# Patient Record
Sex: FEMALE | Race: WHITE | NOT HISPANIC OR LATINO
[De-identification: names, ages, dates, MRNs, and addresses within clinical notes are randomized per-mention and may not be internally consistent; named-entity substitution may affect disease eponyms.]

---

## 2020-11-30 ENCOUNTER — TRANSCRIPTION ENCOUNTER (OUTPATIENT)
Age: 50
End: 2020-11-30

## 2020-12-31 ENCOUNTER — TRANSCRIPTION ENCOUNTER (OUTPATIENT)
Age: 50
End: 2020-12-31

## 2021-08-30 ENCOUNTER — TRANSCRIPTION ENCOUNTER (OUTPATIENT)
Age: 51
End: 2021-08-30

## 2021-10-04 ENCOUNTER — TRANSCRIPTION ENCOUNTER (OUTPATIENT)
Age: 51
End: 2021-10-04

## 2021-11-30 ENCOUNTER — TRANSCRIPTION ENCOUNTER (OUTPATIENT)
Age: 51
End: 2021-11-30

## 2022-10-19 ENCOUNTER — TRANSCRIPTION ENCOUNTER (OUTPATIENT)
Age: 52
End: 2022-10-19

## 2022-10-19 ENCOUNTER — APPOINTMENT (OUTPATIENT)
Dept: SURGERY | Facility: CLINIC | Age: 52
End: 2022-10-19

## 2022-10-19 PROCEDURE — 99205K: CUSTOM

## 2022-10-20 ENCOUNTER — APPOINTMENT (OUTPATIENT)
Dept: MRI IMAGING | Facility: CLINIC | Age: 52
End: 2022-10-20

## 2022-10-20 ENCOUNTER — OUTPATIENT (OUTPATIENT)
Dept: OUTPATIENT SERVICES | Facility: HOSPITAL | Age: 52
LOS: 1 days | End: 2022-10-20
Payer: COMMERCIAL

## 2022-10-20 DIAGNOSIS — Z00.8 ENCOUNTER FOR OTHER GENERAL EXAMINATION: ICD-10-CM

## 2022-10-20 PROCEDURE — C8937: CPT

## 2022-10-20 PROCEDURE — A9585: CPT

## 2022-10-20 PROCEDURE — C8908: CPT

## 2022-10-20 PROCEDURE — 77049 MRI BREAST C-+ W/CAD BI: CPT | Mod: 26

## 2022-10-25 ENCOUNTER — OUTPATIENT (OUTPATIENT)
Dept: OUTPATIENT SERVICES | Facility: HOSPITAL | Age: 52
LOS: 1 days | Discharge: HOME | End: 2022-10-25

## 2022-10-25 DIAGNOSIS — C50.919 MALIGNANT NEOPLASM OF UNSPECIFIED SITE OF UNSPECIFIED FEMALE BREAST: ICD-10-CM

## 2022-10-26 ENCOUNTER — APPOINTMENT (OUTPATIENT)
Dept: NUCLEAR MEDICINE | Facility: CLINIC | Age: 52
End: 2022-10-26

## 2022-10-26 ENCOUNTER — APPOINTMENT (OUTPATIENT)
Dept: CT IMAGING | Facility: CLINIC | Age: 52
End: 2022-10-26

## 2022-10-26 ENCOUNTER — OUTPATIENT (OUTPATIENT)
Dept: OUTPATIENT SERVICES | Facility: HOSPITAL | Age: 52
LOS: 1 days | End: 2022-10-26

## 2022-10-26 DIAGNOSIS — Z00.8 ENCOUNTER FOR OTHER GENERAL EXAMINATION: ICD-10-CM

## 2022-10-26 PROCEDURE — 78306 BONE IMAGING WHOLE BODY: CPT | Mod: 26

## 2022-10-26 PROCEDURE — 71260 CT THORAX DX C+: CPT | Mod: 26

## 2022-10-26 PROCEDURE — 74177 CT ABD & PELVIS W/CONTRAST: CPT | Mod: 26

## 2022-11-01 ENCOUNTER — RESULT REVIEW (OUTPATIENT)
Age: 52
End: 2022-11-01

## 2022-11-01 ENCOUNTER — APPOINTMENT (OUTPATIENT)
Dept: HEMATOLOGY ONCOLOGY | Facility: CLINIC | Age: 52
End: 2022-11-01

## 2022-11-01 ENCOUNTER — NON-APPOINTMENT (OUTPATIENT)
Age: 52
End: 2022-11-01

## 2022-11-01 VITALS
TEMPERATURE: 97 F | RESPIRATION RATE: 16 BRPM | DIASTOLIC BLOOD PRESSURE: 97 MMHG | WEIGHT: 183.65 LBS | BODY MASS INDEX: 26.59 KG/M2 | HEART RATE: 94 BPM | OXYGEN SATURATION: 98 % | HEIGHT: 69.65 IN | SYSTOLIC BLOOD PRESSURE: 154 MMHG

## 2022-11-01 LAB
BASOPHILS # BLD AUTO: 0.05 K/UL — SIGNIFICANT CHANGE UP (ref 0–0.2)
BASOPHILS NFR BLD AUTO: 0.6 % — SIGNIFICANT CHANGE UP (ref 0–2)
EOSINOPHIL # BLD AUTO: 0.03 K/UL — SIGNIFICANT CHANGE UP (ref 0–0.5)
EOSINOPHIL NFR BLD AUTO: 0.4 % — SIGNIFICANT CHANGE UP (ref 0–6)
HCT VFR BLD CALC: 40.1 % — SIGNIFICANT CHANGE UP (ref 34.5–45)
HGB BLD-MCNC: 13.7 G/DL — SIGNIFICANT CHANGE UP (ref 11.5–15.5)
IMM GRANULOCYTES NFR BLD AUTO: 0.3 % — SIGNIFICANT CHANGE UP (ref 0–0.9)
LYMPHOCYTES # BLD AUTO: 1.55 K/UL — SIGNIFICANT CHANGE UP (ref 1–3.3)
LYMPHOCYTES # BLD AUTO: 19.6 % — SIGNIFICANT CHANGE UP (ref 13–44)
MCHC RBC-ENTMCNC: 30.2 PG — SIGNIFICANT CHANGE UP (ref 27–34)
MCHC RBC-ENTMCNC: 34.2 G/DL — SIGNIFICANT CHANGE UP (ref 32–36)
MCV RBC AUTO: 88.5 FL — SIGNIFICANT CHANGE UP (ref 80–100)
MONOCYTES # BLD AUTO: 0.66 K/UL — SIGNIFICANT CHANGE UP (ref 0–0.9)
MONOCYTES NFR BLD AUTO: 8.4 % — SIGNIFICANT CHANGE UP (ref 2–14)
NEUTROPHILS # BLD AUTO: 5.59 K/UL — SIGNIFICANT CHANGE UP (ref 1.8–7.4)
NEUTROPHILS NFR BLD AUTO: 70.7 % — SIGNIFICANT CHANGE UP (ref 43–77)
NRBC # BLD: 0 /100 WBCS — SIGNIFICANT CHANGE UP (ref 0–0)
PLATELET # BLD AUTO: 280 K/UL — SIGNIFICANT CHANGE UP (ref 150–400)
RBC # BLD: 4.53 M/UL — SIGNIFICANT CHANGE UP (ref 3.8–5.2)
RBC # FLD: 12.5 % — SIGNIFICANT CHANGE UP (ref 10.3–14.5)
WBC # BLD: 7.9 K/UL — SIGNIFICANT CHANGE UP (ref 3.8–10.5)
WBC # FLD AUTO: 7.9 K/UL — SIGNIFICANT CHANGE UP (ref 3.8–10.5)

## 2022-11-01 PROCEDURE — 99245 OFF/OP CONSLTJ NEW/EST HI 55: CPT

## 2022-11-02 LAB
ALBUMIN SERPL ELPH-MCNC: 5 G/DL
ALP BLD-CCNC: 86 U/L
ALT SERPL-CCNC: 15 U/L
ANION GAP SERPL CALC-SCNC: 15 MMOL/L
AST SERPL-CCNC: 18 U/L
BILIRUB SERPL-MCNC: 0.3 MG/DL
BUN SERPL-MCNC: 14 MG/DL
CALCIUM SERPL-MCNC: 9.9 MG/DL
CHLORIDE SERPL-SCNC: 102 MMOL/L
CO2 SERPL-SCNC: 26 MMOL/L
CREAT SERPL-MCNC: 0.73 MG/DL
EGFR: 98 ML/MIN/1.73M2
GLUCOSE SERPL-MCNC: 107 MG/DL
HBV SURFACE AB SER QL: NONREACTIVE
HBV SURFACE AG SER QL: NONREACTIVE
HCV AB SER QL: NONREACTIVE
HCV S/CO RATIO: 0.16 S/CO
INR PPP: 1.03 RATIO
POTASSIUM SERPL-SCNC: 4.2 MMOL/L
PROT SERPL-MCNC: 7.8 G/DL
PT BLD: 12.2 SEC
SODIUM SERPL-SCNC: 142 MMOL/L

## 2022-11-02 NOTE — HISTORY OF PRESENT ILLNESS
[Disease: _____________________] : Disease: [unfilled] [T: ___] : T[unfilled] [N: ___] : N[unfilled] [M: ___] : M[unfilled] [de-identified] : Please see dictated initial consult note (*d consult) in AEHR [de-identified] : ER+ GA+ Her 2 michelle +

## 2022-11-03 ENCOUNTER — APPOINTMENT (OUTPATIENT)
Dept: ULTRASOUND IMAGING | Facility: IMAGING CENTER | Age: 52
End: 2022-11-03

## 2022-11-03 ENCOUNTER — OUTPATIENT (OUTPATIENT)
Dept: OUTPATIENT SERVICES | Facility: HOSPITAL | Age: 52
LOS: 1 days | End: 2022-11-03
Payer: COMMERCIAL

## 2022-11-03 DIAGNOSIS — Z00.8 ENCOUNTER FOR OTHER GENERAL EXAMINATION: ICD-10-CM

## 2022-11-03 PROCEDURE — 19083 BX BREAST 1ST LESION US IMAG: CPT

## 2022-11-03 PROCEDURE — 76642 ULTRASOUND BREAST LIMITED: CPT | Mod: 26,LT

## 2022-11-03 PROCEDURE — 76642 ULTRASOUND BREAST LIMITED: CPT

## 2022-11-04 ENCOUNTER — RESULT REVIEW (OUTPATIENT)
Age: 52
End: 2022-11-04

## 2022-11-08 ENCOUNTER — FORM ENCOUNTER (OUTPATIENT)
Age: 52
End: 2022-11-08

## 2022-11-11 ENCOUNTER — RESULT REVIEW (OUTPATIENT)
Age: 52
End: 2022-11-11

## 2022-11-11 ENCOUNTER — TRANSCRIPTION ENCOUNTER (OUTPATIENT)
Age: 52
End: 2022-11-11

## 2022-11-11 ENCOUNTER — OUTPATIENT (OUTPATIENT)
Dept: OUTPATIENT SERVICES | Facility: HOSPITAL | Age: 52
LOS: 1 days | End: 2022-11-11
Payer: COMMERCIAL

## 2022-11-11 VITALS
HEART RATE: 81 BPM | SYSTOLIC BLOOD PRESSURE: 108 MMHG | DIASTOLIC BLOOD PRESSURE: 70 MMHG | RESPIRATION RATE: 16 BRPM | OXYGEN SATURATION: 99 %

## 2022-11-11 VITALS
WEIGHT: 179.9 LBS | HEART RATE: 77 BPM | HEIGHT: 70 IN | OXYGEN SATURATION: 96 % | SYSTOLIC BLOOD PRESSURE: 159 MMHG | RESPIRATION RATE: 18 BRPM | DIASTOLIC BLOOD PRESSURE: 106 MMHG | TEMPERATURE: 98 F

## 2022-11-11 DIAGNOSIS — C50.912 MALIGNANT NEOPLASM OF UNSPECIFIED SITE OF LEFT FEMALE BREAST: ICD-10-CM

## 2022-11-11 PROCEDURE — C1769: CPT

## 2022-11-11 PROCEDURE — 76937 US GUIDE VASCULAR ACCESS: CPT

## 2022-11-11 PROCEDURE — C1894: CPT

## 2022-11-11 PROCEDURE — 36561 INSERT TUNNELED CV CATH: CPT

## 2022-11-11 PROCEDURE — 77001 FLUOROGUIDE FOR VEIN DEVICE: CPT

## 2022-11-11 PROCEDURE — C1788: CPT

## 2022-11-11 PROCEDURE — 76937 US GUIDE VASCULAR ACCESS: CPT | Mod: 26

## 2022-11-11 PROCEDURE — 77001 FLUOROGUIDE FOR VEIN DEVICE: CPT | Mod: 26

## 2022-11-11 RX ORDER — ONDANSETRON 8 MG/1
4 TABLET, FILM COATED ORAL ONCE
Refills: 0 | Status: DISCONTINUED | OUTPATIENT
Start: 2022-11-11 | End: 2022-11-25

## 2022-11-11 RX ORDER — FENTANYL CITRATE 50 UG/ML
50 INJECTION INTRAVENOUS ONCE
Refills: 0 | Status: DISCONTINUED | OUTPATIENT
Start: 2022-11-11 | End: 2022-11-11

## 2022-11-11 NOTE — PRE PROCEDURE NOTE - PRE PROCEDURE EVALUATION
Interventional Radiology    HPI: 52y Female with newly diagnosed breast cancer, here for a port placement.    Allergies: Augmentin (Hives)  clindamycin (Hives)  doxycycline (Hives)  Keflex (Hives)  Sulfac 10% (Hives)    Medications (Abx/Cardiac/Anticoagulation/Blood Products)      Data:    T(C): 36.6  HR: 77  BP: 159/106  RR: 18  SpO2: 96%    Exam  General: No acute distress  Chest: Non labored breathing  Abdomen: Non-distended  Extremities: No swelling, warm    Plan: 52y Female with newly diagnosed breast cancer, here for a port placement.  -Risks/Benefits/alternatives explained with the patient and/or healthcare proxy and witnessed informed consent obtained.

## 2022-11-11 NOTE — ASU DISCHARGE PLAN (ADULT/PEDIATRIC) - NURSING INSTRUCTIONS
Please feel free to contact us at [phone] if any problems arise. After 6PM, Monday through Friday, on weekends and on holidays, please call [phone] and ask for the radiology resident on call to be paged.

## 2022-11-11 NOTE — ASU DISCHARGE PLAN (ADULT/PEDIATRIC) - NS MD DC FALL RISK RISK
For information on Fall & Injury Prevention, visit: [email or web address] OR  [email or web address] OR  https://www.cdc.gov/steadi/patient.html

## 2022-11-11 NOTE — PRE-ANESTHESIA EVALUATION ADULT - NSANTHOSAYNRD_GEN_A_CORE
No. PRIMO screening performed.  STOP BANG Legend: 0-2 = LOW Risk; 3-4 = INTERMEDIATE Risk; 5-8 = HIGH Risk
No. PRIMO screening performed.  STOP BANG Legend: 0-2 = LOW Risk; 3-4 = INTERMEDIATE Risk; 5-8 = HIGH Risk

## 2022-11-11 NOTE — ASU PATIENT PROFILE, ADULT - FALL HARM RISK - UNIVERSAL INTERVENTIONS
Bed in lowest position, wheels locked, appropriate side rails in place/Call bell, personal items and telephone in reach/Instruct patient to call for assistance before getting out of bed or chair/Non-slip footwear when patient is out of bed/[place] to call system/Physically safe environment - no spills, clutter or unnecessary equipment/Purposeful Proactive Rounding/Room/bathroom lighting operational, light cord in reach

## 2022-11-11 NOTE — ASU DISCHARGE PLAN (ADULT/PEDIATRIC) - ASU DC SPECIAL INSTRUCTIONSFT
Chest Port Placement    Discharge Instructions  - You have had a chest port implanted in your chest.   - The port is ready for use.  - You may shower in 48 hours. No soaking or swimming for 2 weeks or until the site is completely healed.  - Keep the area covered and dry for the next 7 days. It may be removed by a chemotherapy nurse as needed for treatment.  - Do not perform any heavy lifting or put tension on the area for the next week or until the site is healed.  - You may resume your normal diet.  - You may resume your normal medications however you should wait 48 hours before restarting aspirin, plavix, or blood thinners.  - It is normal to experience some pain over the site for the next few days. You may take apply ice to the area (20 minutes on, 20 minutes off) and take Tylenol for that pain. Do not take more frequently than every 6 hours and do not exceed more than 3000mg of Tylenol in a 24 hour period.    - You were given conscious sedation which may make you drowsy, therefore you need someone to stay with you until the morning following the procedure.  - Do not drive, engage in heavy lifting or strenuous activity, or drink any alcoholic beverages for the next 24 hours.   - You may resume normal activity in 24 hours.    Notify your primary physician and/or Interventional Radiology IMMEDIATELY if you experience any of the following       - Fever of 101F or 38C       - Chills or Rigors/ Shakes       - Swelling and/or Redness in the area around the port       - Worsening Pain       - Blood soaked bandages or worsening bleeding       - Lightheadedness and/or dizziness upon standing       - Chest Pain/ Tightness       - Shortness of Breath       - Difficulty walking    If you have a problem that you believe requires IMMEDIATE attention, please go to your NEAREST Emergency Room. If you believe your problem can safely wait until you speak to a physician, please call Interventional Radiology for any concerns.    During Normal Weekday Business Hours- You can contact the Interventional Radiology department during normal business hours via telephone.  During Evenings and Weekends- If you need to contact Interventional Radiology during off hours, do so by calling the hospital and requesting to be connected to the Interventional Radiologist on call.

## 2022-11-15 ENCOUNTER — APPOINTMENT (OUTPATIENT)
Dept: HEMATOLOGY ONCOLOGY | Facility: CLINIC | Age: 52
End: 2022-11-15

## 2022-11-15 VITALS
WEIGHT: 184.75 LBS | RESPIRATION RATE: 16 BRPM | BODY MASS INDEX: 26.75 KG/M2 | SYSTOLIC BLOOD PRESSURE: 147 MMHG | HEIGHT: 69.65 IN | DIASTOLIC BLOOD PRESSURE: 93 MMHG | HEART RATE: 88 BPM | TEMPERATURE: 98 F | OXYGEN SATURATION: 98 %

## 2022-11-15 PROCEDURE — 99215 OFFICE O/P EST HI 40 MIN: CPT

## 2022-11-15 RX ORDER — RABEPRAZOLE SODIUM 20 MG/1
20 TABLET, DELAYED RELEASE ORAL
Qty: 30 | Refills: 0 | Status: COMPLETED | COMMUNITY
Start: 2022-10-16

## 2022-11-15 RX ORDER — METOPROLOL TARTRATE 25 MG/1
25 TABLET, FILM COATED ORAL
Qty: 180 | Refills: 0 | Status: COMPLETED | COMMUNITY
Start: 2022-08-12

## 2022-11-15 RX ORDER — AZELAIC ACID 0.15 G/G
15 GEL TOPICAL
Qty: 50 | Refills: 0 | Status: COMPLETED | COMMUNITY
Start: 2022-07-06

## 2022-11-15 RX ORDER — CIPROFLOXACIN HYDROCHLORIDE 500 MG/1
500 TABLET, FILM COATED ORAL
Qty: 10 | Refills: 0 | Status: COMPLETED | COMMUNITY
Start: 2022-08-08

## 2022-11-16 ENCOUNTER — NON-APPOINTMENT (OUTPATIENT)
Age: 52
End: 2022-11-16

## 2022-11-16 NOTE — PHYSICAL EXAM
[Normal] : affect appropriate [de-identified] : No skin or nipple changes either breast.  Right breast with well healed axillary scar, no discrete palpable masses, no palpable axillary nodes.  Left breast with palpable mass ~2 cm at 5:00, firm, mobile, NT, another palpable density at ~ 1:00, unclear margins, NT, no other palpable masses noted.  Well healed surgical scar, accessory breast tissue. No palpable axillary nodes.

## 2022-11-17 DIAGNOSIS — Z45.2 ENCOUNTER FOR ADJUSTMENT AND MANAGEMENT OF VASCULAR ACCESS DEVICE: ICD-10-CM

## 2022-11-22 ENCOUNTER — RESULT REVIEW (OUTPATIENT)
Age: 52
End: 2022-11-22

## 2022-11-22 ENCOUNTER — APPOINTMENT (OUTPATIENT)
Dept: INFUSION THERAPY | Facility: HOSPITAL | Age: 52
End: 2022-11-22

## 2022-11-22 ENCOUNTER — NON-APPOINTMENT (OUTPATIENT)
Age: 52
End: 2022-11-22

## 2022-11-22 LAB
ALBUMIN SERPL ELPH-MCNC: 4.7 G/DL — SIGNIFICANT CHANGE UP (ref 3.3–5)
ALP SERPL-CCNC: 81 U/L — SIGNIFICANT CHANGE UP (ref 40–120)
ALT FLD-CCNC: 10 U/L — SIGNIFICANT CHANGE UP (ref 10–45)
ANION GAP SERPL CALC-SCNC: 15 MMOL/L — SIGNIFICANT CHANGE UP (ref 5–17)
AST SERPL-CCNC: 15 U/L — SIGNIFICANT CHANGE UP (ref 10–40)
BASOPHILS # BLD AUTO: 0.02 K/UL — SIGNIFICANT CHANGE UP (ref 0–0.2)
BASOPHILS NFR BLD AUTO: 0.2 % — SIGNIFICANT CHANGE UP (ref 0–2)
BILIRUB SERPL-MCNC: 0.2 MG/DL — SIGNIFICANT CHANGE UP (ref 0.2–1.2)
BUN SERPL-MCNC: 15 MG/DL — SIGNIFICANT CHANGE UP (ref 7–23)
CALCIUM SERPL-MCNC: 9.9 MG/DL — SIGNIFICANT CHANGE UP (ref 8.4–10.5)
CHLORIDE SERPL-SCNC: 102 MMOL/L — SIGNIFICANT CHANGE UP (ref 96–108)
CO2 SERPL-SCNC: 20 MMOL/L — LOW (ref 22–31)
CREAT SERPL-MCNC: 0.62 MG/DL — SIGNIFICANT CHANGE UP (ref 0.5–1.3)
EGFR: 106 ML/MIN/1.73M2 — SIGNIFICANT CHANGE UP
EOSINOPHIL # BLD AUTO: 0 K/UL — SIGNIFICANT CHANGE UP (ref 0–0.5)
EOSINOPHIL NFR BLD AUTO: 0 % — SIGNIFICANT CHANGE UP (ref 0–6)
GLUCOSE SERPL-MCNC: 135 MG/DL — HIGH (ref 70–99)
HCT VFR BLD CALC: 40.5 % — SIGNIFICANT CHANGE UP (ref 34.5–45)
HGB BLD-MCNC: 14.3 G/DL — SIGNIFICANT CHANGE UP (ref 11.5–15.5)
IMM GRANULOCYTES NFR BLD AUTO: 0.3 % — SIGNIFICANT CHANGE UP (ref 0–0.9)
LYMPHOCYTES # BLD AUTO: 0.83 K/UL — LOW (ref 1–3.3)
LYMPHOCYTES # BLD AUTO: 6.7 % — LOW (ref 13–44)
MCHC RBC-ENTMCNC: 30.3 PG — SIGNIFICANT CHANGE UP (ref 27–34)
MCHC RBC-ENTMCNC: 35.3 G/DL — SIGNIFICANT CHANGE UP (ref 32–36)
MCV RBC AUTO: 85.8 FL — SIGNIFICANT CHANGE UP (ref 80–100)
MONOCYTES # BLD AUTO: 0.17 K/UL — SIGNIFICANT CHANGE UP (ref 0–0.9)
MONOCYTES NFR BLD AUTO: 1.4 % — LOW (ref 2–14)
NEUTROPHILS # BLD AUTO: 11.38 K/UL — HIGH (ref 1.8–7.4)
NEUTROPHILS NFR BLD AUTO: 91.4 % — HIGH (ref 43–77)
NRBC # BLD: 0 /100 WBCS — SIGNIFICANT CHANGE UP (ref 0–0)
PLATELET # BLD AUTO: 270 K/UL — SIGNIFICANT CHANGE UP (ref 150–400)
POTASSIUM SERPL-MCNC: 4.2 MMOL/L — SIGNIFICANT CHANGE UP (ref 3.5–5.3)
POTASSIUM SERPL-SCNC: 4.2 MMOL/L — SIGNIFICANT CHANGE UP (ref 3.5–5.3)
PROT SERPL-MCNC: 7.5 G/DL — SIGNIFICANT CHANGE UP (ref 6–8.3)
RBC # BLD: 4.72 M/UL — SIGNIFICANT CHANGE UP (ref 3.8–5.2)
RBC # FLD: 11.9 % — SIGNIFICANT CHANGE UP (ref 10.3–14.5)
SODIUM SERPL-SCNC: 138 MMOL/L — SIGNIFICANT CHANGE UP (ref 135–145)
WBC # BLD: 12.44 K/UL — HIGH (ref 3.8–10.5)
WBC # FLD AUTO: 12.44 K/UL — HIGH (ref 3.8–10.5)

## 2022-11-22 RX ORDER — UBIDECARENONE 100 MG
1 CAPSULE ORAL
Qty: 0 | Refills: 0 | DISCHARGE

## 2022-11-23 DIAGNOSIS — R11.2 NAUSEA WITH VOMITING, UNSPECIFIED: ICD-10-CM

## 2022-11-23 DIAGNOSIS — Z51.11 ENCOUNTER FOR ANTINEOPLASTIC CHEMOTHERAPY: ICD-10-CM

## 2022-11-23 DIAGNOSIS — Z51.89 ENCOUNTER FOR OTHER SPECIFIED AFTERCARE: ICD-10-CM

## 2022-11-29 ENCOUNTER — FORM ENCOUNTER (OUTPATIENT)
Age: 52
End: 2022-11-29

## 2022-12-10 ENCOUNTER — FORM ENCOUNTER (OUTPATIENT)
Age: 52
End: 2022-12-10

## 2022-12-14 ENCOUNTER — NON-APPOINTMENT (OUTPATIENT)
Age: 52
End: 2022-12-14

## 2022-12-15 ENCOUNTER — OUTPATIENT (OUTPATIENT)
Dept: OUTPATIENT SERVICES | Facility: HOSPITAL | Age: 52
LOS: 1 days | Discharge: HOME | End: 2022-12-15

## 2022-12-15 DIAGNOSIS — C50.919 MALIGNANT NEOPLASM OF UNSPECIFIED SITE OF UNSPECIFIED FEMALE BREAST: ICD-10-CM

## 2022-12-15 PROBLEM — K21.9 GASTRO-ESOPHAGEAL REFLUX DISEASE WITHOUT ESOPHAGITIS: Chronic | Status: ACTIVE | Noted: 2022-11-17

## 2022-12-15 PROBLEM — E01.0 IODINE-DEFICIENCY RELATED DIFFUSE (ENDEMIC) GOITER: Chronic | Status: ACTIVE | Noted: 2022-11-17

## 2022-12-15 PROBLEM — I10 ESSENTIAL (PRIMARY) HYPERTENSION: Chronic | Status: ACTIVE | Noted: 2022-11-17

## 2022-12-15 PROBLEM — M48.00 SPINAL STENOSIS, SITE UNSPECIFIED: Chronic | Status: ACTIVE | Noted: 2022-11-17

## 2022-12-16 ENCOUNTER — APPOINTMENT (OUTPATIENT)
Dept: HEMATOLOGY ONCOLOGY | Facility: CLINIC | Age: 52
End: 2022-12-16

## 2022-12-16 ENCOUNTER — RESULT REVIEW (OUTPATIENT)
Age: 52
End: 2022-12-16

## 2022-12-16 ENCOUNTER — NON-APPOINTMENT (OUTPATIENT)
Age: 52
End: 2022-12-16

## 2022-12-16 ENCOUNTER — APPOINTMENT (OUTPATIENT)
Dept: INFUSION THERAPY | Facility: HOSPITAL | Age: 52
End: 2022-12-16

## 2022-12-16 VITALS
RESPIRATION RATE: 16 BRPM | BODY MASS INDEX: 42.42 KG/M2 | WEIGHT: 293 LBS | HEIGHT: 69.61 IN | DIASTOLIC BLOOD PRESSURE: 100 MMHG | OXYGEN SATURATION: 98 % | SYSTOLIC BLOOD PRESSURE: 169 MMHG | TEMPERATURE: 98 F | HEART RATE: 82 BPM

## 2022-12-16 LAB
ALBUMIN SERPL ELPH-MCNC: 4.6 G/DL — SIGNIFICANT CHANGE UP (ref 3.3–5)
ALP SERPL-CCNC: 80 U/L — SIGNIFICANT CHANGE UP (ref 40–120)
ALT FLD-CCNC: 28 U/L — SIGNIFICANT CHANGE UP (ref 10–45)
ANION GAP SERPL CALC-SCNC: 16 MMOL/L — SIGNIFICANT CHANGE UP (ref 5–17)
AST SERPL-CCNC: 27 U/L — SIGNIFICANT CHANGE UP (ref 10–40)
BASOPHILS # BLD AUTO: 0.01 K/UL — SIGNIFICANT CHANGE UP (ref 0–0.2)
BASOPHILS NFR BLD AUTO: 0.1 % — SIGNIFICANT CHANGE UP (ref 0–2)
BILIRUB SERPL-MCNC: 0.3 MG/DL — SIGNIFICANT CHANGE UP (ref 0.2–1.2)
BUN SERPL-MCNC: 14 MG/DL — SIGNIFICANT CHANGE UP (ref 7–23)
CALCIUM SERPL-MCNC: 9.2 MG/DL — SIGNIFICANT CHANGE UP (ref 8.4–10.5)
CHLORIDE SERPL-SCNC: 99 MMOL/L — SIGNIFICANT CHANGE UP (ref 96–108)
CO2 SERPL-SCNC: 25 MMOL/L — SIGNIFICANT CHANGE UP (ref 22–31)
CREAT SERPL-MCNC: 1.1 MG/DL — SIGNIFICANT CHANGE UP (ref 0.5–1.3)
EGFR: 60 ML/MIN/1.73M2 — SIGNIFICANT CHANGE UP
EOSINOPHIL # BLD AUTO: 0 K/UL — SIGNIFICANT CHANGE UP (ref 0–0.5)
EOSINOPHIL NFR BLD AUTO: 0 % — SIGNIFICANT CHANGE UP (ref 0–6)
GLUCOSE SERPL-MCNC: 131 MG/DL — HIGH (ref 70–99)
HCT VFR BLD CALC: 31.3 % — LOW (ref 34.5–45)
HGB BLD-MCNC: 11.2 G/DL — LOW (ref 11.5–15.5)
IMM GRANULOCYTES NFR BLD AUTO: 0.5 % — SIGNIFICANT CHANGE UP (ref 0–0.9)
LYMPHOCYTES # BLD AUTO: 0.58 K/UL — LOW (ref 1–3.3)
LYMPHOCYTES # BLD AUTO: 7.2 % — LOW (ref 13–44)
MCHC RBC-ENTMCNC: 29.9 PG — SIGNIFICANT CHANGE UP (ref 27–34)
MCHC RBC-ENTMCNC: 35.1 G/DL — SIGNIFICANT CHANGE UP (ref 32–36)
MCV RBC AUTO: 85.1 FL — SIGNIFICANT CHANGE UP (ref 80–100)
MONOCYTES # BLD AUTO: 0.16 K/UL — SIGNIFICANT CHANGE UP (ref 0–0.9)
MONOCYTES NFR BLD AUTO: 2 % — SIGNIFICANT CHANGE UP (ref 2–14)
NEUTROPHILS # BLD AUTO: 7.3 K/UL — SIGNIFICANT CHANGE UP (ref 1.8–7.4)
NEUTROPHILS NFR BLD AUTO: 90.2 % — HIGH (ref 43–77)
NRBC # BLD: 0 /100 WBCS — SIGNIFICANT CHANGE UP (ref 0–0)
PLATELET # BLD AUTO: 288 K/UL — SIGNIFICANT CHANGE UP (ref 150–400)
POTASSIUM SERPL-MCNC: 3.6 MMOL/L — SIGNIFICANT CHANGE UP (ref 3.5–5.3)
POTASSIUM SERPL-SCNC: 3.6 MMOL/L — SIGNIFICANT CHANGE UP (ref 3.5–5.3)
PROT SERPL-MCNC: 7.1 G/DL — SIGNIFICANT CHANGE UP (ref 6–8.3)
RBC # BLD: 3.68 M/UL — LOW (ref 3.8–5.2)
RBC # FLD: 13.1 % — SIGNIFICANT CHANGE UP (ref 10.3–14.5)
SODIUM SERPL-SCNC: 140 MMOL/L — SIGNIFICANT CHANGE UP (ref 135–145)
WBC # BLD: 8.09 K/UL — SIGNIFICANT CHANGE UP (ref 3.8–10.5)
WBC # FLD AUTO: 8.09 K/UL — SIGNIFICANT CHANGE UP (ref 3.8–10.5)

## 2022-12-16 PROCEDURE — 99214 OFFICE O/P EST MOD 30 MIN: CPT

## 2022-12-16 NOTE — HISTORY OF PRESENT ILLNESS
[de-identified] : The patient reports having had cosmetic surgery to remove "fat from my axilla," with the patient noting that this was "extra breast tissue."  She had a first procedure in the right axilla approximately 15 years ago with pathology returning negative per patient reporting, and more recently had a similar procedure in her left axilla, "left armpit fat," with the patient also noting that the pathology returned negative.  I do not have a copy of either of these pathology reports.\par \par The patient reports she was not happy with the result, noting that there seemed to be residual tissue in the left axilla.  She kept palpating this area.  She ultimately felt a lump in the left breast, but not near the armpit.  She returned to her plastic surgeon, who palpated the same and noted this was "definitely something else."  Consequently, she was referred for a diagnostic mammogram on 10/10/2022 (with her last prior mammogram approximately 1-2 years earlier).  The right mammogram returned unremarkable; in the upper left breast, middle depth, there was an area of subtle architectural distortion; there was also stable benign-appearing mass in the central left breast at the middle to posterior depth; there were bilateral benign-appearing calcifications.  An ultrasound performed on that same date in the left breast demonstrated left breast 1 o'clock axis 8 centimeters from nipple, likely corresponding to the subtle distortion seen mammographically, there was an 8 x 8 x 8 millimeter irregular hypoechoic mass, which appeared to be associated with some subtle sonographic distortion with ultrasound-guided core biopsy recommended; in the left breast 5 o'clock axis 3 centimeters from nipple, where the patient felt a lump, there was a vague 15 x 6 x 10 millimeter irregular hypoechoic mass; there was no suspicious sonographic findings in the left axilla.  The patient ultimately went on to have an ultrasound-guided core biopsy on 10/14/2022 of the left breast 1 o'clock lesion with a finding of invasive ductal carcinoma, moderately differentiated, estrogen receptor positive, progesterone receptor positive and HER-2/michelle negative; ultrasound-guided biopsy of left breast 5 o'clock lesion showing invasive ductal carcinoma, moderately differentiated, progesterone receptor positive, HER-2/michelle positive.  There was a comment that the 2 lesions were 5.4 centimeters apart, and a pretreatment breast MRI was recommended.  The patient then went on to see Dr. Tierra Samayoa and a bilateral breast MRI was performed on 10/20/2022 with findings of a mass and non-mass enhancement surrounding the biopsy marker in the upper outer breast middle to posterior depth spanning up to 3.5 centimeters x 2.5 centimeters corresponding to the site of newly diagnosed malignancy; there was a mass and non-mass enhancement in the lower outer left breast 5 o'clock position anterior to middle depth, spanning 3.7 x 2.0 centimeters surrounding a biopsy marker; the enhancement was fairly superficial in location, part of which was located in subcutaneous fat without overlying skin enhancement; there was a 6 millimeter focus of irregular, superficial, non-mass enhancement in the lower outer anterior breast located approximately 1.3-1.4 centimeters from nipple within contiguous non-mass enhancement posterior to the index carcinoma at the 5 o'clock position; no axillary right adenopathy was noted; no significant internal mammary adenopathy was noted; there were 2 adjacent prominent level 1 lymph nodes with possible thickened cortex measuring up to 1.5 centimeters in length in the left axilla with no overlying skin thickening or edema in the axilla.  The patient reports being scheduled for a biopsy of the left axillary lymph nodes within the next several days.\par \par The patient is seen today in consultation regarding further treatment recommendations.  She notes a good appetite, stable weight, and excellent performance status.\par \par A review of 10 systems is remarkable for anxiety since her breast cancer diagnosis; she has a history of chronic aches in her feet, hands, and right hip for which an extensive prior workups were negative; she has seen rheumatologist for multiple tests, which all returned negative per patient's report.\par  [de-identified] : The patient presents today for follow up/pretreatment.\par \par She is s/p C1 of TCHP, tolerating fairly well.\par \par She states she is having occasional diarrhea.  No more than 1-2 BM/day.  She states when occurs she takes imodium with relief.   She states she also experienced bony pain first 3-4 after Onpro released.  She c/o fatigue first week as well.  She states day 8/9 symptoms resolved and she felt better.  \par \par She states last three days she has been having a headache.  She thinks because of stress, which causes her BP to rise.  She saw her cardiologist and she states EKG awas done and was wnl.  She states her BP was okay in his office.  She will monitor BP at home.  She states headache has resolved with advil.\par \par She denies any current complaints.  Notes a good appetite, stable weight and excellent performance status.  She has stopped working for duration of treatment. \par

## 2022-12-16 NOTE — PHYSICAL EXAM
[Normal] : normoactive bowel sounds, soft and nontender, no hepatosplenomegaly or masses appreciated [de-identified] : Thyromegaly, L>R [de-identified] : No skin or nipple changes either breast.  Right breast with well healed axillary scar, no discrete palpable masses, no palpable axillary nodes.  Left breast with density at 5:00, smaller another palpable density at 1:00 appears smaller as well,  no other palpable masses noted.  Well healed surgical scar, accessory breast tissue. No palpable axillary nodes.

## 2022-12-19 DIAGNOSIS — R11.2 NAUSEA WITH VOMITING, UNSPECIFIED: ICD-10-CM

## 2022-12-19 DIAGNOSIS — Z51.89 ENCOUNTER FOR OTHER SPECIFIED AFTERCARE: ICD-10-CM

## 2022-12-19 DIAGNOSIS — Z51.11 ENCOUNTER FOR ANTINEOPLASTIC CHEMOTHERAPY: ICD-10-CM

## 2022-12-28 ENCOUNTER — RX RENEWAL (OUTPATIENT)
Age: 52
End: 2022-12-28

## 2022-12-29 ENCOUNTER — NON-APPOINTMENT (OUTPATIENT)
Age: 52
End: 2022-12-29

## 2022-12-30 ENCOUNTER — RESULT REVIEW (OUTPATIENT)
Age: 52
End: 2022-12-30

## 2022-12-30 ENCOUNTER — NON-APPOINTMENT (OUTPATIENT)
Age: 52
End: 2022-12-30

## 2022-12-30 ENCOUNTER — APPOINTMENT (OUTPATIENT)
Dept: HEMATOLOGY ONCOLOGY | Facility: CLINIC | Age: 52
End: 2022-12-30

## 2022-12-30 LAB
ANION GAP SERPL CALC-SCNC: 13 MMOL/L
BASOPHILS # BLD AUTO: 0.02 K/UL — SIGNIFICANT CHANGE UP (ref 0–0.2)
BASOPHILS NFR BLD AUTO: 0.4 % — SIGNIFICANT CHANGE UP (ref 0–2)
BUN SERPL-MCNC: 28 MG/DL
CALCIUM SERPL-MCNC: 8.8 MG/DL
CHLORIDE SERPL-SCNC: 97 MMOL/L
CO2 SERPL-SCNC: 30 MMOL/L
CREAT SERPL-MCNC: 1.56 MG/DL
EGFR: 40 ML/MIN/1.73M2
EOSINOPHIL # BLD AUTO: 0 K/UL — SIGNIFICANT CHANGE UP (ref 0–0.5)
EOSINOPHIL NFR BLD AUTO: 0 % — SIGNIFICANT CHANGE UP (ref 0–6)
GLUCOSE SERPL-MCNC: 135 MG/DL
HCT VFR BLD CALC: 30.4 % — LOW (ref 34.5–45)
HGB BLD-MCNC: 10.6 G/DL — LOW (ref 11.5–15.5)
IMM GRANULOCYTES NFR BLD AUTO: 0.2 % — SIGNIFICANT CHANGE UP (ref 0–0.9)
LYMPHOCYTES # BLD AUTO: 1.7 K/UL — SIGNIFICANT CHANGE UP (ref 1–3.3)
LYMPHOCYTES # BLD AUTO: 37.3 % — SIGNIFICANT CHANGE UP (ref 13–44)
MCHC RBC-ENTMCNC: 29.8 PG — SIGNIFICANT CHANGE UP (ref 27–34)
MCHC RBC-ENTMCNC: 34.9 G/DL — SIGNIFICANT CHANGE UP (ref 32–36)
MCV RBC AUTO: 85.4 FL — SIGNIFICANT CHANGE UP (ref 80–100)
MONOCYTES # BLD AUTO: 0.39 K/UL — SIGNIFICANT CHANGE UP (ref 0–0.9)
MONOCYTES NFR BLD AUTO: 8.6 % — SIGNIFICANT CHANGE UP (ref 2–14)
NEUTROPHILS # BLD AUTO: 2.44 K/UL — SIGNIFICANT CHANGE UP (ref 1.8–7.4)
NEUTROPHILS NFR BLD AUTO: 53.5 % — SIGNIFICANT CHANGE UP (ref 43–77)
NRBC # BLD: 0 /100 WBCS — SIGNIFICANT CHANGE UP (ref 0–0)
PLATELET # BLD AUTO: 32 K/UL — LOW (ref 150–400)
POTASSIUM SERPL-SCNC: 3.3 MMOL/L
RBC # BLD: 3.56 M/UL — LOW (ref 3.8–5.2)
RBC # FLD: 12.8 % — SIGNIFICANT CHANGE UP (ref 10.3–14.5)
SODIUM SERPL-SCNC: 139 MMOL/L
WBC # BLD: 4.56 K/UL — SIGNIFICANT CHANGE UP (ref 3.8–10.5)
WBC # FLD AUTO: 4.56 K/UL — SIGNIFICANT CHANGE UP (ref 3.8–10.5)

## 2022-12-31 ENCOUNTER — RESULT REVIEW (OUTPATIENT)
Age: 52
End: 2022-12-31

## 2022-12-31 ENCOUNTER — APPOINTMENT (OUTPATIENT)
Dept: INFUSION THERAPY | Facility: HOSPITAL | Age: 52
End: 2022-12-31

## 2022-12-31 LAB
ANION GAP SERPL CALC-SCNC: 16 MMOL/L — SIGNIFICANT CHANGE UP (ref 5–17)
BASOPHILS # BLD AUTO: 0.01 K/UL — SIGNIFICANT CHANGE UP (ref 0–0.2)
BASOPHILS NFR BLD AUTO: 0.2 % — SIGNIFICANT CHANGE UP (ref 0–2)
BUN SERPL-MCNC: 24 MG/DL — HIGH (ref 7–23)
CALCIUM SERPL-MCNC: 8.7 MG/DL — SIGNIFICANT CHANGE UP (ref 8.4–10.5)
CHLORIDE SERPL-SCNC: 99 MMOL/L — SIGNIFICANT CHANGE UP (ref 96–108)
CO2 SERPL-SCNC: 25 MMOL/L — SIGNIFICANT CHANGE UP (ref 22–31)
CREAT SERPL-MCNC: 1.5 MG/DL — HIGH (ref 0.5–1.3)
EGFR: 41 ML/MIN/1.73M2 — LOW
EOSINOPHIL # BLD AUTO: 0 K/UL — SIGNIFICANT CHANGE UP (ref 0–0.5)
EOSINOPHIL NFR BLD AUTO: 0 % — SIGNIFICANT CHANGE UP (ref 0–6)
GLUCOSE SERPL-MCNC: 96 MG/DL — SIGNIFICANT CHANGE UP (ref 70–99)
HCT VFR BLD CALC: 27.1 % — LOW (ref 34.5–45)
HGB BLD-MCNC: 9.6 G/DL — LOW (ref 11.5–15.5)
IMM GRANULOCYTES NFR BLD AUTO: 0.5 % — SIGNIFICANT CHANGE UP (ref 0–0.9)
LYMPHOCYTES # BLD AUTO: 1.81 K/UL — SIGNIFICANT CHANGE UP (ref 1–3.3)
LYMPHOCYTES # BLD AUTO: 43 % — SIGNIFICANT CHANGE UP (ref 13–44)
MCHC RBC-ENTMCNC: 30 PG — SIGNIFICANT CHANGE UP (ref 27–34)
MCHC RBC-ENTMCNC: 35.4 G/DL — SIGNIFICANT CHANGE UP (ref 32–36)
MCV RBC AUTO: 84.7 FL — SIGNIFICANT CHANGE UP (ref 80–100)
MONOCYTES # BLD AUTO: 0.52 K/UL — SIGNIFICANT CHANGE UP (ref 0–0.9)
MONOCYTES NFR BLD AUTO: 12.4 % — SIGNIFICANT CHANGE UP (ref 2–14)
NEUTROPHILS # BLD AUTO: 1.85 K/UL — SIGNIFICANT CHANGE UP (ref 1.8–7.4)
NEUTROPHILS NFR BLD AUTO: 43.9 % — SIGNIFICANT CHANGE UP (ref 43–77)
NRBC # BLD: 0 /100 WBCS — SIGNIFICANT CHANGE UP (ref 0–0)
PLATELET # BLD AUTO: 28 K/UL — LOW (ref 150–400)
POTASSIUM SERPL-MCNC: 3.2 MMOL/L — LOW (ref 3.5–5.3)
POTASSIUM SERPL-SCNC: 3.2 MMOL/L — LOW (ref 3.5–5.3)
RBC # BLD: 3.2 M/UL — LOW (ref 3.8–5.2)
RBC # FLD: 12.6 % — SIGNIFICANT CHANGE UP (ref 10.3–14.5)
SODIUM SERPL-SCNC: 140 MMOL/L — SIGNIFICANT CHANGE UP (ref 135–145)
WBC # BLD: 4.21 K/UL — SIGNIFICANT CHANGE UP (ref 3.8–10.5)
WBC # FLD AUTO: 4.21 K/UL — SIGNIFICANT CHANGE UP (ref 3.8–10.5)

## 2023-01-03 ENCOUNTER — RESULT REVIEW (OUTPATIENT)
Age: 53
End: 2023-01-03

## 2023-01-03 ENCOUNTER — APPOINTMENT (OUTPATIENT)
Dept: INFUSION THERAPY | Facility: HOSPITAL | Age: 53
End: 2023-01-03

## 2023-01-03 DIAGNOSIS — E86.0 DEHYDRATION: ICD-10-CM

## 2023-01-03 LAB
ALBUMIN SERPL ELPH-MCNC: 4.4 G/DL
ALP BLD-CCNC: 106 U/L
ALT SERPL-CCNC: 21 U/L
ANION GAP SERPL CALC-SCNC: 13 MMOL/L
ANION GAP SERPL CALC-SCNC: 13 MMOL/L — SIGNIFICANT CHANGE UP (ref 5–17)
AST SERPL-CCNC: 22 U/L
BASOPHILS # BLD AUTO: 0.01 K/UL — SIGNIFICANT CHANGE UP (ref 0–0.2)
BASOPHILS NFR BLD AUTO: 0.2 % — SIGNIFICANT CHANGE UP (ref 0–2)
BILIRUB SERPL-MCNC: 0.4 MG/DL
BUN SERPL-MCNC: 21 MG/DL — SIGNIFICANT CHANGE UP (ref 7–23)
BUN SERPL-MCNC: 28 MG/DL
CALCIUM SERPL-MCNC: 8.8 MG/DL
CALCIUM SERPL-MCNC: 8.8 MG/DL — SIGNIFICANT CHANGE UP (ref 8.4–10.5)
CHLORIDE SERPL-SCNC: 101 MMOL/L — SIGNIFICANT CHANGE UP (ref 96–108)
CHLORIDE SERPL-SCNC: 97 MMOL/L
CO2 SERPL-SCNC: 26 MMOL/L — SIGNIFICANT CHANGE UP (ref 22–31)
CO2 SERPL-SCNC: 30 MMOL/L
CREAT SERPL-MCNC: 1.34 MG/DL — HIGH (ref 0.5–1.3)
CREAT SERPL-MCNC: 1.56 MG/DL
EGFR: 40 ML/MIN/1.73M2
EGFR: 47 ML/MIN/1.73M2 — LOW
EOSINOPHIL # BLD AUTO: 0 K/UL — SIGNIFICANT CHANGE UP (ref 0–0.5)
EOSINOPHIL NFR BLD AUTO: 0 % — SIGNIFICANT CHANGE UP (ref 0–6)
GLUCOSE SERPL-MCNC: 135 MG/DL
GLUCOSE SERPL-MCNC: 97 MG/DL — SIGNIFICANT CHANGE UP (ref 70–99)
HCT VFR BLD CALC: 25.1 % — LOW (ref 34.5–45)
HGB BLD-MCNC: 9 G/DL — LOW (ref 11.5–15.5)
IMM GRANULOCYTES NFR BLD AUTO: 0.5 % — SIGNIFICANT CHANGE UP (ref 0–0.9)
LYMPHOCYTES # BLD AUTO: 1.53 K/UL — SIGNIFICANT CHANGE UP (ref 1–3.3)
LYMPHOCYTES # BLD AUTO: 37.6 % — SIGNIFICANT CHANGE UP (ref 13–44)
MCHC RBC-ENTMCNC: 30.2 PG — SIGNIFICANT CHANGE UP (ref 27–34)
MCHC RBC-ENTMCNC: 35.9 G/DL — SIGNIFICANT CHANGE UP (ref 32–36)
MCV RBC AUTO: 84.2 FL — SIGNIFICANT CHANGE UP (ref 80–100)
MONOCYTES # BLD AUTO: 0.47 K/UL — SIGNIFICANT CHANGE UP (ref 0–0.9)
MONOCYTES NFR BLD AUTO: 11.5 % — SIGNIFICANT CHANGE UP (ref 2–14)
NEUTROPHILS # BLD AUTO: 2.04 K/UL — SIGNIFICANT CHANGE UP (ref 1.8–7.4)
NEUTROPHILS NFR BLD AUTO: 50.2 % — SIGNIFICANT CHANGE UP (ref 43–77)
NRBC # BLD: 0 /100 WBCS — SIGNIFICANT CHANGE UP (ref 0–0)
PLATELET # BLD AUTO: 41 K/UL — LOW (ref 150–400)
POTASSIUM SERPL-MCNC: 3.2 MMOL/L — LOW (ref 3.5–5.3)
POTASSIUM SERPL-SCNC: 3.2 MMOL/L — LOW (ref 3.5–5.3)
POTASSIUM SERPL-SCNC: 3.3 MMOL/L
PROT SERPL-MCNC: 6.9 G/DL
RBC # BLD: 2.98 M/UL — LOW (ref 3.8–5.2)
RBC # FLD: 12.9 % — SIGNIFICANT CHANGE UP (ref 10.3–14.5)
SODIUM SERPL-SCNC: 139 MMOL/L
SODIUM SERPL-SCNC: 139 MMOL/L — SIGNIFICANT CHANGE UP (ref 135–145)
WBC # BLD: 4.07 K/UL — SIGNIFICANT CHANGE UP (ref 3.8–10.5)
WBC # FLD AUTO: 4.07 K/UL — SIGNIFICANT CHANGE UP (ref 3.8–10.5)

## 2023-01-05 ENCOUNTER — RESULT REVIEW (OUTPATIENT)
Age: 53
End: 2023-01-05

## 2023-01-05 ENCOUNTER — APPOINTMENT (OUTPATIENT)
Dept: HEMATOLOGY ONCOLOGY | Facility: CLINIC | Age: 53
End: 2023-01-05

## 2023-01-05 LAB
BASOPHILS # BLD AUTO: 0.01 K/UL — SIGNIFICANT CHANGE UP (ref 0–0.2)
BASOPHILS NFR BLD AUTO: 0.2 % — SIGNIFICANT CHANGE UP (ref 0–2)
EOSINOPHIL # BLD AUTO: 0 K/UL — SIGNIFICANT CHANGE UP (ref 0–0.5)
EOSINOPHIL NFR BLD AUTO: 0 % — SIGNIFICANT CHANGE UP (ref 0–6)
HCT VFR BLD CALC: 25.6 % — LOW (ref 34.5–45)
HGB BLD-MCNC: 9 G/DL — LOW (ref 11.5–15.5)
IMM GRANULOCYTES NFR BLD AUTO: 0.5 % — SIGNIFICANT CHANGE UP (ref 0–0.9)
LYMPHOCYTES # BLD AUTO: 1.5 K/UL — SIGNIFICANT CHANGE UP (ref 1–3.3)
LYMPHOCYTES # BLD AUTO: 27.4 % — SIGNIFICANT CHANGE UP (ref 13–44)
MCHC RBC-ENTMCNC: 30.3 PG — SIGNIFICANT CHANGE UP (ref 27–34)
MCHC RBC-ENTMCNC: 35.2 G/DL — SIGNIFICANT CHANGE UP (ref 32–36)
MCV RBC AUTO: 86.2 FL — SIGNIFICANT CHANGE UP (ref 80–100)
MONOCYTES # BLD AUTO: 0.66 K/UL — SIGNIFICANT CHANGE UP (ref 0–0.9)
MONOCYTES NFR BLD AUTO: 12 % — SIGNIFICANT CHANGE UP (ref 2–14)
NEUTROPHILS # BLD AUTO: 3.28 K/UL — SIGNIFICANT CHANGE UP (ref 1.8–7.4)
NEUTROPHILS NFR BLD AUTO: 59.9 % — SIGNIFICANT CHANGE UP (ref 43–77)
NRBC # BLD: 0 /100 WBCS — SIGNIFICANT CHANGE UP (ref 0–0)
PLATELET # BLD AUTO: 122 K/UL — LOW (ref 150–400)
RBC # BLD: 2.97 M/UL — LOW (ref 3.8–5.2)
RBC # FLD: 13.1 % — SIGNIFICANT CHANGE UP (ref 10.3–14.5)
WBC # BLD: 5.48 K/UL — SIGNIFICANT CHANGE UP (ref 3.8–10.5)
WBC # FLD AUTO: 5.48 K/UL — SIGNIFICANT CHANGE UP (ref 3.8–10.5)

## 2023-01-06 ENCOUNTER — APPOINTMENT (OUTPATIENT)
Dept: HEMATOLOGY ONCOLOGY | Facility: CLINIC | Age: 53
End: 2023-01-06

## 2023-01-06 ENCOUNTER — APPOINTMENT (OUTPATIENT)
Dept: INFUSION THERAPY | Facility: HOSPITAL | Age: 53
End: 2023-01-06

## 2023-01-06 LAB
ALBUMIN SERPL ELPH-MCNC: 4.3 G/DL
ALP BLD-CCNC: 84 U/L
ALT SERPL-CCNC: 26 U/L
ANION GAP SERPL CALC-SCNC: 12 MMOL/L
AST SERPL-CCNC: 26 U/L
BILIRUB SERPL-MCNC: 0.2 MG/DL
BUN SERPL-MCNC: 22 MG/DL
CALCIUM SERPL-MCNC: 8.7 MG/DL
CHLORIDE SERPL-SCNC: 100 MMOL/L
CO2 SERPL-SCNC: 28 MMOL/L
CREAT SERPL-MCNC: 1.35 MG/DL
EGFR: 47 ML/MIN/1.73M2
GLUCOSE SERPL-MCNC: 149 MG/DL
POTASSIUM SERPL-SCNC: 3.7 MMOL/L
PROT SERPL-MCNC: 6.7 G/DL
SODIUM SERPL-SCNC: 140 MMOL/L

## 2023-01-12 ENCOUNTER — RESULT REVIEW (OUTPATIENT)
Age: 53
End: 2023-01-12

## 2023-01-12 ENCOUNTER — TRANSCRIPTION ENCOUNTER (OUTPATIENT)
Age: 53
End: 2023-01-12

## 2023-01-12 ENCOUNTER — APPOINTMENT (OUTPATIENT)
Dept: HEMATOLOGY ONCOLOGY | Facility: CLINIC | Age: 53
End: 2023-01-12

## 2023-01-12 LAB
ALBUMIN SERPL ELPH-MCNC: 4.7 G/DL
ALP BLD-CCNC: 79 U/L
ALT SERPL-CCNC: 17 U/L
ANION GAP SERPL CALC-SCNC: 13 MMOL/L
AST SERPL-CCNC: 23 U/L
BASOPHILS # BLD AUTO: 0.04 K/UL — SIGNIFICANT CHANGE UP (ref 0–0.2)
BASOPHILS NFR BLD AUTO: 0.6 % — SIGNIFICANT CHANGE UP (ref 0–2)
BILIRUB SERPL-MCNC: 0.2 MG/DL
BUN SERPL-MCNC: 17 MG/DL
CALCIUM SERPL-MCNC: 9.4 MG/DL
CHLORIDE SERPL-SCNC: 103 MMOL/L
CO2 SERPL-SCNC: 26 MMOL/L
CREAT SERPL-MCNC: 1.32 MG/DL
EGFR: 48 ML/MIN/1.73M2
EOSINOPHIL # BLD AUTO: 0.01 K/UL — SIGNIFICANT CHANGE UP (ref 0–0.5)
EOSINOPHIL NFR BLD AUTO: 0.1 % — SIGNIFICANT CHANGE UP (ref 0–6)
GLUCOSE SERPL-MCNC: 97 MG/DL
HCT VFR BLD CALC: 27.4 % — LOW (ref 34.5–45)
HGB BLD-MCNC: 9.4 G/DL — LOW (ref 11.5–15.5)
IMM GRANULOCYTES NFR BLD AUTO: 0.4 % — SIGNIFICANT CHANGE UP (ref 0–0.9)
LYMPHOCYTES # BLD AUTO: 1.6 K/UL — SIGNIFICANT CHANGE UP (ref 1–3.3)
LYMPHOCYTES # BLD AUTO: 23.6 % — SIGNIFICANT CHANGE UP (ref 13–44)
MCHC RBC-ENTMCNC: 30.5 PG — SIGNIFICANT CHANGE UP (ref 27–34)
MCHC RBC-ENTMCNC: 34.3 G/DL — SIGNIFICANT CHANGE UP (ref 32–36)
MCV RBC AUTO: 89 FL — SIGNIFICANT CHANGE UP (ref 80–100)
MONOCYTES # BLD AUTO: 1.01 K/UL — HIGH (ref 0–0.9)
MONOCYTES NFR BLD AUTO: 14.9 % — HIGH (ref 2–14)
NEUTROPHILS # BLD AUTO: 4.1 K/UL — SIGNIFICANT CHANGE UP (ref 1.8–7.4)
NEUTROPHILS NFR BLD AUTO: 60.4 % — SIGNIFICANT CHANGE UP (ref 43–77)
NRBC # BLD: 0 /100 WBCS — SIGNIFICANT CHANGE UP (ref 0–0)
PLATELET # BLD AUTO: 547 K/UL — HIGH (ref 150–400)
POTASSIUM SERPL-SCNC: 5 MMOL/L
PROT SERPL-MCNC: 7.2 G/DL
RBC # BLD: 3.08 M/UL — LOW (ref 3.8–5.2)
RBC # FLD: 14.6 % — HIGH (ref 10.3–14.5)
SODIUM SERPL-SCNC: 142 MMOL/L
WBC # BLD: 6.79 K/UL — SIGNIFICANT CHANGE UP (ref 3.8–10.5)
WBC # FLD AUTO: 6.79 K/UL — SIGNIFICANT CHANGE UP (ref 3.8–10.5)

## 2023-01-13 ENCOUNTER — APPOINTMENT (OUTPATIENT)
Dept: HEMATOLOGY ONCOLOGY | Facility: CLINIC | Age: 53
End: 2023-01-13

## 2023-01-17 ENCOUNTER — APPOINTMENT (OUTPATIENT)
Dept: HEMATOLOGY ONCOLOGY | Facility: CLINIC | Age: 53
End: 2023-01-17
Payer: COMMERCIAL

## 2023-01-17 ENCOUNTER — APPOINTMENT (OUTPATIENT)
Dept: RHEUMATOLOGY | Facility: CLINIC | Age: 53
End: 2023-01-17

## 2023-01-17 VITALS
HEART RATE: 95 BPM | BODY MASS INDEX: 25.15 KG/M2 | RESPIRATION RATE: 16 BRPM | SYSTOLIC BLOOD PRESSURE: 154 MMHG | DIASTOLIC BLOOD PRESSURE: 101 MMHG | TEMPERATURE: 97 F | OXYGEN SATURATION: 99 % | WEIGHT: 173.28 LBS

## 2023-01-17 PROCEDURE — 99214 OFFICE O/P EST MOD 30 MIN: CPT

## 2023-01-18 ENCOUNTER — NON-APPOINTMENT (OUTPATIENT)
Age: 53
End: 2023-01-18

## 2023-01-18 NOTE — PHYSICAL EXAM
[Fully active, able to carry on all pre-disease performance without restriction] : Status 0 - Fully active, able to carry on all pre-disease performance without restriction [Normal] : affect appropriate [de-identified] : Thyromegaly, L>R [de-identified] : B/L br w/o nipple retraction skin dimpling or palp masses. B/L ax w/o palp AXLNs

## 2023-01-18 NOTE — HISTORY OF PRESENT ILLNESS
[de-identified] : The patient reports having had cosmetic surgery to remove "fat from my axilla," with the patient noting that this was "extra breast tissue."  She had a first procedure in the right axilla approximately 15 years ago with pathology returning negative per patient reporting, and more recently had a similar procedure in her left axilla, "left armpit fat," with the patient also noting that the pathology returned negative.  I do not have a copy of either of these pathology reports.\par \par The patient reports she was not happy with the result, noting that there seemed to be residual tissue in the left axilla.  She kept palpating this area.  She ultimately felt a lump in the left breast, but not near the armpit.  She returned to her plastic surgeon, who palpated the same and noted this was "definitely something else."  Consequently, she was referred for a diagnostic mammogram on 10/10/2022 (with her last prior mammogram approximately 1-2 years earlier).  The right mammogram returned unremarkable; in the upper left breast, middle depth, there was an area of subtle architectural distortion; there was also stable benign-appearing mass in the central left breast at the middle to posterior depth; there were bilateral benign-appearing calcifications.  An ultrasound performed on that same date in the left breast demonstrated left breast 1 o'clock axis 8 centimeters from nipple, likely corresponding to the subtle distortion seen mammographically, there was an 8 x 8 x 8 millimeter irregular hypoechoic mass, which appeared to be associated with some subtle sonographic distortion with ultrasound-guided core biopsy recommended; in the left breast 5 o'clock axis 3 centimeters from nipple, where the patient felt a lump, there was a vague 15 x 6 x 10 millimeter irregular hypoechoic mass; there was no suspicious sonographic findings in the left axilla.  The patient ultimately went on to have an ultrasound-guided core biopsy on 10/14/2022 of the left breast 1 o'clock lesion with a finding of invasive ductal carcinoma, moderately differentiated, estrogen receptor positive, progesterone receptor positive and HER-2/michelle negative; ultrasound-guided biopsy of left breast 5 o'clock lesion showing invasive ductal carcinoma, moderately differentiated, progesterone receptor positive, HER-2/michelle positive.  There was a comment that the 2 lesions were 5.4 centimeters apart, and a pretreatment breast MRI was recommended.  The patient then went on to see Dr. Tierra Samayoa and a bilateral breast MRI was performed on 10/20/2022 with findings of a mass and non-mass enhancement surrounding the biopsy marker in the upper outer breast middle to posterior depth spanning up to 3.5 centimeters x 2.5 centimeters corresponding to the site of newly diagnosed malignancy; there was a mass and non-mass enhancement in the lower outer left breast 5 o'clock position anterior to middle depth, spanning 3.7 x 2.0 centimeters surrounding a biopsy marker; the enhancement was fairly superficial in location, part of which was located in subcutaneous fat without overlying skin enhancement; there was a 6 millimeter focus of irregular, superficial, non-mass enhancement in the lower outer anterior breast located approximately 1.3-1.4 centimeters from nipple within contiguous non-mass enhancement posterior to the index carcinoma at the 5 o'clock position; no axillary right adenopathy was noted; no significant internal mammary adenopathy was noted; there were 2 adjacent prominent level 1 lymph nodes with possible thickened cortex measuring up to 1.5 centimeters in length in the left axilla with no overlying skin thickening or edema in the axilla.  The patient reports being scheduled for a biopsy of the left axillary lymph nodes within the next several days.\par \par The patient is seen today in consultation regarding further treatment recommendations.  She notes a good appetite, stable weight, and excellent performance status.\par \par A review of 10 systems is remarkable for anxiety since her breast cancer diagnosis; she has a history of chronic aches in her feet, hands, and right hip for which an extensive prior workups were negative; she has seen rheumatologist for multiple tests, which all returned negative per patient's report.\par  [de-identified] : The patient presents today for follow up.\par \par She is s/p 2 of TCHP, tolerated fairly well except that on the date of treatment her creat had increased (prev 0.6 to 1.1 on date if treatment) but with the carbo dose based on pre-treatment creatinine. She had follow up BMPs with creat as high as 1.56  in the intertreatment interval. Pt receive IVF wit gradual improvement but not back to baseline. TCHP cycle 2 held / delayed but creat did not recover yet to baseline. Spoke with pt and recommended switching to ACTHP instead - she agreed. \par \par Here today to discuss ACTHP and plan on moving forward.\par \par She denies any current complaints.  Notes a good appetite, stable weight and excellent performance status.  She has stopped working for duration of treatment. \par

## 2023-01-19 ENCOUNTER — APPOINTMENT (OUTPATIENT)
Dept: NEPHROLOGY | Facility: CLINIC | Age: 53
End: 2023-01-19
Payer: COMMERCIAL

## 2023-01-19 VITALS
WEIGHT: 173 LBS | HEART RATE: 85 BPM | TEMPERATURE: 97.2 F | DIASTOLIC BLOOD PRESSURE: 106 MMHG | HEIGHT: 69.6 IN | OXYGEN SATURATION: 99 % | SYSTOLIC BLOOD PRESSURE: 164 MMHG | BODY MASS INDEX: 25.05 KG/M2

## 2023-01-19 PROCEDURE — 99205 OFFICE O/P NEW HI 60 MIN: CPT | Mod: 25

## 2023-01-19 PROCEDURE — 36415 COLL VENOUS BLD VENIPUNCTURE: CPT

## 2023-01-20 ENCOUNTER — NON-APPOINTMENT (OUTPATIENT)
Age: 53
End: 2023-01-20

## 2023-01-20 ENCOUNTER — APPOINTMENT (OUTPATIENT)
Dept: INFUSION THERAPY | Facility: HOSPITAL | Age: 53
End: 2023-01-20

## 2023-01-20 RX ORDER — RAMIPRIL 5 MG
1 CAPSULE ORAL
Qty: 0 | Refills: 0 | DISCHARGE

## 2023-01-20 NOTE — HISTORY OF PRESENT ILLNESS
[FreeTextEntry1] : Patient is a 52 year old female with HTN, HLD, malignant neoplasm of left breast on chemotherapy here for initial visit for discussion of labs and evaluation of renal function. \par \par She received TCHP (carboplatin based) x 2 cycles w/ decrease in egFR from 100-->60-->40-->46. \par Her 3rd cycle was subsequently cancelled and she is now a switch to ACTHP. \par \par Patient also on ramipril for HTN.\par \par Denies dysuria, gross hematuria, SOB, CP, cough, expectoration, fever, chills, palpitation, syncope, urgency, hesitancy, swelling on feet, joint pain. No history of chronic NSAID use, nephrolithiasis or renal diseases in the family. \par \par REVIEW OF SYSTEM:  All systems were reviewed in detail.  Pertinent positive and negatives have been mentioned in history of present illness.  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

## 2023-01-24 LAB
ALBUMIN SERPL ELPH-MCNC: 4.7 G/DL
ALP BLD-CCNC: 91 U/L
ALT SERPL-CCNC: 23 U/L
ANION GAP SERPL CALC-SCNC: 13 MMOL/L
APPEARANCE: CLEAR
AST SERPL-CCNC: 26 U/L
BACTERIA: NEGATIVE
BASOPHILS # BLD AUTO: 0.09 K/UL
BASOPHILS NFR BLD AUTO: 1.1 %
BILIRUB SERPL-MCNC: 0.3 MG/DL
BILIRUBIN URINE: NEGATIVE
BLOOD URINE: NEGATIVE
BUN SERPL-MCNC: 23 MG/DL
CALCIUM SERPL-MCNC: 10.2 MG/DL
CALCIUM SERPL-MCNC: 10.2 MG/DL
CHLORIDE SERPL-SCNC: 100 MMOL/L
CK SERPL-CCNC: 62 U/L
CO2 SERPL-SCNC: 26 MMOL/L
COLOR: COLORLESS
CREAT SERPL-MCNC: 1.34 MG/DL
CREAT SPEC-SCNC: 30 MG/DL
EGFR: 47 ML/MIN/1.73M2
EOSINOPHIL # BLD AUTO: 0.03 K/UL
EOSINOPHIL NFR BLD AUTO: 0.4 %
GLUCOSE QUALITATIVE U: NEGATIVE
GLUCOSE SERPL-MCNC: 101 MG/DL
HCT VFR BLD CALC: 29.2 %
HGB BLD-MCNC: 9.9 G/DL
HYALINE CASTS: 0 /LPF
IMM GRANULOCYTES NFR BLD AUTO: 0.4 %
KETONES URINE: NEGATIVE
LEUKOCYTE ESTERASE URINE: NEGATIVE
LYMPHOCYTES # BLD AUTO: 2.08 K/UL
LYMPHOCYTES NFR BLD AUTO: 26.6 %
MAN DIFF?: NORMAL
MCHC RBC-ENTMCNC: 30.4 PG
MCHC RBC-ENTMCNC: 33.9 GM/DL
MCV RBC AUTO: 89.6 FL
MICROALBUMIN 24H UR DL<=1MG/L-MCNC: <1.2 MG/DL
MICROALBUMIN/CREAT 24H UR-RTO: NORMAL MG/G
MICROSCOPIC-UA: NORMAL
MONOCYTES # BLD AUTO: 1.01 K/UL
MONOCYTES NFR BLD AUTO: 12.9 %
NEUTROPHILS # BLD AUTO: 4.59 K/UL
NEUTROPHILS NFR BLD AUTO: 58.6 %
NITRITE URINE: NEGATIVE
PARATHYROID HORMONE INTACT: 34 PG/ML
PH URINE: 6
PLATELET # BLD AUTO: 461 K/UL
POTASSIUM SERPL-SCNC: 4.7 MMOL/L
PROT SERPL-MCNC: 7.5 G/DL
PROTEIN URINE: NEGATIVE
RBC # BLD: 3.26 M/UL
RBC # FLD: 15.5 %
RED BLOOD CELLS URINE: 1 /HPF
SODIUM SERPL-SCNC: 138 MMOL/L
SPECIFIC GRAVITY URINE: 1
SQUAMOUS EPITHELIAL CELLS: 1 /HPF
URATE SERPL-MCNC: 4.4 MG/DL
UROBILINOGEN URINE: NORMAL
WBC # FLD AUTO: 7.83 K/UL
WHITE BLOOD CELLS URINE: 2 /HPF

## 2023-01-30 ENCOUNTER — FORM ENCOUNTER (OUTPATIENT)
Age: 53
End: 2023-01-30

## 2023-02-02 ENCOUNTER — LABORATORY RESULT (OUTPATIENT)
Age: 53
End: 2023-02-02

## 2023-02-02 ENCOUNTER — APPOINTMENT (OUTPATIENT)
Dept: NEPHROLOGY | Facility: CLINIC | Age: 53
End: 2023-02-02
Payer: COMMERCIAL

## 2023-02-02 VITALS
DIASTOLIC BLOOD PRESSURE: 75 MMHG | SYSTOLIC BLOOD PRESSURE: 121 MMHG | BODY MASS INDEX: 25.18 KG/M2 | HEIGHT: 69 IN | RESPIRATION RATE: 18 BRPM | OXYGEN SATURATION: 96 % | HEART RATE: 95 BPM | WEIGHT: 170 LBS

## 2023-02-02 PROCEDURE — 36415 COLL VENOUS BLD VENIPUNCTURE: CPT

## 2023-02-02 PROCEDURE — 99214 OFFICE O/P EST MOD 30 MIN: CPT | Mod: 25

## 2023-02-03 ENCOUNTER — RESULT REVIEW (OUTPATIENT)
Age: 53
End: 2023-02-03

## 2023-02-03 ENCOUNTER — APPOINTMENT (OUTPATIENT)
Dept: HEMATOLOGY ONCOLOGY | Facility: CLINIC | Age: 53
End: 2023-02-03
Payer: COMMERCIAL

## 2023-02-03 ENCOUNTER — APPOINTMENT (OUTPATIENT)
Dept: INFUSION THERAPY | Facility: HOSPITAL | Age: 53
End: 2023-02-03

## 2023-02-03 VITALS
TEMPERATURE: 97.7 F | DIASTOLIC BLOOD PRESSURE: 92 MMHG | SYSTOLIC BLOOD PRESSURE: 141 MMHG | OXYGEN SATURATION: 99 % | RESPIRATION RATE: 16 BRPM | HEIGHT: 69.02 IN | WEIGHT: 171.08 LBS | HEART RATE: 84 BPM | BODY MASS INDEX: 25.34 KG/M2

## 2023-02-03 LAB
BASOPHILS # BLD AUTO: 0 K/UL — SIGNIFICANT CHANGE UP (ref 0–0.2)
BASOPHILS NFR BLD AUTO: 0 % — SIGNIFICANT CHANGE UP (ref 0–2)
EOSINOPHIL # BLD AUTO: 0.13 K/UL — SIGNIFICANT CHANGE UP (ref 0–0.5)
EOSINOPHIL NFR BLD AUTO: 1 % — SIGNIFICANT CHANGE UP (ref 0–6)
HCT VFR BLD CALC: 22.4 % — LOW (ref 34.5–45)
HGB BLD-MCNC: 8.1 G/DL — LOW (ref 11.5–15.5)
HYPOSEGMENTATION: PRESENT — SIGNIFICANT CHANGE UP
LYMPHOCYTES # BLD AUTO: 17 % — SIGNIFICANT CHANGE UP (ref 13–44)
LYMPHOCYTES # BLD AUTO: 2.27 K/UL — SIGNIFICANT CHANGE UP (ref 1–3.3)
MCHC RBC-ENTMCNC: 31.9 PG — SIGNIFICANT CHANGE UP (ref 27–34)
MCHC RBC-ENTMCNC: 36.2 G/DL — HIGH (ref 32–36)
MCV RBC AUTO: 88.2 FL — SIGNIFICANT CHANGE UP (ref 80–100)
METAMYELOCYTES # FLD: 4 % — HIGH (ref 0–0)
MONOCYTES # BLD AUTO: 1.07 K/UL — HIGH (ref 0–0.9)
MONOCYTES NFR BLD AUTO: 8 % — SIGNIFICANT CHANGE UP (ref 2–14)
MYELOCYTES NFR BLD: 1 % — HIGH (ref 0–0)
NEUTROPHILS # BLD AUTO: 9.23 K/UL — HIGH (ref 1.8–7.4)
NEUTROPHILS NFR BLD AUTO: 69 % — SIGNIFICANT CHANGE UP (ref 43–77)
NRBC # BLD: 3 /100 — HIGH (ref 0–0)
NRBC # BLD: SIGNIFICANT CHANGE UP /100 WBCS (ref 0–0)
PLAT MORPH BLD: NORMAL — SIGNIFICANT CHANGE UP
PLATELET # BLD AUTO: 151 K/UL — SIGNIFICANT CHANGE UP (ref 150–400)
RBC # BLD: 2.54 M/UL — LOW (ref 3.8–5.2)
RBC # FLD: 15.2 % — HIGH (ref 10.3–14.5)
RBC BLD AUTO: SIGNIFICANT CHANGE UP
WBC # BLD: 13.38 K/UL — HIGH (ref 3.8–10.5)
WBC # FLD AUTO: 13.38 K/UL — HIGH (ref 3.8–10.5)

## 2023-02-03 PROCEDURE — 99214 OFFICE O/P EST MOD 30 MIN: CPT

## 2023-02-03 RX ORDER — RAMIPRIL 2.5 MG/1
2.5 CAPSULE ORAL
Qty: 90 | Refills: 0 | Status: DISCONTINUED | COMMUNITY
Start: 2021-11-09 | End: 2023-02-03

## 2023-02-03 NOTE — PHYSICAL EXAM
[Fully active, able to carry on all pre-disease performance without restriction] : Status 0 - Fully active, able to carry on all pre-disease performance without restriction [Normal] : affect appropriate [de-identified] : Thyromegaly, L>R [de-identified] : No skin or nipple changes either breast.  Right breast with no discrete palpable masses, no palpable axillary nodes.   Left breast with no discrete palpable masses, no palpable axillary nodes.

## 2023-02-03 NOTE — HISTORY OF PRESENT ILLNESS
[de-identified] : The patient reports having had cosmetic surgery to remove "fat from my axilla," with the patient noting that this was "extra breast tissue."  She had a first procedure in the right axilla approximately 15 years ago with pathology returning negative per patient reporting, and more recently had a similar procedure in her left axilla, "left armpit fat," with the patient also noting that the pathology returned negative.  I do not have a copy of either of these pathology reports.\par \par The patient reports she was not happy with the result, noting that there seemed to be residual tissue in the left axilla.  She kept palpating this area.  She ultimately felt a lump in the left breast, but not near the armpit.  She returned to her plastic surgeon, who palpated the same and noted this was "definitely something else."  Consequently, she was referred for a diagnostic mammogram on 10/10/2022 (with her last prior mammogram approximately 1-2 years earlier).  The right mammogram returned unremarkable; in the upper left breast, middle depth, there was an area of subtle architectural distortion; there was also stable benign-appearing mass in the central left breast at the middle to posterior depth; there were bilateral benign-appearing calcifications.  An ultrasound performed on that same date in the left breast demonstrated left breast 1 o'clock axis 8 centimeters from nipple, likely corresponding to the subtle distortion seen mammographically, there was an 8 x 8 x 8 millimeter irregular hypoechoic mass, which appeared to be associated with some subtle sonographic distortion with ultrasound-guided core biopsy recommended; in the left breast 5 o'clock axis 3 centimeters from nipple, where the patient felt a lump, there was a vague 15 x 6 x 10 millimeter irregular hypoechoic mass; there was no suspicious sonographic findings in the left axilla.  The patient ultimately went on to have an ultrasound-guided core biopsy on 10/14/2022 of the left breast 1 o'clock lesion with a finding of invasive ductal carcinoma, moderately differentiated, estrogen receptor positive, progesterone receptor positive and HER-2/michelle negative; ultrasound-guided biopsy of left breast 5 o'clock lesion showing invasive ductal carcinoma, moderately differentiated, progesterone receptor positive, HER-2/michelle positive.  There was a comment that the 2 lesions were 5.4 centimeters apart, and a pretreatment breast MRI was recommended.  The patient then went on to see Dr. Tierra Samayoa and a bilateral breast MRI was performed on 10/20/2022 with findings of a mass and non-mass enhancement surrounding the biopsy marker in the upper outer breast middle to posterior depth spanning up to 3.5 centimeters x 2.5 centimeters corresponding to the site of newly diagnosed malignancy; there was a mass and non-mass enhancement in the lower outer left breast 5 o'clock position anterior to middle depth, spanning 3.7 x 2.0 centimeters surrounding a biopsy marker; the enhancement was fairly superficial in location, part of which was located in subcutaneous fat without overlying skin enhancement; there was a 6 millimeter focus of irregular, superficial, non-mass enhancement in the lower outer anterior breast located approximately 1.3-1.4 centimeters from nipple within contiguous non-mass enhancement posterior to the index carcinoma at the 5 o'clock position; no axillary right adenopathy was noted; no significant internal mammary adenopathy was noted; there were 2 adjacent prominent level 1 lymph nodes with possible thickened cortex measuring up to 1.5 centimeters in length in the left axilla with no overlying skin thickening or edema in the axilla.  The patient reports being scheduled for a biopsy of the left axillary lymph nodes within the next several days.\par \par The patient is seen today in consultation regarding further treatment recommendations.  She notes a good appetite, stable weight, and excellent performance status.\par \par A review of 10 systems is remarkable for anxiety since her breast cancer diagnosis; she has a history of chronic aches in her feet, hands, and right hip for which an extensive prior workups were negative; she has seen rheumatologist for multiple tests, which all returned negative per patient's report.\par  [de-identified] : She is s/p 2 of TCHP, tolerated fairly well except that on the date of treatment her creat had increased (prev 0.6 to 1.1 on date if treatment) but with the carbo dose based on pre-treatment creatinine. She had follow up BMPs with creat as high as 1.56  in the intertreatment interval. Pt receive IVF wit gradual improvement but not back to baseline. TCHP cycle 2 held / delayed but creat did not recover yet to baseline. Spoke with pt and recommended switching to ACTHP instead - she agreed. \par \par S/p C1 of AC.  The patient states she feels much better on after this treatment than previous treatment.   She states her fatigue has improved, took less time to recover after this treatment.  Her appetite has also improved.  \par \par She denies any current complaints.  Notes a good appetite, stable weight and excellent performance status.  She has stopped working for duration of treatment. \par

## 2023-02-04 LAB
ALBUMIN SERPL ELPH-MCNC: 4.2 G/DL — SIGNIFICANT CHANGE UP (ref 3.3–5)
ALP SERPL-CCNC: 105 U/L — SIGNIFICANT CHANGE UP (ref 40–120)
ALT FLD-CCNC: 18 U/L — SIGNIFICANT CHANGE UP (ref 10–45)
ANION GAP SERPL CALC-SCNC: 13 MMOL/L — SIGNIFICANT CHANGE UP (ref 5–17)
AST SERPL-CCNC: 20 U/L — SIGNIFICANT CHANGE UP (ref 10–40)
BILIRUB SERPL-MCNC: <0.2 MG/DL — SIGNIFICANT CHANGE UP (ref 0.2–1.2)
BUN SERPL-MCNC: 17 MG/DL — SIGNIFICANT CHANGE UP (ref 7–23)
CALCIUM SERPL-MCNC: 8.9 MG/DL — SIGNIFICANT CHANGE UP (ref 8.4–10.5)
CHLORIDE SERPL-SCNC: 102 MMOL/L — SIGNIFICANT CHANGE UP (ref 96–108)
CO2 SERPL-SCNC: 25 MMOL/L — SIGNIFICANT CHANGE UP (ref 22–31)
CREAT SERPL-MCNC: 1.24 MG/DL — SIGNIFICANT CHANGE UP (ref 0.5–1.3)
EGFR: 52 ML/MIN/1.73M2 — LOW
GLUCOSE SERPL-MCNC: 107 MG/DL — HIGH (ref 70–99)
POTASSIUM SERPL-MCNC: 3.4 MMOL/L — LOW (ref 3.5–5.3)
POTASSIUM SERPL-SCNC: 3.4 MMOL/L — LOW (ref 3.5–5.3)
PROT SERPL-MCNC: 6.6 G/DL — SIGNIFICANT CHANGE UP (ref 6–8.3)
SODIUM SERPL-SCNC: 140 MMOL/L — SIGNIFICANT CHANGE UP (ref 135–145)

## 2023-02-07 NOTE — HISTORY OF PRESENT ILLNESS
[FreeTextEntry1] : Patient is a 52 year old female with HTN, HLD, malignant neoplasm of left breast on chemotherapy here for follow visit for discussion of labs and evaluation of renal function. \par \par She received TCHP (carboplatin based) x 2 cycles w/ decrease in egFR from 100-->60-->40-->46. \par Her 3rd cycle was subsequently cancelled and she is now a switch to ACTHP s/p 1 session\par \par Reports feeling better after switch in chemotherapy\par No complaints\par Patient has been taken off ramipril since last clinic visit\par ECHO done since last clinic visit WNL\par \par Denies dysuria, gross hematuria, SOB, CP, cough, expectoration, fever, chills, palpitation, syncope, urgency, hesitancy, swelling on feet, joint pain. No history of chronic NSAID use, nephrolithiasis or renal diseases in the family. \par \par REVIEW OF SYSTEM:  All systems were reviewed in detail.  Pertinent positive and negatives have been mentioned in history of present illness.  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

## 2023-02-07 NOTE — ASSESSMENT
[FreeTextEntry1] : 52 year old female with HTN, HLD, malignant neoplasm of left breast on chemotherapy here for initial visit for discussion of labs and evaluation of renal function. \par \par She received TCHP (carboplatin based) x 2 cycles w/ decrease in egFR from 100-->60-->40-->46. \par Her 3rd cycle was subsequently cancelled and she is now a switch to ACTHP. s/p 1 cycle\par Recent ECHO WNL\par \par \par 1. Acute kidney injury \par -Most likely secondary to acute tubular and interstitial injury related to platin based chemotherapy\par -Patient was also on ACEI which was taken off after last clinic visit\par -Will check labs as detailed below\par -Expecting some improvement in GFR with gradual hope of protracted improvement\par \par 2.Hypertension\par -ramipril was changed to amlodipine, BP WNL\par \par 3. Breast Ca: change in chemotherapy as mentioned in HPI above.\par \par F/u in 2 months\par

## 2023-02-09 ENCOUNTER — OUTPATIENT (OUTPATIENT)
Dept: OUTPATIENT SERVICES | Facility: HOSPITAL | Age: 53
LOS: 1 days | Discharge: HOME | End: 2023-02-09

## 2023-02-09 DIAGNOSIS — C50.919 MALIGNANT NEOPLASM OF UNSPECIFIED SITE OF UNSPECIFIED FEMALE BREAST: ICD-10-CM

## 2023-02-10 LAB
ALBUMIN SERPL ELPH-MCNC: 4.4 G/DL
ALP BLD-CCNC: 110 U/L
ALT SERPL-CCNC: 17 U/L
ANION GAP SERPL CALC-SCNC: 11 MMOL/L
APPEARANCE: CLEAR
AST SERPL-CCNC: 20 U/L
BASOPHILS # BLD AUTO: 0.11 K/UL
BASOPHILS NFR BLD AUTO: 0.9 %
BILIRUB SERPL-MCNC: <0.2 MG/DL
BILIRUBIN URINE: NEGATIVE
BLOOD URINE: NEGATIVE
BUN SERPL-MCNC: 17 MG/DL
CALCIUM SERPL-MCNC: 9.1 MG/DL
CHLORIDE SERPL-SCNC: 100 MMOL/L
CO2 SERPL-SCNC: 28 MMOL/L
COLOR: NORMAL
CREAT SERPL-MCNC: 1.24 MG/DL
CREAT SPEC-SCNC: 174 MG/DL
EGFR: 52 ML/MIN/1.73M2
EOSINOPHIL # BLD AUTO: 0 K/UL
EOSINOPHIL NFR BLD AUTO: 0 %
GLUCOSE QUALITATIVE U: NEGATIVE
GLUCOSE SERPL-MCNC: 105 MG/DL
HCT VFR BLD CALC: 23.1 %
HGB BLD-MCNC: 8 G/DL
KETONES URINE: NEGATIVE
LEUKOCYTE ESTERASE URINE: ABNORMAL
LYMPHOCYTES # BLD AUTO: 3.93 K/UL
LYMPHOCYTES NFR BLD AUTO: 31.6 %
MAN DIFF?: NORMAL
MCHC RBC-ENTMCNC: 32.4 PG
MCHC RBC-ENTMCNC: 34.6 GM/DL
MCV RBC AUTO: 93.5 FL
MICROALBUMIN 24H UR DL<=1MG/L-MCNC: 2 MG/DL
MICROALBUMIN/CREAT 24H UR-RTO: 11 MG/G
MONOCYTES # BLD AUTO: 0.32 K/UL
MONOCYTES NFR BLD AUTO: 2.6 %
NEUTROPHILS # BLD AUTO: 8.07 K/UL
NEUTROPHILS NFR BLD AUTO: 63.2 %
NITRITE URINE: NEGATIVE
PH URINE: 6
PLATELET # BLD AUTO: 153 K/UL
POTASSIUM SERPL-SCNC: 4.3 MMOL/L
PROT SERPL-MCNC: 6.8 G/DL
PROTEIN URINE: NORMAL
RBC # BLD: 2.47 M/UL
RBC # FLD: 15.3 %
SODIUM SERPL-SCNC: 140 MMOL/L
SPECIFIC GRAVITY URINE: 1.02
UROBILINOGEN URINE: NORMAL
WBC # FLD AUTO: 12.43 K/UL

## 2023-02-13 ENCOUNTER — NON-APPOINTMENT (OUTPATIENT)
Age: 53
End: 2023-02-13

## 2023-02-13 ENCOUNTER — RESULT REVIEW (OUTPATIENT)
Age: 53
End: 2023-02-13

## 2023-02-13 ENCOUNTER — APPOINTMENT (OUTPATIENT)
Dept: HEMATOLOGY ONCOLOGY | Facility: CLINIC | Age: 53
End: 2023-02-13
Payer: COMMERCIAL

## 2023-02-13 ENCOUNTER — OUTPATIENT (OUTPATIENT)
Dept: OUTPATIENT SERVICES | Facility: HOSPITAL | Age: 53
LOS: 1 days | End: 2023-02-13
Payer: COMMERCIAL

## 2023-02-13 VITALS
HEIGHT: 69.02 IN | HEART RATE: 92 BPM | TEMPERATURE: 97.9 F | OXYGEN SATURATION: 100 % | SYSTOLIC BLOOD PRESSURE: 122 MMHG | DIASTOLIC BLOOD PRESSURE: 84 MMHG | WEIGHT: 165.99 LBS | RESPIRATION RATE: 16 BRPM | BODY MASS INDEX: 24.58 KG/M2

## 2023-02-13 DIAGNOSIS — C50.912 MALIGNANT NEOPLASM OF UNSPECIFIED SITE OF LEFT FEMALE BREAST: ICD-10-CM

## 2023-02-13 LAB
BASOPHILS # BLD AUTO: 0.02 K/UL — SIGNIFICANT CHANGE UP (ref 0–0.2)
BASOPHILS NFR BLD AUTO: 1 % — SIGNIFICANT CHANGE UP (ref 0–2)
EOSINOPHIL # BLD AUTO: 0 K/UL — SIGNIFICANT CHANGE UP (ref 0–0.5)
EOSINOPHIL NFR BLD AUTO: 0 % — SIGNIFICANT CHANGE UP (ref 0–6)
HCT VFR BLD CALC: 18.4 % — CRITICAL LOW (ref 34.5–45)
HGB BLD-MCNC: 6.7 G/DL — CRITICAL LOW (ref 11.5–15.5)
LYMPHOCYTES # BLD AUTO: 0.93 K/UL — LOW (ref 1–3.3)
LYMPHOCYTES # BLD AUTO: 54 % — HIGH (ref 13–44)
MCHC RBC-ENTMCNC: 31.9 PG — SIGNIFICANT CHANGE UP (ref 27–34)
MCHC RBC-ENTMCNC: 36.1 G/DL — HIGH (ref 32–36)
MCV RBC AUTO: 88.2 FL — SIGNIFICANT CHANGE UP (ref 80–100)
MONOCYTES # BLD AUTO: 0.57 K/UL — SIGNIFICANT CHANGE UP (ref 0–0.9)
MONOCYTES NFR BLD AUTO: 33 % — HIGH (ref 2–14)
NEUTROPHILS # BLD AUTO: 0.21 K/UL — LOW (ref 1.8–7.4)
NEUTROPHILS NFR BLD AUTO: 12 % — LOW (ref 43–77)
NRBC # BLD: 3 /100 — HIGH (ref 0–0)
NRBC # BLD: SIGNIFICANT CHANGE UP /100 WBCS (ref 0–0)
PLAT MORPH BLD: NORMAL — SIGNIFICANT CHANGE UP
PLATELET # BLD AUTO: 128 K/UL — LOW (ref 150–400)
RBC # BLD: 2.04 M/UL — LOW (ref 3.8–5.2)
RBC # FLD: 14.2 % — SIGNIFICANT CHANGE UP (ref 10.3–14.5)
RBC BLD AUTO: SIGNIFICANT CHANGE UP
WBC # BLD: 1.73 K/UL — LOW (ref 3.8–10.5)
WBC # FLD AUTO: 1.73 K/UL — LOW (ref 3.8–10.5)

## 2023-02-13 PROCEDURE — 86923 COMPATIBILITY TEST ELECTRIC: CPT

## 2023-02-13 PROCEDURE — 86900 BLOOD TYPING SEROLOGIC ABO: CPT

## 2023-02-13 PROCEDURE — 86850 RBC ANTIBODY SCREEN: CPT

## 2023-02-13 PROCEDURE — 99214 OFFICE O/P EST MOD 30 MIN: CPT

## 2023-02-13 PROCEDURE — 86901 BLOOD TYPING SEROLOGIC RH(D): CPT

## 2023-02-14 ENCOUNTER — NON-APPOINTMENT (OUTPATIENT)
Age: 53
End: 2023-02-14

## 2023-02-14 ENCOUNTER — APPOINTMENT (OUTPATIENT)
Dept: INFUSION THERAPY | Facility: HOSPITAL | Age: 53
End: 2023-02-14

## 2023-02-15 DIAGNOSIS — R11.2 NAUSEA WITH VOMITING, UNSPECIFIED: ICD-10-CM

## 2023-02-15 DIAGNOSIS — Z51.89 ENCOUNTER FOR OTHER SPECIFIED AFTERCARE: ICD-10-CM

## 2023-02-17 ENCOUNTER — RESULT REVIEW (OUTPATIENT)
Age: 53
End: 2023-02-17

## 2023-02-17 ENCOUNTER — APPOINTMENT (OUTPATIENT)
Dept: HEMATOLOGY ONCOLOGY | Facility: CLINIC | Age: 53
End: 2023-02-17
Payer: COMMERCIAL

## 2023-02-17 ENCOUNTER — NON-APPOINTMENT (OUTPATIENT)
Age: 53
End: 2023-02-17

## 2023-02-17 ENCOUNTER — APPOINTMENT (OUTPATIENT)
Dept: INFUSION THERAPY | Facility: HOSPITAL | Age: 53
End: 2023-02-17

## 2023-02-17 VITALS
DIASTOLIC BLOOD PRESSURE: 93 MMHG | OXYGEN SATURATION: 100 % | HEIGHT: 69.02 IN | WEIGHT: 171.52 LBS | TEMPERATURE: 96.4 F | RESPIRATION RATE: 16 BRPM | SYSTOLIC BLOOD PRESSURE: 152 MMHG | HEART RATE: 86 BPM | BODY MASS INDEX: 25.4 KG/M2

## 2023-02-17 DIAGNOSIS — T50.905A ACUTE KIDNEY FAILURE, UNSPECIFIED: ICD-10-CM

## 2023-02-17 DIAGNOSIS — N17.9 ACUTE KIDNEY FAILURE, UNSPECIFIED: ICD-10-CM

## 2023-02-17 LAB
ALBUMIN SERPL ELPH-MCNC: 4.3 G/DL — SIGNIFICANT CHANGE UP (ref 3.3–5)
ALP SERPL-CCNC: 116 U/L — SIGNIFICANT CHANGE UP (ref 40–120)
ALT FLD-CCNC: 16 U/L — SIGNIFICANT CHANGE UP (ref 10–45)
ANION GAP SERPL CALC-SCNC: 14 MMOL/L — SIGNIFICANT CHANGE UP (ref 5–17)
AST SERPL-CCNC: 18 U/L — SIGNIFICANT CHANGE UP (ref 10–40)
BASOPHILS # BLD AUTO: 0 K/UL — SIGNIFICANT CHANGE UP (ref 0–0.2)
BASOPHILS NFR BLD AUTO: 0 % — SIGNIFICANT CHANGE UP (ref 0–2)
BILIRUB SERPL-MCNC: 0.2 MG/DL — SIGNIFICANT CHANGE UP (ref 0.2–1.2)
BLASTS # FLD: 1 % — HIGH (ref 0–0)
BUN SERPL-MCNC: 16 MG/DL — SIGNIFICANT CHANGE UP (ref 7–23)
CALCIUM SERPL-MCNC: 9.5 MG/DL — SIGNIFICANT CHANGE UP (ref 8.4–10.5)
CHLORIDE SERPL-SCNC: 102 MMOL/L — SIGNIFICANT CHANGE UP (ref 96–108)
CO2 SERPL-SCNC: 25 MMOL/L — SIGNIFICANT CHANGE UP (ref 22–31)
CREAT SERPL-MCNC: 1.09 MG/DL — SIGNIFICANT CHANGE UP (ref 0.5–1.3)
EGFR: 61 ML/MIN/1.73M2 — SIGNIFICANT CHANGE UP
EOSINOPHIL # BLD AUTO: 0 K/UL — SIGNIFICANT CHANGE UP (ref 0–0.5)
EOSINOPHIL NFR BLD AUTO: 0 % — SIGNIFICANT CHANGE UP (ref 0–6)
GLUCOSE SERPL-MCNC: 104 MG/DL — HIGH (ref 70–99)
HCT VFR BLD CALC: 28.1 % — LOW (ref 34.5–45)
HGB BLD-MCNC: 9.7 G/DL — LOW (ref 11.5–15.5)
LYMPHOCYTES # BLD AUTO: 1.39 K/UL — SIGNIFICANT CHANGE UP (ref 1–3.3)
LYMPHOCYTES # BLD AUTO: 9 % — LOW (ref 13–44)
MCHC RBC-ENTMCNC: 31 PG — SIGNIFICANT CHANGE UP (ref 27–34)
MCHC RBC-ENTMCNC: 34.5 G/DL — SIGNIFICANT CHANGE UP (ref 32–36)
MCV RBC AUTO: 89.8 FL — SIGNIFICANT CHANGE UP (ref 80–100)
METAMYELOCYTES # FLD: 3 % — HIGH (ref 0–0)
MONOCYTES # BLD AUTO: 1.7 K/UL — HIGH (ref 0–0.9)
MONOCYTES NFR BLD AUTO: 11 % — SIGNIFICANT CHANGE UP (ref 2–14)
MYELOCYTES NFR BLD: 3 % — HIGH (ref 0–0)
NEUTROPHILS # BLD AUTO: 11.25 K/UL — HIGH (ref 1.8–7.4)
NEUTROPHILS NFR BLD AUTO: 72 % — SIGNIFICANT CHANGE UP (ref 43–77)
NEUTS BAND # BLD: 1 % — SIGNIFICANT CHANGE UP (ref 0–8)
NRBC # BLD: 1 /100 — HIGH (ref 0–0)
NRBC # BLD: SIGNIFICANT CHANGE UP /100 WBCS (ref 0–0)
PLAT MORPH BLD: NORMAL — SIGNIFICANT CHANGE UP
PLATELET # BLD AUTO: 166 K/UL — SIGNIFICANT CHANGE UP (ref 150–400)
POTASSIUM SERPL-MCNC: 3.7 MMOL/L — SIGNIFICANT CHANGE UP (ref 3.5–5.3)
POTASSIUM SERPL-SCNC: 3.7 MMOL/L — SIGNIFICANT CHANGE UP (ref 3.5–5.3)
PROT SERPL-MCNC: 6.6 G/DL — SIGNIFICANT CHANGE UP (ref 6–8.3)
RBC # BLD: 3.13 M/UL — LOW (ref 3.8–5.2)
RBC # FLD: 15.1 % — HIGH (ref 10.3–14.5)
RBC BLD AUTO: SIGNIFICANT CHANGE UP
SODIUM SERPL-SCNC: 140 MMOL/L — SIGNIFICANT CHANGE UP (ref 135–145)
WBC # BLD: 15.41 K/UL — HIGH (ref 3.8–10.5)
WBC # FLD AUTO: 15.41 K/UL — HIGH (ref 3.8–10.5)

## 2023-02-17 PROCEDURE — 99214 OFFICE O/P EST MOD 30 MIN: CPT

## 2023-02-17 NOTE — HISTORY OF PRESENT ILLNESS
[de-identified] : The patient reports having had cosmetic surgery to remove "fat from my axilla," with the patient noting that this was "extra breast tissue."  She had a first procedure in the right axilla approximately 15 years ago with pathology returning negative per patient reporting, and more recently had a similar procedure in her left axilla, "left armpit fat," with the patient also noting that the pathology returned negative.  I do not have a copy of either of these pathology reports.\par \par The patient reports she was not happy with the result, noting that there seemed to be residual tissue in the left axilla.  She kept palpating this area.  She ultimately felt a lump in the left breast, but not near the armpit.  She returned to her plastic surgeon, who palpated the same and noted this was "definitely something else."  Consequently, she was referred for a diagnostic mammogram on 10/10/2022 (with her last prior mammogram approximately 1-2 years earlier).  The right mammogram returned unremarkable; in the upper left breast, middle depth, there was an area of subtle architectural distortion; there was also stable benign-appearing mass in the central left breast at the middle to posterior depth; there were bilateral benign-appearing calcifications.  An ultrasound performed on that same date in the left breast demonstrated left breast 1 o'clock axis 8 centimeters from nipple, likely corresponding to the subtle distortion seen mammographically, there was an 8 x 8 x 8 millimeter irregular hypoechoic mass, which appeared to be associated with some subtle sonographic distortion with ultrasound-guided core biopsy recommended; in the left breast 5 o'clock axis 3 centimeters from nipple, where the patient felt a lump, there was a vague 15 x 6 x 10 millimeter irregular hypoechoic mass; there was no suspicious sonographic findings in the left axilla.  The patient ultimately went on to have an ultrasound-guided core biopsy on 10/14/2022 of the left breast 1 o'clock lesion with a finding of invasive ductal carcinoma, moderately differentiated, estrogen receptor positive, progesterone receptor positive and HER-2/michelle negative; ultrasound-guided biopsy of left breast 5 o'clock lesion showing invasive ductal carcinoma, moderately differentiated, progesterone receptor positive, HER-2/michelle positive.  There was a comment that the 2 lesions were 5.4 centimeters apart, and a pretreatment breast MRI was recommended.  The patient then went on to see Dr. Tierra Samayoa and a bilateral breast MRI was performed on 10/20/2022 with findings of a mass and non-mass enhancement surrounding the biopsy marker in the upper outer breast middle to posterior depth spanning up to 3.5 centimeters x 2.5 centimeters corresponding to the site of newly diagnosed malignancy; there was a mass and non-mass enhancement in the lower outer left breast 5 o'clock position anterior to middle depth, spanning 3.7 x 2.0 centimeters surrounding a biopsy marker; the enhancement was fairly superficial in location, part of which was located in subcutaneous fat without overlying skin enhancement; there was a 6 millimeter focus of irregular, superficial, non-mass enhancement in the lower outer anterior breast located approximately 1.3-1.4 centimeters from nipple within contiguous non-mass enhancement posterior to the index carcinoma at the 5 o'clock position; no axillary right adenopathy was noted; no significant internal mammary adenopathy was noted; there were 2 adjacent prominent level 1 lymph nodes with possible thickened cortex measuring up to 1.5 centimeters in length in the left axilla with no overlying skin thickening or edema in the axilla.  The patient reports being scheduled for a biopsy of the left axillary lymph nodes within the next several days.\par \par The patient is seen today in consultation regarding further treatment recommendations.  She notes a good appetite, stable weight, and excellent performance status.\par \par A review of 10 systems is remarkable for anxiety since her breast cancer diagnosis; she has a history of chronic aches in her feet, hands, and right hip for which an extensive prior workups were negative; she has seen rheumatologist for multiple tests, which all returned negative per patient's report.\par  [de-identified] : She is s/p 2 of TCHP, tolerated fairly well except that on the date of treatment her creat had increased (prev 0.6 to 1.1 on date if treatment) but with the carbo dose based on pre-treatment creatinine. She had follow up BMPs with creat as high as 1.56  in the intertreatment interval. Pt receive IVF wit gradual improvement but not back to baseline. TCHP cycle 2 held / delayed but creat did not recover yet to baseline. Spoke with pt and recommended switching to ACTHP instead - she agreed. \par \par S/p C2 of AC/Onpro on 2/3/22. \par \par She called earlier today c/o fatigue, LAWTON, weakness. Asked to come in urgently. She describes her body is so weak when she tries to do anything that gravity is felt pulling her body down. \par \par

## 2023-02-17 NOTE — PHYSICAL EXAM
[Ambulatory and capable of all self care but unable to carry out any work activities] : Status 2- Ambulatory and capable of all self care but unable to carry out any work activities. Up and about more than 50% of waking hours [Normal] : affect appropriate [de-identified] : conjunctival palllor [de-identified] : weak appearing, pale [de-identified] : Thyromegaly, L>R [de-identified] : hira  [de-identified] : No skin or nipple changes either breast.  Right breast with no discrete palpable masses, no palpable axillary nodes.   Left breast with no discrete palpable masses, no palpable axillary nodes. [de-identified] : generalized pallor

## 2023-02-17 NOTE — HISTORY OF PRESENT ILLNESS
[de-identified] : The patient reports having had cosmetic surgery to remove "fat from my axilla," with the patient noting that this was "extra breast tissue."  She had a first procedure in the right axilla approximately 15 years ago with pathology returning negative per patient reporting, and more recently had a similar procedure in her left axilla, "left armpit fat," with the patient also noting that the pathology returned negative.  I do not have a copy of either of these pathology reports.\par \par The patient reports she was not happy with the result, noting that there seemed to be residual tissue in the left axilla.  She kept palpating this area.  She ultimately felt a lump in the left breast, but not near the armpit.  She returned to her plastic surgeon, who palpated the same and noted this was "definitely something else."  Consequently, she was referred for a diagnostic mammogram on 10/10/2022 (with her last prior mammogram approximately 1-2 years earlier).  The right mammogram returned unremarkable; in the upper left breast, middle depth, there was an area of subtle architectural distortion; there was also stable benign-appearing mass in the central left breast at the middle to posterior depth; there were bilateral benign-appearing calcifications.  An ultrasound performed on that same date in the left breast demonstrated left breast 1 o'clock axis 8 centimeters from nipple, likely corresponding to the subtle distortion seen mammographically, there was an 8 x 8 x 8 millimeter irregular hypoechoic mass, which appeared to be associated with some subtle sonographic distortion with ultrasound-guided core biopsy recommended; in the left breast 5 o'clock axis 3 centimeters from nipple, where the patient felt a lump, there was a vague 15 x 6 x 10 millimeter irregular hypoechoic mass; there was no suspicious sonographic findings in the left axilla.  The patient ultimately went on to have an ultrasound-guided core biopsy on 10/14/2022 of the left breast 1 o'clock lesion with a finding of invasive ductal carcinoma, moderately differentiated, estrogen receptor positive, progesterone receptor positive and HER-2/michelle negative; ultrasound-guided biopsy of left breast 5 o'clock lesion showing invasive ductal carcinoma, moderately differentiated, progesterone receptor positive, HER-2/michelle positive.  There was a comment that the 2 lesions were 5.4 centimeters apart, and a pretreatment breast MRI was recommended.  The patient then went on to see Dr. Tierra Samayoa and a bilateral breast MRI was performed on 10/20/2022 with findings of a mass and non-mass enhancement surrounding the biopsy marker in the upper outer breast middle to posterior depth spanning up to 3.5 centimeters x 2.5 centimeters corresponding to the site of newly diagnosed malignancy; there was a mass and non-mass enhancement in the lower outer left breast 5 o'clock position anterior to middle depth, spanning 3.7 x 2.0 centimeters surrounding a biopsy marker; the enhancement was fairly superficial in location, part of which was located in subcutaneous fat without overlying skin enhancement; there was a 6 millimeter focus of irregular, superficial, non-mass enhancement in the lower outer anterior breast located approximately 1.3-1.4 centimeters from nipple within contiguous non-mass enhancement posterior to the index carcinoma at the 5 o'clock position; no axillary right adenopathy was noted; no significant internal mammary adenopathy was noted; there were 2 adjacent prominent level 1 lymph nodes with possible thickened cortex measuring up to 1.5 centimeters in length in the left axilla with no overlying skin thickening or edema in the axilla.  The patient reports being scheduled for a biopsy of the left axillary lymph nodes within the next several days.\par \par The patient is seen today in consultation regarding further treatment recommendations.  She notes a good appetite, stable weight, and excellent performance status.\par \par A review of 10 systems is remarkable for anxiety since her breast cancer diagnosis; she has a history of chronic aches in her feet, hands, and right hip for which an extensive prior workups were negative; she has seen rheumatologist for multiple tests, which all returned negative per patient's report.\par \par She is s/p 2 of TCHP, tolerated fairly well except that on the date of treatment her creat had increased (prev 0.6 to 1.1 on date if treatment) but with the carbo dose based on pre-treatment creatinine. She had follow up BMPs with creat as high as 1.56  in the intertreatment interval. Pt receive IVF wit gradual improvement but not back to baseline. TCHP cycle 2 held / delayed but creat did not recover yet to baseline. Spoke with pt and recommended switching to ACTHP instead - she agreed.  [de-identified] : S/p C21 of AC.  \par \par Last viost found to have hgb 6.7 - s/p 2upRBC - feels significantly better, like herself.\par \par Here today to start THP.\par \par Denies al other complaints.\par \par Notes a good appetite, stable weight and excellent performance status.

## 2023-02-17 NOTE — PHYSICAL EXAM
[Ambulatory and capable of all self care but unable to carry out any work activities] : Status 2- Ambulatory and capable of all self care but unable to carry out any work activities. Up and about more than 50% of waking hours [Normal] : affect appropriate [de-identified] : weak appearing, pale [de-identified] : conjunctival palllor [de-identified] : Thyromegaly, L>R [de-identified] : hira  [de-identified] : No skin or nipple changes either breast.  Right breast with no discrete palpable masses, no palpable axillary nodes.   Left breast with no discrete palpable masses, no palpable axillary nodes. [de-identified] : generalized pallor

## 2023-02-17 NOTE — PHYSICAL EXAM
[Restricted in physically strenuous activity but ambulatory and able to carry out work of a light or sedentary nature] : Status 1- Restricted in physically strenuous activity but ambulatory and able to carry out work of a light or sedentary nature, e.g., light house work, office work [Normal] : affect appropriate [de-identified] : Thyromegaly, L>R [de-identified] : No skin or nipple changes either breast.  Right breast with no discrete palpable masses, no palpable axillary nodes.   Left breast with no discrete palpable masses, no palpable axillary nodes.

## 2023-02-17 NOTE — REVIEW OF SYSTEMS
[Anxiety] : anxiety [Negative] : Endocrine [FreeTextEntry2] : as above [FreeTextEntry5] : "racing heart"  [FreeTextEntry6] : as above

## 2023-02-17 NOTE — REASON FOR VISIT
[Follow-Up Visit] : a follow-up [Pre-Treatment Visit] : a pre-treatment [Spouse] : spouse [FreeTextEntry2] : Breast Cancer

## 2023-02-17 NOTE — HISTORY OF PRESENT ILLNESS
[de-identified] : The patient reports having had cosmetic surgery to remove "fat from my axilla," with the patient noting that this was "extra breast tissue."  She had a first procedure in the right axilla approximately 15 years ago with pathology returning negative per patient reporting, and more recently had a similar procedure in her left axilla, "left armpit fat," with the patient also noting that the pathology returned negative.  I do not have a copy of either of these pathology reports.\par \par The patient reports she was not happy with the result, noting that there seemed to be residual tissue in the left axilla.  She kept palpating this area.  She ultimately felt a lump in the left breast, but not near the armpit.  She returned to her plastic surgeon, who palpated the same and noted this was "definitely something else."  Consequently, she was referred for a diagnostic mammogram on 10/10/2022 (with her last prior mammogram approximately 1-2 years earlier).  The right mammogram returned unremarkable; in the upper left breast, middle depth, there was an area of subtle architectural distortion; there was also stable benign-appearing mass in the central left breast at the middle to posterior depth; there were bilateral benign-appearing calcifications.  An ultrasound performed on that same date in the left breast demonstrated left breast 1 o'clock axis 8 centimeters from nipple, likely corresponding to the subtle distortion seen mammographically, there was an 8 x 8 x 8 millimeter irregular hypoechoic mass, which appeared to be associated with some subtle sonographic distortion with ultrasound-guided core biopsy recommended; in the left breast 5 o'clock axis 3 centimeters from nipple, where the patient felt a lump, there was a vague 15 x 6 x 10 millimeter irregular hypoechoic mass; there was no suspicious sonographic findings in the left axilla.  The patient ultimately went on to have an ultrasound-guided core biopsy on 10/14/2022 of the left breast 1 o'clock lesion with a finding of invasive ductal carcinoma, moderately differentiated, estrogen receptor positive, progesterone receptor positive and HER-2/michelle negative; ultrasound-guided biopsy of left breast 5 o'clock lesion showing invasive ductal carcinoma, moderately differentiated, progesterone receptor positive, HER-2/michelle positive.  There was a comment that the 2 lesions were 5.4 centimeters apart, and a pretreatment breast MRI was recommended.  The patient then went on to see Dr. Tierra Samayoa and a bilateral breast MRI was performed on 10/20/2022 with findings of a mass and non-mass enhancement surrounding the biopsy marker in the upper outer breast middle to posterior depth spanning up to 3.5 centimeters x 2.5 centimeters corresponding to the site of newly diagnosed malignancy; there was a mass and non-mass enhancement in the lower outer left breast 5 o'clock position anterior to middle depth, spanning 3.7 x 2.0 centimeters surrounding a biopsy marker; the enhancement was fairly superficial in location, part of which was located in subcutaneous fat without overlying skin enhancement; there was a 6 millimeter focus of irregular, superficial, non-mass enhancement in the lower outer anterior breast located approximately 1.3-1.4 centimeters from nipple within contiguous non-mass enhancement posterior to the index carcinoma at the 5 o'clock position; no axillary right adenopathy was noted; no significant internal mammary adenopathy was noted; there were 2 adjacent prominent level 1 lymph nodes with possible thickened cortex measuring up to 1.5 centimeters in length in the left axilla with no overlying skin thickening or edema in the axilla.  The patient reports being scheduled for a biopsy of the left axillary lymph nodes within the next several days.\par \par The patient is seen today in consultation regarding further treatment recommendations.  She notes a good appetite, stable weight, and excellent performance status.\par \par A review of 10 systems is remarkable for anxiety since her breast cancer diagnosis; she has a history of chronic aches in her feet, hands, and right hip for which an extensive prior workups were negative; she has seen rheumatologist for multiple tests, which all returned negative per patient's report.\par  [de-identified] : She is s/p 2 of TCHP, tolerated fairly well except that on the date of treatment her creat had increased (prev 0.6 to 1.1 on date if treatment) but with the carbo dose based on pre-treatment creatinine. She had follow up BMPs with creat as high as 1.56  in the intertreatment interval. Pt receive IVF wit gradual improvement but not back to baseline. TCHP cycle 2 held / delayed but creat did not recover yet to baseline. Spoke with pt and recommended switching to ACTHP instead - she agreed. \par \par S/p C2 of AC/Onpro on 2/3/22. \par \par She called earlier today c/o fatigue, LAWTON, weakness. Asked to come in urgently. She describes her body is so weak when she tries to do anything that gravity is felt pulling her body down. \par \par

## 2023-02-21 DIAGNOSIS — Z51.11 ENCOUNTER FOR ANTINEOPLASTIC CHEMOTHERAPY: ICD-10-CM

## 2023-02-21 RX ORDER — AMLODIPINE BESYLATE 5 MG/1
5 TABLET ORAL
Qty: 30 | Refills: 3 | Status: ACTIVE | COMMUNITY
Start: 2023-01-20 | End: 1900-01-01

## 2023-02-24 ENCOUNTER — RESULT REVIEW (OUTPATIENT)
Age: 53
End: 2023-02-24

## 2023-02-24 ENCOUNTER — APPOINTMENT (OUTPATIENT)
Dept: INFUSION THERAPY | Facility: HOSPITAL | Age: 53
End: 2023-02-24

## 2023-02-24 LAB
BASOPHILS # BLD AUTO: 0.07 K/UL — SIGNIFICANT CHANGE UP (ref 0–0.2)
BASOPHILS NFR BLD AUTO: 1.1 % — SIGNIFICANT CHANGE UP (ref 0–2)
EOSINOPHIL # BLD AUTO: 0.04 K/UL — SIGNIFICANT CHANGE UP (ref 0–0.5)
EOSINOPHIL NFR BLD AUTO: 0.7 % — SIGNIFICANT CHANGE UP (ref 0–6)
HCT VFR BLD CALC: 27.4 % — LOW (ref 34.5–45)
HGB BLD-MCNC: 9.7 G/DL — LOW (ref 11.5–15.5)
IMM GRANULOCYTES NFR BLD AUTO: 1 % — HIGH (ref 0–0.9)
LYMPHOCYTES # BLD AUTO: 0.62 K/UL — LOW (ref 1–3.3)
LYMPHOCYTES # BLD AUTO: 10.1 % — LOW (ref 13–44)
MCHC RBC-ENTMCNC: 31.4 PG — SIGNIFICANT CHANGE UP (ref 27–34)
MCHC RBC-ENTMCNC: 35.4 G/DL — SIGNIFICANT CHANGE UP (ref 32–36)
MCV RBC AUTO: 88.7 FL — SIGNIFICANT CHANGE UP (ref 80–100)
MONOCYTES # BLD AUTO: 1.06 K/UL — HIGH (ref 0–0.9)
MONOCYTES NFR BLD AUTO: 17.3 % — HIGH (ref 2–14)
NEUTROPHILS # BLD AUTO: 4.26 K/UL — SIGNIFICANT CHANGE UP (ref 1.8–7.4)
NEUTROPHILS NFR BLD AUTO: 69.8 % — SIGNIFICANT CHANGE UP (ref 43–77)
NRBC # BLD: 0 /100 WBCS — SIGNIFICANT CHANGE UP (ref 0–0)
PLATELET # BLD AUTO: 305 K/UL — SIGNIFICANT CHANGE UP (ref 150–400)
RBC # BLD: 3.09 M/UL — LOW (ref 3.8–5.2)
RBC # FLD: 14.7 % — HIGH (ref 10.3–14.5)
WBC # BLD: 6.11 K/UL — SIGNIFICANT CHANGE UP (ref 3.8–10.5)
WBC # FLD AUTO: 6.11 K/UL — SIGNIFICANT CHANGE UP (ref 3.8–10.5)

## 2023-02-28 ENCOUNTER — APPOINTMENT (OUTPATIENT)
Dept: HEMATOLOGY ONCOLOGY | Facility: CLINIC | Age: 53
End: 2023-02-28

## 2023-03-02 ENCOUNTER — APPOINTMENT (OUTPATIENT)
Dept: HEMATOLOGY ONCOLOGY | Facility: CLINIC | Age: 53
End: 2023-03-02
Payer: COMMERCIAL

## 2023-03-02 ENCOUNTER — RESULT REVIEW (OUTPATIENT)
Age: 53
End: 2023-03-02

## 2023-03-02 ENCOUNTER — NON-APPOINTMENT (OUTPATIENT)
Age: 53
End: 2023-03-02

## 2023-03-02 VITALS
BODY MASS INDEX: 23.96 KG/M2 | OXYGEN SATURATION: 96 % | WEIGHT: 167.33 LBS | SYSTOLIC BLOOD PRESSURE: 129 MMHG | HEART RATE: 113 BPM | HEIGHT: 70 IN | RESPIRATION RATE: 16 BRPM | DIASTOLIC BLOOD PRESSURE: 86 MMHG | TEMPERATURE: 97.2 F

## 2023-03-02 DIAGNOSIS — R06.00 DYSPNEA, UNSPECIFIED: ICD-10-CM

## 2023-03-02 LAB
BASOPHILS # BLD AUTO: 0.1 K/UL — SIGNIFICANT CHANGE UP (ref 0–0.2)
BASOPHILS NFR BLD AUTO: 1.8 % — SIGNIFICANT CHANGE UP (ref 0–2)
EOSINOPHIL # BLD AUTO: 0.12 K/UL — SIGNIFICANT CHANGE UP (ref 0–0.5)
EOSINOPHIL NFR BLD AUTO: 2.2 % — SIGNIFICANT CHANGE UP (ref 0–6)
HCT VFR BLD CALC: 25 % — LOW (ref 34.5–45)
HGB BLD-MCNC: 9 G/DL — LOW (ref 11.5–15.5)
IMM GRANULOCYTES NFR BLD AUTO: 1.6 % — HIGH (ref 0–0.9)
LYMPHOCYTES # BLD AUTO: 0.62 K/UL — LOW (ref 1–3.3)
LYMPHOCYTES # BLD AUTO: 11.3 % — LOW (ref 13–44)
MCHC RBC-ENTMCNC: 31.9 PG — SIGNIFICANT CHANGE UP (ref 27–34)
MCHC RBC-ENTMCNC: 36 G/DL — SIGNIFICANT CHANGE UP (ref 32–36)
MCV RBC AUTO: 88.7 FL — SIGNIFICANT CHANGE UP (ref 80–100)
MONOCYTES # BLD AUTO: 0.79 K/UL — SIGNIFICANT CHANGE UP (ref 0–0.9)
MONOCYTES NFR BLD AUTO: 14.3 % — HIGH (ref 2–14)
NEUTROPHILS # BLD AUTO: 3.79 K/UL — SIGNIFICANT CHANGE UP (ref 1.8–7.4)
NEUTROPHILS NFR BLD AUTO: 68.8 % — SIGNIFICANT CHANGE UP (ref 43–77)
NRBC # BLD: 0 /100 WBCS — SIGNIFICANT CHANGE UP (ref 0–0)
PLATELET # BLD AUTO: 212 K/UL — SIGNIFICANT CHANGE UP (ref 150–400)
RBC # BLD: 2.82 M/UL — LOW (ref 3.8–5.2)
RBC # FLD: 15 % — HIGH (ref 10.3–14.5)
WBC # BLD: 5.51 K/UL — SIGNIFICANT CHANGE UP (ref 3.8–10.5)
WBC # FLD AUTO: 5.51 K/UL — SIGNIFICANT CHANGE UP (ref 3.8–10.5)

## 2023-03-02 PROCEDURE — 99214 OFFICE O/P EST MOD 30 MIN: CPT

## 2023-03-03 ENCOUNTER — NON-APPOINTMENT (OUTPATIENT)
Age: 53
End: 2023-03-03

## 2023-03-03 ENCOUNTER — RESULT REVIEW (OUTPATIENT)
Age: 53
End: 2023-03-03

## 2023-03-03 ENCOUNTER — APPOINTMENT (OUTPATIENT)
Dept: INFUSION THERAPY | Facility: HOSPITAL | Age: 53
End: 2023-03-03

## 2023-03-03 PROBLEM — R06.00 DOE (DYSPNEA ON EXERTION): Status: ACTIVE | Noted: 2023-03-03

## 2023-03-03 LAB
BASOPHILS # BLD AUTO: 0.09 K/UL — SIGNIFICANT CHANGE UP (ref 0–0.2)
BASOPHILS NFR BLD AUTO: 1.7 % — SIGNIFICANT CHANGE UP (ref 0–2)
EOSINOPHIL # BLD AUTO: 0.14 K/UL — SIGNIFICANT CHANGE UP (ref 0–0.5)
EOSINOPHIL NFR BLD AUTO: 2.6 % — SIGNIFICANT CHANGE UP (ref 0–6)
HCT VFR BLD CALC: 25.5 % — LOW (ref 34.5–45)
HGB BLD-MCNC: 9 G/DL — LOW (ref 11.5–15.5)
IMM GRANULOCYTES NFR BLD AUTO: 1.9 % — HIGH (ref 0–0.9)
LYMPHOCYTES # BLD AUTO: 0.7 K/UL — LOW (ref 1–3.3)
LYMPHOCYTES # BLD AUTO: 13 % — SIGNIFICANT CHANGE UP (ref 13–44)
MCHC RBC-ENTMCNC: 31.3 PG — SIGNIFICANT CHANGE UP (ref 27–34)
MCHC RBC-ENTMCNC: 35.3 G/DL — SIGNIFICANT CHANGE UP (ref 32–36)
MCV RBC AUTO: 88.5 FL — SIGNIFICANT CHANGE UP (ref 80–100)
MONOCYTES # BLD AUTO: 1.1 K/UL — HIGH (ref 0–0.9)
MONOCYTES NFR BLD AUTO: 20.4 % — HIGH (ref 2–14)
NEUTROPHILS # BLD AUTO: 3.27 K/UL — SIGNIFICANT CHANGE UP (ref 1.8–7.4)
NEUTROPHILS NFR BLD AUTO: 60.4 % — SIGNIFICANT CHANGE UP (ref 43–77)
NRBC # BLD: 0 /100 WBCS — SIGNIFICANT CHANGE UP (ref 0–0)
PLATELET # BLD AUTO: 227 K/UL — SIGNIFICANT CHANGE UP (ref 150–400)
RBC # BLD: 2.88 M/UL — LOW (ref 3.8–5.2)
RBC # FLD: 15.2 % — HIGH (ref 10.3–14.5)
WBC # BLD: 5.4 K/UL — SIGNIFICANT CHANGE UP (ref 3.8–10.5)
WBC # FLD AUTO: 5.4 K/UL — SIGNIFICANT CHANGE UP (ref 3.8–10.5)

## 2023-03-03 NOTE — PHYSICAL EXAM
[Restricted in physically strenuous activity but ambulatory and able to carry out work of a light or sedentary nature] : Status 1- Restricted in physically strenuous activity but ambulatory and able to carry out work of a light or sedentary nature, e.g., light house work, office work [Normal] : affect appropriate [de-identified] : Thyromegaly, L>R [de-identified] : Walked patient around office, denied sob, 02 sat remained 97-99%, -130. [de-identified] : tachycardic, ~113.

## 2023-03-03 NOTE — HISTORY OF PRESENT ILLNESS
[de-identified] : The patient reports having had cosmetic surgery to remove "fat from my axilla," with the patient noting that this was "extra breast tissue."  She had a first procedure in the right axilla approximately 15 years ago with pathology returning negative per patient reporting, and more recently had a similar procedure in her left axilla, "left armpit fat," with the patient also noting that the pathology returned negative.  I do not have a copy of either of these pathology reports.\par \par The patient reports she was not happy with the result, noting that there seemed to be residual tissue in the left axilla.  She kept palpating this area.  She ultimately felt a lump in the left breast, but not near the armpit.  She returned to her plastic surgeon, who palpated the same and noted this was "definitely something else."  Consequently, she was referred for a diagnostic mammogram on 10/10/2022 (with her last prior mammogram approximately 1-2 years earlier).  The right mammogram returned unremarkable; in the upper left breast, middle depth, there was an area of subtle architectural distortion; there was also stable benign-appearing mass in the central left breast at the middle to posterior depth; there were bilateral benign-appearing calcifications.  An ultrasound performed on that same date in the left breast demonstrated left breast 1 o'clock axis 8 centimeters from nipple, likely corresponding to the subtle distortion seen mammographically, there was an 8 x 8 x 8 millimeter irregular hypoechoic mass, which appeared to be associated with some subtle sonographic distortion with ultrasound-guided core biopsy recommended; in the left breast 5 o'clock axis 3 centimeters from nipple, where the patient felt a lump, there was a vague 15 x 6 x 10 millimeter irregular hypoechoic mass; there was no suspicious sonographic findings in the left axilla.  The patient ultimately went on to have an ultrasound-guided core biopsy on 10/14/2022 of the left breast 1 o'clock lesion with a finding of invasive ductal carcinoma, moderately differentiated, estrogen receptor positive, progesterone receptor positive and HER-2/michelle negative; ultrasound-guided biopsy of left breast 5 o'clock lesion showing invasive ductal carcinoma, moderately differentiated, progesterone receptor positive, HER-2/michelle positive.  There was a comment that the 2 lesions were 5.4 centimeters apart, and a pretreatment breast MRI was recommended.  The patient then went on to see Dr. Tierra Samayoa and a bilateral breast MRI was performed on 10/20/2022 with findings of a mass and non-mass enhancement surrounding the biopsy marker in the upper outer breast middle to posterior depth spanning up to 3.5 centimeters x 2.5 centimeters corresponding to the site of newly diagnosed malignancy; there was a mass and non-mass enhancement in the lower outer left breast 5 o'clock position anterior to middle depth, spanning 3.7 x 2.0 centimeters surrounding a biopsy marker; the enhancement was fairly superficial in location, part of which was located in subcutaneous fat without overlying skin enhancement; there was a 6 millimeter focus of irregular, superficial, non-mass enhancement in the lower outer anterior breast located approximately 1.3-1.4 centimeters from nipple within contiguous non-mass enhancement posterior to the index carcinoma at the 5 o'clock position; no axillary right adenopathy was noted; no significant internal mammary adenopathy was noted; there were 2 adjacent prominent level 1 lymph nodes with possible thickened cortex measuring up to 1.5 centimeters in length in the left axilla with no overlying skin thickening or edema in the axilla.  The patient reports being scheduled for a biopsy of the left axillary lymph nodes within the next several days.\par \par The patient is seen today in consultation regarding further treatment recommendations.  She notes a good appetite, stable weight, and excellent performance status.\par \par A review of 10 systems is remarkable for anxiety since her breast cancer diagnosis; she has a history of chronic aches in her feet, hands, and right hip for which an extensive prior workups were negative; she has seen rheumatologist for multiple tests, which all returned negative per patient's report.\par \par She is s/p 2 of TCHP, tolerated fairly well except that on the date of treatment her creat had increased (prev 0.6 to 1.1 on date if treatment) but with the carbo dose based on pre-treatment creatinine. She had follow up BMPs with creat as high as 1.56  in the intertreatment interval. Pt receive IVF wit gradual improvement but not back to baseline. TCHP cycle 2 held / delayed but creat did not recover yet to baseline. Spoke with pt and recommended switching to ACTHP instead - she agreed.  [de-identified] : S/p AC 2/2. \par \par S/p C2 THP \par \par Presents today with c/o LAWTON, worsening fatigue, and body aches for past two days.  She states she took COVID swab that was negative.  She states she gets fatigued with sob after doing minimal activities, such as laundry.   She denies any CP, SOB at rest, palpitations, f/c, n/v.\par \par Denies al other complaints.\par \par Notes a good appetite, stable weight and performance status.

## 2023-03-06 NOTE — DISCUSSION/SUMMARY
[FreeTextEntry1] : The visit was provided via telehealth using real-time 2-way audio visual technology. The patient, Crystal Mahoney, was located at home in [place], [place] at the time of the visit. The Genetic Counselor, Remy Lundberg, was located in [place], [place] at the time of the visit. The patient, Crystal Mahoney, and Provider participated in the telehealth encounter. Consent for telehealth services was given on 2023 by the patient, Crystal Mahoney. \par \par REASON FOR CONSULT\par Crystal Mahoney is a 52-year-old female who was referred by Dr. Beto Preston for cancer genetic counseling and risk assessment due to a recent diagnosis of breast cancer and family history of cancer. \par \par RELEVANT MEDICAL HISTORY\par Ms. Mahoney was diagnosed with left breast cancer (IDC, ER+/NH+/HER2-) in 10/2022 at age 52.  She is currently being treated with neoadjuvant chemotherapy. Per patient, she has 10 weeks of therapy left prior to surgery. Genetic testing results were requested to determine whether a lumpectomy or bilateral mastectomy will be pursued. \par \par OTHER MEDICAL AND SURGICAL HISTORY:\par HTN. HLD. GERD. Spinal stenosis. Cosmetic axillary fat removal. Left arm fracture. \par \par PAST OB/GYN HISTORY:\par Obstetrical History: \par Age at Menarche: 12\par Menopausal with LMP at age 52 (chemotherapy-induced)\par Age at First Live Birth: 31\par Oral Contraceptive Use: Yes, 22 years (ages [age] and then 40-50)\par Hormone Replacement Therapy: No\par \par CANCER SCREENING HISTORY:  \par GYN: Last visit 2022, normal. Frequency: every 6 months d/t h/o abnormal pap smears. 	\par Colon: First and last colonoscopy 2 years ago, less than 5 noncancerous polyps reported. \par Skin: Last exam 2 years ago, no biopsies reported. H/o noncancerous lesions. Frequency: yearly. \par \par SOCIAL HISTORY:\par •	Tobacco-product use: No\par \par FAMILY HISTORY:\par Maternal ancestry was reported as [place] and paternal ancestry was reported as [place]. No Ashkenazi Amish heritage reported. A detailed family history of cancer was ascertained, see below and scanned chart for pedigree. \par \par To Ms. Mahoney’s knowledge, no one in the family has had germline testing for cancer susceptibility.  \par 	\par 	RISK ASSESSMENT:\par Ms. Mahoney’s personal history of breast cancer and family history of metastatic melanoma and pancreatic cancer is suggestive of more than one hereditary cancer predisposition syndrome. Given genetic testing will impact her surgical treatment, we recommended genetic testing for a guidelines-based breast and gyn cancers panel, pancreatic cancer panel and melanoma panel. This test analyzes 32 genes: APC, JUSTIN, BAP1, BARD1, BMPR1A, BRCA1, BRCA2, BRIP1, CDH1, CDK4, CDKN2A, CHEK2, EPCAM, MEN1, MITF, MLH1, MSH2, MSH6, NF1, PALB2, PMS2, POT1, PTEN, RAD51C, RAD51D, RB1, SMAD4, STK11, TP53, TSC1, TSC2, VHL.\par \par We discussed the risks, benefits and limitations, and implications of genetic testing. We also discussed the psychosocial implications of genetic testing. Possible test results were reviewed with Ms. Mahoney along with associated medical management options. The Genetic Information Non-discrimination Act (ESTUARDO) was also reviewed.\par \par Ms. Mahoney verbally consented to the above mentioned genetic testing panel. [facility] informed consent form was emailed to the patient. A saliva sample kit was mailed to the patient today. Once the sample has been collected and sent out, Ms. Mahoney will inform us. \par \par PLAN:\par \par 1.	Saliva kit was sent to Ms. Mahoney’s home for collection. Once collected and dropped off, patient will inform us. \par 2.	Ms. Mahoney was sent a copy of the [employer] consent form via email to be filled, signed, and returned to us.\par 3.	We will contact Ms. Mahoney once the results are available and will schedule a follow-up appointment, as needed. Results generally return in 2-3 weeks from the day the sample kit is mailed.\par \par For any additional questions please call Cancer Genetics at [phone]. \par \par \par Remy Lundberg, MS, [facility] [place] Genetic Counselor, Cancer Genetics\par \par \par CC: \par Dr. Beto Preston

## 2023-03-08 ENCOUNTER — RESULT REVIEW (OUTPATIENT)
Age: 53
End: 2023-03-08

## 2023-03-08 ENCOUNTER — APPOINTMENT (OUTPATIENT)
Dept: HEMATOLOGY ONCOLOGY | Facility: CLINIC | Age: 53
End: 2023-03-08
Payer: COMMERCIAL

## 2023-03-08 VITALS
SYSTOLIC BLOOD PRESSURE: 115 MMHG | DIASTOLIC BLOOD PRESSURE: 83 MMHG | HEART RATE: 103 BPM | OXYGEN SATURATION: 99 % | TEMPERATURE: 97 F | WEIGHT: 164.02 LBS | HEIGHT: 70 IN | RESPIRATION RATE: 16 BRPM | BODY MASS INDEX: 23.48 KG/M2

## 2023-03-08 LAB
BASOPHILS # BLD AUTO: 0.06 K/UL — SIGNIFICANT CHANGE UP (ref 0–0.2)
BASOPHILS NFR BLD AUTO: 1.4 % — SIGNIFICANT CHANGE UP (ref 0–2)
EOSINOPHIL # BLD AUTO: 0.24 K/UL — SIGNIFICANT CHANGE UP (ref 0–0.5)
EOSINOPHIL NFR BLD AUTO: 5.7 % — SIGNIFICANT CHANGE UP (ref 0–6)
HCT VFR BLD CALC: 24.3 % — LOW (ref 34.5–45)
HGB BLD-MCNC: 8.6 G/DL — LOW (ref 11.5–15.5)
IMM GRANULOCYTES NFR BLD AUTO: 0.7 % — SIGNIFICANT CHANGE UP (ref 0–0.9)
LYMPHOCYTES # BLD AUTO: 0.71 K/UL — LOW (ref 1–3.3)
LYMPHOCYTES # BLD AUTO: 16.8 % — SIGNIFICANT CHANGE UP (ref 13–44)
MCHC RBC-ENTMCNC: 31.7 PG — SIGNIFICANT CHANGE UP (ref 27–34)
MCHC RBC-ENTMCNC: 35.4 G/DL — SIGNIFICANT CHANGE UP (ref 32–36)
MCV RBC AUTO: 89.7 FL — SIGNIFICANT CHANGE UP (ref 80–100)
MONOCYTES # BLD AUTO: 0.64 K/UL — SIGNIFICANT CHANGE UP (ref 0–0.9)
MONOCYTES NFR BLD AUTO: 15.2 % — HIGH (ref 2–14)
NEUTROPHILS # BLD AUTO: 2.54 K/UL — SIGNIFICANT CHANGE UP (ref 1.8–7.4)
NEUTROPHILS NFR BLD AUTO: 60.2 % — SIGNIFICANT CHANGE UP (ref 43–77)
NRBC # BLD: 0 /100 WBCS — SIGNIFICANT CHANGE UP (ref 0–0)
PLATELET # BLD AUTO: 251 K/UL — SIGNIFICANT CHANGE UP (ref 150–400)
RBC # BLD: 2.71 M/UL — LOW (ref 3.8–5.2)
RBC # FLD: 15.5 % — HIGH (ref 10.3–14.5)
WBC # BLD: 4.22 K/UL — SIGNIFICANT CHANGE UP (ref 3.8–10.5)
WBC # FLD AUTO: 4.22 K/UL — SIGNIFICANT CHANGE UP (ref 3.8–10.5)

## 2023-03-08 PROCEDURE — 99214 OFFICE O/P EST MOD 30 MIN: CPT

## 2023-03-08 NOTE — PHYSICAL EXAM
[Restricted in physically strenuous activity but ambulatory and able to carry out work of a light or sedentary nature] : Status 1- Restricted in physically strenuous activity but ambulatory and able to carry out work of a light or sedentary nature, e.g., light house work, office work [Normal] : affect appropriate [de-identified] : Thyromegaly, L>R [de-identified] : No skin or nipple changes either breast. Right breast with no discrete palpable masses, no palpable axillary nodes. Left breast with no discrete palpable masses, no palpable axillary nodes. \par

## 2023-03-08 NOTE — HISTORY OF PRESENT ILLNESS
[de-identified] : The patient reports having had cosmetic surgery to remove "fat from my axilla," with the patient noting that this was "extra breast tissue."  She had a first procedure in the right axilla approximately 15 years ago with pathology returning negative per patient reporting, and more recently had a similar procedure in her left axilla, "left armpit fat," with the patient also noting that the pathology returned negative.  I do not have a copy of either of these pathology reports.\par \par The patient reports she was not happy with the result, noting that there seemed to be residual tissue in the left axilla.  She kept palpating this area.  She ultimately felt a lump in the left breast, but not near the armpit.  She returned to her plastic surgeon, who palpated the same and noted this was "definitely something else."  Consequently, she was referred for a diagnostic mammogram on 10/10/2022 (with her last prior mammogram approximately 1-2 years earlier).  The right mammogram returned unremarkable; in the upper left breast, middle depth, there was an area of subtle architectural distortion; there was also stable benign-appearing mass in the central left breast at the middle to posterior depth; there were bilateral benign-appearing calcifications.  An ultrasound performed on that same date in the left breast demonstrated left breast 1 o'clock axis 8 centimeters from nipple, likely corresponding to the subtle distortion seen mammographically, there was an 8 x 8 x 8 millimeter irregular hypoechoic mass, which appeared to be associated with some subtle sonographic distortion with ultrasound-guided core biopsy recommended; in the left breast 5 o'clock axis 3 centimeters from nipple, where the patient felt a lump, there was a vague 15 x 6 x 10 millimeter irregular hypoechoic mass; there was no suspicious sonographic findings in the left axilla.  The patient ultimately went on to have an ultrasound-guided core biopsy on 10/14/2022 of the left breast 1 o'clock lesion with a finding of invasive ductal carcinoma, moderately differentiated, estrogen receptor positive, progesterone receptor positive and HER-2/michelle negative; ultrasound-guided biopsy of left breast 5 o'clock lesion showing invasive ductal carcinoma, moderately differentiated, progesterone receptor positive, HER-2/michelle positive.  There was a comment that the 2 lesions were 5.4 centimeters apart, and a pretreatment breast MRI was recommended.  The patient then went on to see Dr. Tierra Samayoa and a bilateral breast MRI was performed on 10/20/2022 with findings of a mass and non-mass enhancement surrounding the biopsy marker in the upper outer breast middle to posterior depth spanning up to 3.5 centimeters x 2.5 centimeters corresponding to the site of newly diagnosed malignancy; there was a mass and non-mass enhancement in the lower outer left breast 5 o'clock position anterior to middle depth, spanning 3.7 x 2.0 centimeters surrounding a biopsy marker; the enhancement was fairly superficial in location, part of which was located in subcutaneous fat without overlying skin enhancement; there was a 6 millimeter focus of irregular, superficial, non-mass enhancement in the lower outer anterior breast located approximately 1.3-1.4 centimeters from nipple within contiguous non-mass enhancement posterior to the index carcinoma at the 5 o'clock position; no axillary right adenopathy was noted; no significant internal mammary adenopathy was noted; there were 2 adjacent prominent level 1 lymph nodes with possible thickened cortex measuring up to 1.5 centimeters in length in the left axilla with no overlying skin thickening or edema in the axilla.  The patient reports being scheduled for a biopsy of the left axillary lymph nodes within the next several days.\par \par The patient is seen today in consultation regarding further treatment recommendations.  She notes a good appetite, stable weight, and excellent performance status.\par \par A review of 10 systems is remarkable for anxiety since her breast cancer diagnosis; she has a history of chronic aches in her feet, hands, and right hip for which an extensive prior workups were negative; she has seen rheumatologist for multiple tests, which all returned negative per patient's report.\par \par She is s/p 2 of TCHP, tolerated fairly well except that on the date of treatment her creat had increased (prev 0.6 to 1.1 on date if treatment) but with the carbo dose based on pre-treatment creatinine. She had follow up BMPs with creat as high as 1.56  in the intertreatment interval. Pt receive IVF wit gradual improvement but not back to baseline. TCHP cycle 2 held / delayed but creat did not recover yet to baseline. Spoke with pt and recommended switching to ACTHP instead - she agreed.  [de-identified] : The patient presents for follow up.  She is s/p TC 2/2 12/16/22, treatment regimen switched to ACTHP secondary to decreased renal function.  She is s/p AC 2/2 2/3/23. \par \par The patient presents for follow up.\par \par S/p C2 THP..   \par \par States first couple of days after treatment she feels well and has good energy.  She states she then develops fatigue day 3-5 days after treatment.  Also c/o body aches day 2-6 after treatment, takes tylenol as needed.  \par \par She states LAWTON and increased HR, stable, no change.  She saw her cardiologist and states she was told all is well.  She will have echo in 4/2023.\par \par States appetite is decreased.  She states she is eating three meals a day, however not eating as much as prior to treatment.  \par \par Denies al other complaints.\par \par Notes an okay appetite, stable weight and performance status.

## 2023-03-10 ENCOUNTER — APPOINTMENT (OUTPATIENT)
Dept: INFUSION THERAPY | Facility: HOSPITAL | Age: 53
End: 2023-03-10

## 2023-03-17 ENCOUNTER — RESULT REVIEW (OUTPATIENT)
Age: 53
End: 2023-03-17

## 2023-03-17 ENCOUNTER — APPOINTMENT (OUTPATIENT)
Dept: HEMATOLOGY ONCOLOGY | Facility: CLINIC | Age: 53
End: 2023-03-17

## 2023-03-17 ENCOUNTER — APPOINTMENT (OUTPATIENT)
Dept: INFUSION THERAPY | Facility: HOSPITAL | Age: 53
End: 2023-03-17

## 2023-03-17 LAB
BASOPHILS # BLD AUTO: 0.08 K/UL — SIGNIFICANT CHANGE UP (ref 0–0.2)
BASOPHILS NFR BLD AUTO: 1.3 % — SIGNIFICANT CHANGE UP (ref 0–2)
EOSINOPHIL # BLD AUTO: 0.42 K/UL — SIGNIFICANT CHANGE UP (ref 0–0.5)
EOSINOPHIL NFR BLD AUTO: 6.8 % — HIGH (ref 0–6)
HCT VFR BLD CALC: 24 % — LOW (ref 34.5–45)
HGB BLD-MCNC: 8.5 G/DL — LOW (ref 11.5–15.5)
IMM GRANULOCYTES NFR BLD AUTO: 0.5 % — SIGNIFICANT CHANGE UP (ref 0–0.9)
LYMPHOCYTES # BLD AUTO: 1.21 K/UL — SIGNIFICANT CHANGE UP (ref 1–3.3)
LYMPHOCYTES # BLD AUTO: 19.6 % — SIGNIFICANT CHANGE UP (ref 13–44)
MCHC RBC-ENTMCNC: 32.8 PG — SIGNIFICANT CHANGE UP (ref 27–34)
MCHC RBC-ENTMCNC: 35.4 G/DL — SIGNIFICANT CHANGE UP (ref 32–36)
MCV RBC AUTO: 92.7 FL — SIGNIFICANT CHANGE UP (ref 80–100)
MONOCYTES # BLD AUTO: 0.79 K/UL — SIGNIFICANT CHANGE UP (ref 0–0.9)
MONOCYTES NFR BLD AUTO: 12.8 % — SIGNIFICANT CHANGE UP (ref 2–14)
NEUTROPHILS # BLD AUTO: 3.63 K/UL — SIGNIFICANT CHANGE UP (ref 1.8–7.4)
NEUTROPHILS NFR BLD AUTO: 59 % — SIGNIFICANT CHANGE UP (ref 43–77)
NRBC # BLD: 0 /100 WBCS — SIGNIFICANT CHANGE UP (ref 0–0)
PLATELET # BLD AUTO: 269 K/UL — SIGNIFICANT CHANGE UP (ref 150–400)
RBC # BLD: 2.59 M/UL — LOW (ref 3.8–5.2)
RBC # FLD: 15.5 % — HIGH (ref 10.3–14.5)
WBC # BLD: 6.16 K/UL — SIGNIFICANT CHANGE UP (ref 3.8–10.5)
WBC # FLD AUTO: 6.16 K/UL — SIGNIFICANT CHANGE UP (ref 3.8–10.5)

## 2023-03-24 ENCOUNTER — RESULT REVIEW (OUTPATIENT)
Age: 53
End: 2023-03-24

## 2023-03-24 ENCOUNTER — NON-APPOINTMENT (OUTPATIENT)
Age: 53
End: 2023-03-24

## 2023-03-24 ENCOUNTER — APPOINTMENT (OUTPATIENT)
Dept: INFUSION THERAPY | Facility: HOSPITAL | Age: 53
End: 2023-03-24

## 2023-03-24 ENCOUNTER — APPOINTMENT (OUTPATIENT)
Dept: HEMATOLOGY ONCOLOGY | Facility: CLINIC | Age: 53
End: 2023-03-24
Payer: COMMERCIAL

## 2023-03-24 LAB
ALBUMIN SERPL ELPH-MCNC: 4.2 G/DL — SIGNIFICANT CHANGE UP (ref 3.3–5)
ALP SERPL-CCNC: 95 U/L — SIGNIFICANT CHANGE UP (ref 40–120)
ALT FLD-CCNC: 32 U/L — SIGNIFICANT CHANGE UP (ref 10–45)
ANION GAP SERPL CALC-SCNC: 14 MMOL/L — SIGNIFICANT CHANGE UP (ref 5–17)
AST SERPL-CCNC: 26 U/L — SIGNIFICANT CHANGE UP (ref 10–40)
BASOPHILS # BLD AUTO: 0.04 K/UL — SIGNIFICANT CHANGE UP (ref 0–0.2)
BASOPHILS NFR BLD AUTO: 0.9 % — SIGNIFICANT CHANGE UP (ref 0–2)
BILIRUB SERPL-MCNC: 0.2 MG/DL — SIGNIFICANT CHANGE UP (ref 0.2–1.2)
BUN SERPL-MCNC: 21 MG/DL — SIGNIFICANT CHANGE UP (ref 7–23)
CALCIUM SERPL-MCNC: 9.7 MG/DL — SIGNIFICANT CHANGE UP (ref 8.4–10.5)
CHLORIDE SERPL-SCNC: 103 MMOL/L — SIGNIFICANT CHANGE UP (ref 96–108)
CO2 SERPL-SCNC: 23 MMOL/L — SIGNIFICANT CHANGE UP (ref 22–31)
CREAT SERPL-MCNC: 1.01 MG/DL — SIGNIFICANT CHANGE UP (ref 0.5–1.3)
EGFR: 67 ML/MIN/1.73M2 — SIGNIFICANT CHANGE UP
EOSINOPHIL # BLD AUTO: 0.2 K/UL — SIGNIFICANT CHANGE UP (ref 0–0.5)
EOSINOPHIL NFR BLD AUTO: 4.6 % — SIGNIFICANT CHANGE UP (ref 0–6)
GLUCOSE SERPL-MCNC: 125 MG/DL — HIGH (ref 70–99)
HCT VFR BLD CALC: 25.2 % — LOW (ref 34.5–45)
HGB BLD-MCNC: 8.8 G/DL — LOW (ref 11.5–15.5)
IMM GRANULOCYTES NFR BLD AUTO: 0.7 % — SIGNIFICANT CHANGE UP (ref 0–0.9)
LYMPHOCYTES # BLD AUTO: 1.18 K/UL — SIGNIFICANT CHANGE UP (ref 1–3.3)
LYMPHOCYTES # BLD AUTO: 27.1 % — SIGNIFICANT CHANGE UP (ref 13–44)
MCHC RBC-ENTMCNC: 32.7 PG — SIGNIFICANT CHANGE UP (ref 27–34)
MCHC RBC-ENTMCNC: 34.9 G/DL — SIGNIFICANT CHANGE UP (ref 32–36)
MCV RBC AUTO: 93.7 FL — SIGNIFICANT CHANGE UP (ref 80–100)
MONOCYTES # BLD AUTO: 0.5 K/UL — SIGNIFICANT CHANGE UP (ref 0–0.9)
MONOCYTES NFR BLD AUTO: 11.5 % — SIGNIFICANT CHANGE UP (ref 2–14)
NEUTROPHILS # BLD AUTO: 2.4 K/UL — SIGNIFICANT CHANGE UP (ref 1.8–7.4)
NEUTROPHILS NFR BLD AUTO: 55.2 % — SIGNIFICANT CHANGE UP (ref 43–77)
NRBC # BLD: 0 /100 WBCS — SIGNIFICANT CHANGE UP (ref 0–0)
PLATELET # BLD AUTO: 242 K/UL — SIGNIFICANT CHANGE UP (ref 150–400)
POTASSIUM SERPL-MCNC: 3.6 MMOL/L — SIGNIFICANT CHANGE UP (ref 3.5–5.3)
POTASSIUM SERPL-SCNC: 3.6 MMOL/L — SIGNIFICANT CHANGE UP (ref 3.5–5.3)
PROT SERPL-MCNC: 6.5 G/DL — SIGNIFICANT CHANGE UP (ref 6–8.3)
RBC # BLD: 2.69 M/UL — LOW (ref 3.8–5.2)
RBC # FLD: 16.1 % — HIGH (ref 10.3–14.5)
SODIUM SERPL-SCNC: 141 MMOL/L — SIGNIFICANT CHANGE UP (ref 135–145)
WBC # BLD: 4.35 K/UL — SIGNIFICANT CHANGE UP (ref 3.8–10.5)
WBC # FLD AUTO: 4.35 K/UL — SIGNIFICANT CHANGE UP (ref 3.8–10.5)

## 2023-03-24 PROCEDURE — 99214 OFFICE O/P EST MOD 30 MIN: CPT

## 2023-03-31 ENCOUNTER — NON-APPOINTMENT (OUTPATIENT)
Age: 53
End: 2023-03-31

## 2023-03-31 ENCOUNTER — RESULT REVIEW (OUTPATIENT)
Age: 53
End: 2023-03-31

## 2023-03-31 ENCOUNTER — APPOINTMENT (OUTPATIENT)
Dept: HEMATOLOGY ONCOLOGY | Facility: CLINIC | Age: 53
End: 2023-03-31
Payer: COMMERCIAL

## 2023-03-31 ENCOUNTER — LABORATORY RESULT (OUTPATIENT)
Age: 53
End: 2023-03-31

## 2023-03-31 ENCOUNTER — APPOINTMENT (OUTPATIENT)
Dept: HEMATOLOGY ONCOLOGY | Facility: CLINIC | Age: 53
End: 2023-03-31

## 2023-03-31 ENCOUNTER — APPOINTMENT (OUTPATIENT)
Dept: INFUSION THERAPY | Facility: HOSPITAL | Age: 53
End: 2023-03-31

## 2023-03-31 VITALS
RESPIRATION RATE: 16 BRPM | DIASTOLIC BLOOD PRESSURE: 85 MMHG | OXYGEN SATURATION: 99 % | WEIGHT: 167.55 LBS | TEMPERATURE: 98 F | HEART RATE: 84 BPM | HEIGHT: 70 IN | BODY MASS INDEX: 23.99 KG/M2 | SYSTOLIC BLOOD PRESSURE: 120 MMHG

## 2023-03-31 LAB
BASOPHILS # BLD AUTO: 0.05 K/UL — SIGNIFICANT CHANGE UP (ref 0–0.2)
BASOPHILS NFR BLD AUTO: 1.2 % — SIGNIFICANT CHANGE UP (ref 0–2)
EOSINOPHIL # BLD AUTO: 0.17 K/UL — SIGNIFICANT CHANGE UP (ref 0–0.5)
EOSINOPHIL NFR BLD AUTO: 4 % — SIGNIFICANT CHANGE UP (ref 0–6)
HCT VFR BLD CALC: 24.6 % — LOW (ref 34.5–45)
HGB BLD-MCNC: 8.5 G/DL — LOW (ref 11.5–15.5)
IMM GRANULOCYTES NFR BLD AUTO: 0.5 % — SIGNIFICANT CHANGE UP (ref 0–0.9)
LYMPHOCYTES # BLD AUTO: 1.01 K/UL — SIGNIFICANT CHANGE UP (ref 1–3.3)
LYMPHOCYTES # BLD AUTO: 24 % — SIGNIFICANT CHANGE UP (ref 13–44)
MCHC RBC-ENTMCNC: 33.5 PG — SIGNIFICANT CHANGE UP (ref 27–34)
MCHC RBC-ENTMCNC: 34.6 G/DL — SIGNIFICANT CHANGE UP (ref 32–36)
MCV RBC AUTO: 96.9 FL — SIGNIFICANT CHANGE UP (ref 80–100)
MONOCYTES # BLD AUTO: 0.53 K/UL — SIGNIFICANT CHANGE UP (ref 0–0.9)
MONOCYTES NFR BLD AUTO: 12.6 % — SIGNIFICANT CHANGE UP (ref 2–14)
NEUTROPHILS # BLD AUTO: 2.42 K/UL — SIGNIFICANT CHANGE UP (ref 1.8–7.4)
NEUTROPHILS NFR BLD AUTO: 57.7 % — SIGNIFICANT CHANGE UP (ref 43–77)
NRBC # BLD: 0 /100 WBCS — SIGNIFICANT CHANGE UP (ref 0–0)
PLATELET # BLD AUTO: 251 K/UL — SIGNIFICANT CHANGE UP (ref 150–400)
RBC # BLD: 2.54 M/UL — LOW (ref 3.8–5.2)
RBC # FLD: 16.6 % — HIGH (ref 10.3–14.5)
WBC # BLD: 4.2 K/UL — SIGNIFICANT CHANGE UP (ref 3.8–10.5)
WBC # FLD AUTO: 4.2 K/UL — SIGNIFICANT CHANGE UP (ref 3.8–10.5)

## 2023-03-31 PROCEDURE — 99214 OFFICE O/P EST MOD 30 MIN: CPT

## 2023-03-31 NOTE — HISTORY OF PRESENT ILLNESS
[de-identified] : The patient reports having had cosmetic surgery to remove "fat from my axilla," with the patient noting that this was "extra breast tissue."  She had a first procedure in the right axilla approximately 15 years ago with pathology returning negative per patient reporting, and more recently had a similar procedure in her left axilla, "left armpit fat," with the patient also noting that the pathology returned negative.  I do not have a copy of either of these pathology reports.\par \par The patient reports she was not happy with the result, noting that there seemed to be residual tissue in the left axilla.  She kept palpating this area.  She ultimately felt a lump in the left breast, but not near the armpit.  She returned to her plastic surgeon, who palpated the same and noted this was "definitely something else."  Consequently, she was referred for a diagnostic mammogram on 10/10/2022 (with her last prior mammogram approximately 1-2 years earlier).  The right mammogram returned unremarkable; in the upper left breast, middle depth, there was an area of subtle architectural distortion; there was also stable benign-appearing mass in the central left breast at the middle to posterior depth; there were bilateral benign-appearing calcifications.  An ultrasound performed on that same date in the left breast demonstrated left breast 1 o'clock axis 8 centimeters from nipple, likely corresponding to the subtle distortion seen mammographically, there was an 8 x 8 x 8 millimeter irregular hypoechoic mass, which appeared to be associated with some subtle sonographic distortion with ultrasound-guided core biopsy recommended; in the left breast 5 o'clock axis 3 centimeters from nipple, where the patient felt a lump, there was a vague 15 x 6 x 10 millimeter irregular hypoechoic mass; there was no suspicious sonographic findings in the left axilla.  The patient ultimately went on to have an ultrasound-guided core biopsy on 10/14/2022 of the left breast 1 o'clock lesion with a finding of invasive ductal carcinoma, moderately differentiated, estrogen receptor positive, progesterone receptor positive and HER-2/michelle negative; ultrasound-guided biopsy of left breast 5 o'clock lesion showing invasive ductal carcinoma, moderately differentiated, progesterone receptor positive, HER-2/michelle positive.  There was a comment that the 2 lesions were 5.4 centimeters apart, and a pretreatment breast MRI was recommended.  The patient then went on to see Dr. Tierra Samayoa and a bilateral breast MRI was performed on 10/20/2022 with findings of a mass and non-mass enhancement surrounding the biopsy marker in the upper outer breast middle to posterior depth spanning up to 3.5 centimeters x 2.5 centimeters corresponding to the site of newly diagnosed malignancy; there was a mass and non-mass enhancement in the lower outer left breast 5 o'clock position anterior to middle depth, spanning 3.7 x 2.0 centimeters surrounding a biopsy marker; the enhancement was fairly superficial in location, part of which was located in subcutaneous fat without overlying skin enhancement; there was a 6 millimeter focus of irregular, superficial, non-mass enhancement in the lower outer anterior breast located approximately 1.3-1.4 centimeters from nipple within contiguous non-mass enhancement posterior to the index carcinoma at the 5 o'clock position; no axillary right adenopathy was noted; no significant internal mammary adenopathy was noted; there were 2 adjacent prominent level 1 lymph nodes with possible thickened cortex measuring up to 1.5 centimeters in length in the left axilla with no overlying skin thickening or edema in the axilla.  The patient reports being scheduled for a biopsy of the left axillary lymph nodes within the next several days.\par \par The patient is seen today in consultation regarding further treatment recommendations.  She notes a good appetite, stable weight, and excellent performance status.\par \par A review of 10 systems is remarkable for anxiety since her breast cancer diagnosis; she has a history of chronic aches in her feet, hands, and right hip for which an extensive prior workups were negative; she has seen rheumatologist for multiple tests, which all returned negative per patient's report.\par \par She is s/p 2 of TCHP, tolerated fairly well except that on the date of treatment her creat had increased (prev 0.6 to 1.1 on date if treatment) but with the carbo dose based on pre-treatment creatinine. She had follow up BMPs with creat as high as 1.56  in the intertreatment interval. Pt receive IVF wit gradual improvement but not back to baseline. TCHP cycle 2 held / delayed but creat did not recover yet to baseline. Spoke with pt and recommended switching to ACTHP instead - she agreed.  [de-identified] : The patient presents for follow up.  She is s/p TC 2/2 12/16/22, treatment regimen switched to ACTHP secondary to decreased renal function.  She is s/p AC 2/2 2/3/23. \par \par The patient presents for follow up.\par \par For T6 today   \par \par After last treatment, minimal tingling or toes, not fingers. \par \par Feel overall better and better. More energy.\par \par Denies any other treatment related side effects.  \par \par States appetite is decreased.  She states she is eating three meals a day, however not eating as much as prior to treatment.  \par \par Denies all other complaints.\par \par  Echo 1/32/23 LVEF 55-60%

## 2023-03-31 NOTE — PHYSICAL EXAM
[Restricted in physically strenuous activity but ambulatory and able to carry out work of a light or sedentary nature] : Status 1- Restricted in physically strenuous activity but ambulatory and able to carry out work of a light or sedentary nature, e.g., light house work, office work [Normal] : affect appropriate [de-identified] : Thyromegaly, L>R [de-identified] : No skin or nipple changes either breast. Right breast with no discrete palpable masses, no palpable axillary nodes. Left breast with no discrete palpable masses, no palpable axillary nodes. \par

## 2023-04-03 ENCOUNTER — NON-APPOINTMENT (OUTPATIENT)
Age: 53
End: 2023-04-03

## 2023-04-03 ENCOUNTER — APPOINTMENT (OUTPATIENT)
Dept: SURGERY | Facility: CLINIC | Age: 53
End: 2023-04-03
Payer: COMMERCIAL

## 2023-04-03 PROCEDURE — 99215K: CUSTOM

## 2023-04-04 NOTE — HISTORY OF PRESENT ILLNESS
[de-identified] : Patient here for C6 Taxol today. Pt reports Rash on her forehead and cheek that started 3 days ago. Reports her face initially felt very "tight' she then noticed a non-pruritic pinkish rash on left cheek and across her forehead. She tried hydrocortisone cream without improvement. She then applied Cetaphil Cream and avoided hot showers , which have help the rash. States the rash is not currently bothering her and it is not getting worse. On Exam: +Scattered blanchable non-raised small pinkish lesion on forehead and left cheek. Non-tender to palpation. Instructed patient to continue with Cetaphil Cream , if rash worsens call clinic for further instructions. Patient to f/u with primary team on 3/31. Primary team made aware.

## 2023-04-07 ENCOUNTER — RESULT REVIEW (OUTPATIENT)
Age: 53
End: 2023-04-07

## 2023-04-07 ENCOUNTER — APPOINTMENT (OUTPATIENT)
Dept: INFUSION THERAPY | Facility: HOSPITAL | Age: 53
End: 2023-04-07

## 2023-04-07 ENCOUNTER — APPOINTMENT (OUTPATIENT)
Dept: HEMATOLOGY ONCOLOGY | Facility: CLINIC | Age: 53
End: 2023-04-07

## 2023-04-07 LAB
BASOPHILS # BLD AUTO: 0.06 K/UL — SIGNIFICANT CHANGE UP (ref 0–0.2)
BASOPHILS NFR BLD AUTO: 1.4 % — SIGNIFICANT CHANGE UP (ref 0–2)
EOSINOPHIL # BLD AUTO: 0.13 K/UL — SIGNIFICANT CHANGE UP (ref 0–0.5)
EOSINOPHIL NFR BLD AUTO: 3 % — SIGNIFICANT CHANGE UP (ref 0–6)
HCT VFR BLD CALC: 26.6 % — LOW (ref 34.5–45)
HGB BLD-MCNC: 9.2 G/DL — LOW (ref 11.5–15.5)
IMM GRANULOCYTES NFR BLD AUTO: 0.9 % — SIGNIFICANT CHANGE UP (ref 0–0.9)
LYMPHOCYTES # BLD AUTO: 1.03 K/UL — SIGNIFICANT CHANGE UP (ref 1–3.3)
LYMPHOCYTES # BLD AUTO: 23.4 % — SIGNIFICANT CHANGE UP (ref 13–44)
MCHC RBC-ENTMCNC: 33 PG — SIGNIFICANT CHANGE UP (ref 27–34)
MCHC RBC-ENTMCNC: 34.6 G/DL — SIGNIFICANT CHANGE UP (ref 32–36)
MCV RBC AUTO: 95.3 FL — SIGNIFICANT CHANGE UP (ref 80–100)
MONOCYTES # BLD AUTO: 0.49 K/UL — SIGNIFICANT CHANGE UP (ref 0–0.9)
MONOCYTES NFR BLD AUTO: 11.1 % — SIGNIFICANT CHANGE UP (ref 2–14)
NEUTROPHILS # BLD AUTO: 2.65 K/UL — SIGNIFICANT CHANGE UP (ref 1.8–7.4)
NEUTROPHILS NFR BLD AUTO: 60.2 % — SIGNIFICANT CHANGE UP (ref 43–77)
NRBC # BLD: 0 /100 WBCS — SIGNIFICANT CHANGE UP (ref 0–0)
PLATELET # BLD AUTO: 252 K/UL — SIGNIFICANT CHANGE UP (ref 150–400)
RBC # BLD: 2.79 M/UL — LOW (ref 3.8–5.2)
RBC # FLD: 16.1 % — HIGH (ref 10.3–14.5)
WBC # BLD: 4.4 K/UL — SIGNIFICANT CHANGE UP (ref 3.8–10.5)
WBC # FLD AUTO: 4.4 K/UL — SIGNIFICANT CHANGE UP (ref 3.8–10.5)

## 2023-04-11 ENCOUNTER — OUTPATIENT (OUTPATIENT)
Dept: OUTPATIENT SERVICES | Facility: HOSPITAL | Age: 53
LOS: 1 days | Discharge: HOME | End: 2023-04-11

## 2023-04-11 DIAGNOSIS — C50.919 MALIGNANT NEOPLASM OF UNSPECIFIED SITE OF UNSPECIFIED FEMALE BREAST: ICD-10-CM

## 2023-04-14 ENCOUNTER — RESULT REVIEW (OUTPATIENT)
Age: 53
End: 2023-04-14

## 2023-04-14 ENCOUNTER — APPOINTMENT (OUTPATIENT)
Dept: HEMATOLOGY ONCOLOGY | Facility: CLINIC | Age: 53
End: 2023-04-14

## 2023-04-14 ENCOUNTER — APPOINTMENT (OUTPATIENT)
Dept: INFUSION THERAPY | Facility: HOSPITAL | Age: 53
End: 2023-04-14

## 2023-04-14 LAB
BASOPHILS # BLD AUTO: 0.06 K/UL — SIGNIFICANT CHANGE UP (ref 0–0.2)
BASOPHILS NFR BLD AUTO: 1.3 % — SIGNIFICANT CHANGE UP (ref 0–2)
EOSINOPHIL # BLD AUTO: 0.11 K/UL — SIGNIFICANT CHANGE UP (ref 0–0.5)
EOSINOPHIL NFR BLD AUTO: 2.4 % — SIGNIFICANT CHANGE UP (ref 0–6)
HCT VFR BLD CALC: 25.8 % — LOW (ref 34.5–45)
HGB BLD-MCNC: 9.2 G/DL — LOW (ref 11.5–15.5)
IMM GRANULOCYTES NFR BLD AUTO: 1.7 % — HIGH (ref 0–0.9)
LYMPHOCYTES # BLD AUTO: 1.15 K/UL — SIGNIFICANT CHANGE UP (ref 1–3.3)
LYMPHOCYTES # BLD AUTO: 24.7 % — SIGNIFICANT CHANGE UP (ref 13–44)
MCHC RBC-ENTMCNC: 33.7 PG — SIGNIFICANT CHANGE UP (ref 27–34)
MCHC RBC-ENTMCNC: 35.7 G/DL — SIGNIFICANT CHANGE UP (ref 32–36)
MCV RBC AUTO: 94.5 FL — SIGNIFICANT CHANGE UP (ref 80–100)
MONOCYTES # BLD AUTO: 0.52 K/UL — SIGNIFICANT CHANGE UP (ref 0–0.9)
MONOCYTES NFR BLD AUTO: 11.2 % — SIGNIFICANT CHANGE UP (ref 2–14)
NEUTROPHILS # BLD AUTO: 2.73 K/UL — SIGNIFICANT CHANGE UP (ref 1.8–7.4)
NEUTROPHILS NFR BLD AUTO: 58.7 % — SIGNIFICANT CHANGE UP (ref 43–77)
NRBC # BLD: 0 /100 WBCS — SIGNIFICANT CHANGE UP (ref 0–0)
PLATELET # BLD AUTO: 241 K/UL — SIGNIFICANT CHANGE UP (ref 150–400)
RBC # BLD: 2.73 M/UL — LOW (ref 3.8–5.2)
RBC # FLD: 15.9 % — HIGH (ref 10.3–14.5)
WBC # BLD: 4.65 K/UL — SIGNIFICANT CHANGE UP (ref 3.8–10.5)
WBC # FLD AUTO: 4.65 K/UL — SIGNIFICANT CHANGE UP (ref 3.8–10.5)

## 2023-04-17 DIAGNOSIS — R11.2 NAUSEA WITH VOMITING, UNSPECIFIED: ICD-10-CM

## 2023-04-17 DIAGNOSIS — Z51.11 ENCOUNTER FOR ANTINEOPLASTIC CHEMOTHERAPY: ICD-10-CM

## 2023-04-21 ENCOUNTER — APPOINTMENT (OUTPATIENT)
Dept: HEMATOLOGY ONCOLOGY | Facility: CLINIC | Age: 53
End: 2023-04-21

## 2023-04-21 ENCOUNTER — APPOINTMENT (OUTPATIENT)
Dept: INFUSION THERAPY | Facility: HOSPITAL | Age: 53
End: 2023-04-21

## 2023-04-21 ENCOUNTER — APPOINTMENT (OUTPATIENT)
Dept: HEMATOLOGY ONCOLOGY | Facility: CLINIC | Age: 53
End: 2023-04-21
Payer: COMMERCIAL

## 2023-04-21 ENCOUNTER — RESULT REVIEW (OUTPATIENT)
Age: 53
End: 2023-04-21

## 2023-04-21 VITALS
BODY MASS INDEX: 24.24 KG/M2 | RESPIRATION RATE: 16 BRPM | OXYGEN SATURATION: 98 % | DIASTOLIC BLOOD PRESSURE: 95 MMHG | WEIGHT: 169.32 LBS | HEART RATE: 98 BPM | TEMPERATURE: 97.1 F | HEIGHT: 70 IN | SYSTOLIC BLOOD PRESSURE: 140 MMHG

## 2023-04-21 LAB
BASOPHILS # BLD AUTO: 0.04 K/UL — SIGNIFICANT CHANGE UP (ref 0–0.2)
BASOPHILS NFR BLD AUTO: 1 % — SIGNIFICANT CHANGE UP (ref 0–2)
EOSINOPHIL # BLD AUTO: 0.11 K/UL — SIGNIFICANT CHANGE UP (ref 0–0.5)
EOSINOPHIL NFR BLD AUTO: 2.7 % — SIGNIFICANT CHANGE UP (ref 0–6)
HCT VFR BLD CALC: 27.8 % — LOW (ref 34.5–45)
HGB BLD-MCNC: 9.7 G/DL — LOW (ref 11.5–15.5)
IMM GRANULOCYTES NFR BLD AUTO: 1 % — HIGH (ref 0–0.9)
LYMPHOCYTES # BLD AUTO: 1.05 K/UL — SIGNIFICANT CHANGE UP (ref 1–3.3)
LYMPHOCYTES # BLD AUTO: 25.9 % — SIGNIFICANT CHANGE UP (ref 13–44)
MCHC RBC-ENTMCNC: 33.4 PG — SIGNIFICANT CHANGE UP (ref 27–34)
MCHC RBC-ENTMCNC: 34.9 G/DL — SIGNIFICANT CHANGE UP (ref 32–36)
MCV RBC AUTO: 95.9 FL — SIGNIFICANT CHANGE UP (ref 80–100)
MONOCYTES # BLD AUTO: 0.46 K/UL — SIGNIFICANT CHANGE UP (ref 0–0.9)
MONOCYTES NFR BLD AUTO: 11.3 % — SIGNIFICANT CHANGE UP (ref 2–14)
NEUTROPHILS # BLD AUTO: 2.36 K/UL — SIGNIFICANT CHANGE UP (ref 1.8–7.4)
NEUTROPHILS NFR BLD AUTO: 58.1 % — SIGNIFICANT CHANGE UP (ref 43–77)
NRBC # BLD: 0 /100 WBCS — SIGNIFICANT CHANGE UP (ref 0–0)
PLATELET # BLD AUTO: 240 K/UL — SIGNIFICANT CHANGE UP (ref 150–400)
RBC # BLD: 2.9 M/UL — LOW (ref 3.8–5.2)
RBC # FLD: 15.2 % — HIGH (ref 10.3–14.5)
WBC # BLD: 4.06 K/UL — SIGNIFICANT CHANGE UP (ref 3.8–10.5)
WBC # FLD AUTO: 4.06 K/UL — SIGNIFICANT CHANGE UP (ref 3.8–10.5)

## 2023-04-21 PROCEDURE — 99214 OFFICE O/P EST MOD 30 MIN: CPT

## 2023-04-21 NOTE — PHYSICAL EXAM
[Restricted in physically strenuous activity but ambulatory and able to carry out work of a light or sedentary nature] : Status 1- Restricted in physically strenuous activity but ambulatory and able to carry out work of a light or sedentary nature, e.g., light house work, office work [Normal] : affect appropriate [de-identified] : Thyromegaly, L>R [de-identified] : No edema noted. [de-identified] : No skin or nipple changes either breast. Right breast with no discrete palpable masses, no palpable axillary nodes. Left breast with no discrete palpable masses, no palpable axillary nodes. \par

## 2023-04-21 NOTE — HISTORY OF PRESENT ILLNESS
[de-identified] : The patient reports having had cosmetic surgery to remove "fat from my axilla," with the patient noting that this was "extra breast tissue."  She had a first procedure in the right axilla approximately 15 years ago with pathology returning negative per patient reporting, and more recently had a similar procedure in her left axilla, "left armpit fat," with the patient also noting that the pathology returned negative.  I do not have a copy of either of these pathology reports.\par \par The patient reports she was not happy with the result, noting that there seemed to be residual tissue in the left axilla.  She kept palpating this area.  She ultimately felt a lump in the left breast, but not near the armpit.  She returned to her plastic surgeon, who palpated the same and noted this was "definitely something else."  Consequently, she was referred for a diagnostic mammogram on 10/10/2022 (with her last prior mammogram approximately 1-2 years earlier).  The right mammogram returned unremarkable; in the upper left breast, middle depth, there was an area of subtle architectural distortion; there was also stable benign-appearing mass in the central left breast at the middle to posterior depth; there were bilateral benign-appearing calcifications.  An ultrasound performed on that same date in the left breast demonstrated left breast 1 o'clock axis 8 centimeters from nipple, likely corresponding to the subtle distortion seen mammographically, there was an 8 x 8 x 8 millimeter irregular hypoechoic mass, which appeared to be associated with some subtle sonographic distortion with ultrasound-guided core biopsy recommended; in the left breast 5 o'clock axis 3 centimeters from nipple, where the patient felt a lump, there was a vague 15 x 6 x 10 millimeter irregular hypoechoic mass; there was no suspicious sonographic findings in the left axilla.  The patient ultimately went on to have an ultrasound-guided core biopsy on 10/14/2022 of the left breast 1 o'clock lesion with a finding of invasive ductal carcinoma, moderately differentiated, estrogen receptor positive, progesterone receptor positive and HER-2/michelle negative; ultrasound-guided biopsy of left breast 5 o'clock lesion showing invasive ductal carcinoma, moderately differentiated, progesterone receptor positive, HER-2/michelle positive.  There was a comment that the 2 lesions were 5.4 centimeters apart, and a pretreatment breast MRI was recommended.  The patient then went on to see Dr. Tierra Samayoa and a bilateral breast MRI was performed on 10/20/2022 with findings of a mass and non-mass enhancement surrounding the biopsy marker in the upper outer breast middle to posterior depth spanning up to 3.5 centimeters x 2.5 centimeters corresponding to the site of newly diagnosed malignancy; there was a mass and non-mass enhancement in the lower outer left breast 5 o'clock position anterior to middle depth, spanning 3.7 x 2.0 centimeters surrounding a biopsy marker; the enhancement was fairly superficial in location, part of which was located in subcutaneous fat without overlying skin enhancement; there was a 6 millimeter focus of irregular, superficial, non-mass enhancement in the lower outer anterior breast located approximately 1.3-1.4 centimeters from nipple within contiguous non-mass enhancement posterior to the index carcinoma at the 5 o'clock position; no axillary right adenopathy was noted; no significant internal mammary adenopathy was noted; there were 2 adjacent prominent level 1 lymph nodes with possible thickened cortex measuring up to 1.5 centimeters in length in the left axilla with no overlying skin thickening or edema in the axilla.  The patient reports being scheduled for a biopsy of the left axillary lymph nodes within the next several days.\par \par The patient is seen today in consultation regarding further treatment recommendations.  She notes a good appetite, stable weight, and excellent performance status.\par \par A review of 10 systems is remarkable for anxiety since her breast cancer diagnosis; she has a history of chronic aches in her feet, hands, and right hip for which an extensive prior workups were negative; she has seen rheumatologist for multiple tests, which all returned negative per patient's report.\par \par She is s/p 2 of TCHP, tolerated fairly well except that on the date of treatment her creat had increased (prev 0.6 to 1.1 on date if treatment) but with the carbo dose based on pre-treatment creatinine. She had follow up BMPs with creat as high as 1.56  in the intertreatment interval. Pt receive IVF wit gradual improvement but not back to baseline. TCHP cycle 2 held / delayed but creat did not recover yet to baseline. Spoke with pt and recommended switching to ACTHP instead - she agreed.  [de-identified] : The patient presents for follow up.  She is s/p TC 2/2 12/16/22, treatment regimen switched to ACTHP secondary to decreased renal function.  She is s/p AC 2/2 2/3/23. \par \par The patient presents for follow up.\par \par For T10 today   \par \par Complains of minimal tingling in tips of fingers and toes.  States also noticed a patch of tingling/numbness on left ankle.  Denies any pain or erythema.\par  \par Denies any other treatment related side effects.  \par \par She states she went on road trip and was sitting for long periods of times over weekend and noticed bilateral LE swelling.  She elevated her legs and swelling resolved.  She denies any redness or pain.  Denies any current swelling. \par \par Continues to have acne-like rash on face that tends to flare up 2-3 days after treatment.  She is scheduled to see the dermatologist in two weeks. \par  \par States appetite is decreased.  She states she is eating three meals a day, however not eating as much as prior to treatment.  \par \par Denies all other complaints.\par \par  Echo 1/31/23 LVEF 55-60%

## 2023-04-23 ENCOUNTER — FORM ENCOUNTER (OUTPATIENT)
Age: 53
End: 2023-04-23

## 2023-04-25 ENCOUNTER — OUTPATIENT (OUTPATIENT)
Dept: OUTPATIENT SERVICES | Facility: HOSPITAL | Age: 53
LOS: 1 days | End: 2023-04-25
Payer: COMMERCIAL

## 2023-04-25 ENCOUNTER — APPOINTMENT (OUTPATIENT)
Dept: MRI IMAGING | Facility: CLINIC | Age: 53
End: 2023-04-25
Payer: COMMERCIAL

## 2023-04-25 DIAGNOSIS — Z00.8 ENCOUNTER FOR OTHER GENERAL EXAMINATION: ICD-10-CM

## 2023-04-25 PROCEDURE — A9585: CPT

## 2023-04-25 PROCEDURE — C8937: CPT

## 2023-04-25 PROCEDURE — 77049 MRI BREAST C-+ W/CAD BI: CPT | Mod: 26

## 2023-04-25 PROCEDURE — C8908: CPT

## 2023-04-28 ENCOUNTER — RESULT REVIEW (OUTPATIENT)
Age: 53
End: 2023-04-28

## 2023-04-28 ENCOUNTER — APPOINTMENT (OUTPATIENT)
Dept: INFUSION THERAPY | Facility: HOSPITAL | Age: 53
End: 2023-04-28

## 2023-04-28 ENCOUNTER — APPOINTMENT (OUTPATIENT)
Dept: HEMATOLOGY ONCOLOGY | Facility: CLINIC | Age: 53
End: 2023-04-28

## 2023-04-28 LAB
BASOPHILS # BLD AUTO: 0.05 K/UL — SIGNIFICANT CHANGE UP (ref 0–0.2)
BASOPHILS NFR BLD AUTO: 1.2 % — SIGNIFICANT CHANGE UP (ref 0–2)
EOSINOPHIL # BLD AUTO: 0.08 K/UL — SIGNIFICANT CHANGE UP (ref 0–0.5)
EOSINOPHIL NFR BLD AUTO: 1.9 % — SIGNIFICANT CHANGE UP (ref 0–6)
HCT VFR BLD CALC: 28.1 % — LOW (ref 34.5–45)
HGB BLD-MCNC: 9.8 G/DL — LOW (ref 11.5–15.5)
IMM GRANULOCYTES NFR BLD AUTO: 0.5 % — SIGNIFICANT CHANGE UP (ref 0–0.9)
LYMPHOCYTES # BLD AUTO: 1.21 K/UL — SIGNIFICANT CHANGE UP (ref 1–3.3)
LYMPHOCYTES # BLD AUTO: 28.3 % — SIGNIFICANT CHANGE UP (ref 13–44)
MCHC RBC-ENTMCNC: 33.3 PG — SIGNIFICANT CHANGE UP (ref 27–34)
MCHC RBC-ENTMCNC: 34.9 G/DL — SIGNIFICANT CHANGE UP (ref 32–36)
MCV RBC AUTO: 95.6 FL — SIGNIFICANT CHANGE UP (ref 80–100)
MONOCYTES # BLD AUTO: 0.47 K/UL — SIGNIFICANT CHANGE UP (ref 0–0.9)
MONOCYTES NFR BLD AUTO: 11 % — SIGNIFICANT CHANGE UP (ref 2–14)
NEUTROPHILS # BLD AUTO: 2.44 K/UL — SIGNIFICANT CHANGE UP (ref 1.8–7.4)
NEUTROPHILS NFR BLD AUTO: 57.1 % — SIGNIFICANT CHANGE UP (ref 43–77)
NRBC # BLD: 0 /100 WBCS — SIGNIFICANT CHANGE UP (ref 0–0)
PLATELET # BLD AUTO: 273 K/UL — SIGNIFICANT CHANGE UP (ref 150–400)
RBC # BLD: 2.94 M/UL — LOW (ref 3.8–5.2)
RBC # FLD: 15.1 % — HIGH (ref 10.3–14.5)
WBC # BLD: 4.27 K/UL — SIGNIFICANT CHANGE UP (ref 3.8–10.5)
WBC # FLD AUTO: 4.27 K/UL — SIGNIFICANT CHANGE UP (ref 3.8–10.5)

## 2023-05-02 ENCOUNTER — NON-APPOINTMENT (OUTPATIENT)
Age: 53
End: 2023-05-02

## 2023-05-02 ENCOUNTER — APPOINTMENT (OUTPATIENT)
Dept: DERMATOLOGY | Facility: CLINIC | Age: 53
End: 2023-05-02
Payer: COMMERCIAL

## 2023-05-02 DIAGNOSIS — L71.9 ROSACEA, UNSPECIFIED: ICD-10-CM

## 2023-05-02 DIAGNOSIS — L30.9 DERMATITIS, UNSPECIFIED: ICD-10-CM

## 2023-05-02 PROCEDURE — 99204 OFFICE O/P NEW MOD 45 MIN: CPT

## 2023-05-04 NOTE — HISTORY OF PRESENT ILLNESS
[FreeTextEntry1] : NPA- rash on the face [de-identified] : 52 yo F presenting today for evaluation of a rash on her face. Medical history significant for invasive ductal carcinoma, estrogen receptor positive, progesterone receptor positive, HER-2/michelle positive. She is s/p 11 treatments with Taxol and around 7 treatments of Herceptin/Pertuzumab. \par Rash on the face started ~5-6 weeks ago. Started a couple of days after Taxol, "burning" pain, sometimes itchy. Has tried OTC hydrocortisone without relief. Improves after a few days. \par Different from the steroid flush that she previously experienced with her premedications include dexamethasone, famotidine, Benadryl. \par LN testing pending at lumpectomy scheduled for June 6th.  \par

## 2023-05-04 NOTE — PHYSICAL EXAM
[FreeTextEntry3] : well-differentiated broad, scaly, pink, erythematous papules coalescing into a round, minimally elevated plaque on the central forehead\par \par centrofacial erythema with telangiectasias\par \par hands and feet are clear

## 2023-05-04 NOTE — ASSESSMENT
[FreeTextEntry1] : 1. 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 Rosacea, Chronic\par Diagnosis reviewed.\par Tx options discussed.\par CTCAE Grade 1\par PLAN\par - START Triamcinolone 0.01% oint apply to affected area on the forehead once a day at NIGHT for 2 weeks on, 1 week off. SED. \par - START Metronidazole 0.75% cream, apply and gently massage into the face twice daily. SED. \par - START Pimecrolimus 1% cream, apply to affected areas on the face once a day in the MORNING. SED. \par - Counseled Patient on the importance of photoprotection, encouraged at least SPF 30 daily even indoors. \par \par RTC 1 month. \par

## 2023-05-05 ENCOUNTER — APPOINTMENT (OUTPATIENT)
Dept: INFUSION THERAPY | Facility: HOSPITAL | Age: 53
End: 2023-05-05

## 2023-05-05 ENCOUNTER — APPOINTMENT (OUTPATIENT)
Dept: HEMATOLOGY ONCOLOGY | Facility: CLINIC | Age: 53
End: 2023-05-05

## 2023-05-10 NOTE — DISCUSSION/SUMMARY
[FreeTextEntry1] : Ms. 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 Our scheduling team will reach out to her to set up a collaborative visit with myself and Dr. Jaspal Ceballos to review the implications for her and her family members. \par \par Of note, these genetic testing results are not complete. Due to a partial sample failure large deletions/duplication (CNV) was not completed. Ms. Mahoney had her blood drawn today at [facility], [facility]. Once those results are available, we will review them with the patient. The partial report noting the CHEK2 mutation was sent for scanning. \par \par eRmy Lundberg MS, [facility] Genetic Counselor \par \par \par 04/24/2023\par Patient informed the full genetic testing report had no additional findings, only the pathogenic mutation previously identified in the CHEK2 gene. She is scheduled for her follow-up visit on 05/16/2023. Report was sent for scanning. \par \par Remy Lundberg MS, [facility] Genetic Counselor

## 2023-05-12 ENCOUNTER — APPOINTMENT (OUTPATIENT)
Dept: INFUSION THERAPY | Facility: HOSPITAL | Age: 53
End: 2023-05-12

## 2023-05-12 ENCOUNTER — APPOINTMENT (OUTPATIENT)
Dept: HEMATOLOGY ONCOLOGY | Facility: CLINIC | Age: 53
End: 2023-05-12
Payer: COMMERCIAL

## 2023-05-12 VITALS
WEIGHT: 173.28 LBS | RESPIRATION RATE: 16 BRPM | OXYGEN SATURATION: 98 % | HEART RATE: 88 BPM | TEMPERATURE: 96.6 F | DIASTOLIC BLOOD PRESSURE: 94 MMHG | SYSTOLIC BLOOD PRESSURE: 145 MMHG | BODY MASS INDEX: 24.86 KG/M2

## 2023-05-12 PROCEDURE — 99214 OFFICE O/P EST MOD 30 MIN: CPT

## 2023-05-12 NOTE — PHYSICAL EXAM
[Restricted in physically strenuous activity but ambulatory and able to carry out work of a light or sedentary nature] : Status 1- Restricted in physically strenuous activity but ambulatory and able to carry out work of a light or sedentary nature, e.g., light house work, office work [Normal] : affect appropriate [de-identified] : Thyromegaly, L>R [de-identified] : No skin or nipple changes either breast. Right breast with no discrete palpable masses, no palpable axillary nodes. Left breast with no discrete palpable masses, no palpable axillary nodes. \par

## 2023-05-12 NOTE — HISTORY OF PRESENT ILLNESS
[de-identified] : The patient reports having had cosmetic surgery to remove "fat from my axilla," with the patient noting that this was "extra breast tissue."  She had a first procedure in the right axilla approximately 15 years ago with pathology returning negative per patient reporting, and more recently had a similar procedure in her left axilla, "left armpit fat," with the patient also noting that the pathology returned negative.  I do not have a copy of either of these pathology reports.\par \par The patient reports she was not happy with the result, noting that there seemed to be residual tissue in the left axilla.  She kept palpating this area.  She ultimately felt a lump in the left breast, but not near the armpit.  She returned to her plastic surgeon, who palpated the same and noted this was "definitely something else."  Consequently, she was referred for a diagnostic mammogram on 10/10/2022 (with her last prior mammogram approximately 1-2 years earlier).  The right mammogram returned unremarkable; in the upper left breast, middle depth, there was an area of subtle architectural distortion; there was also stable benign-appearing mass in the central left breast at the middle to posterior depth; there were bilateral benign-appearing calcifications.  An ultrasound performed on that same date in the left breast demonstrated left breast 1 o'clock axis 8 centimeters from nipple, likely corresponding to the subtle distortion seen mammographically, there was an 8 x 8 x 8 millimeter irregular hypoechoic mass, which appeared to be associated with some subtle sonographic distortion with ultrasound-guided core biopsy recommended; in the left breast 5 o'clock axis 3 centimeters from nipple, where the patient felt a lump, there was a vague 15 x 6 x 10 millimeter irregular hypoechoic mass; there was no suspicious sonographic findings in the left axilla.  The patient ultimately went on to have an ultrasound-guided core biopsy on 10/14/2022 of the left breast 1 o'clock lesion with a finding of invasive ductal carcinoma, moderately differentiated, estrogen receptor positive, progesterone receptor positive and HER-2/michelle negative; ultrasound-guided biopsy of left breast 5 o'clock lesion showing invasive ductal carcinoma, moderately differentiated, progesterone receptor positive, HER-2/michelle positive.  There was a comment that the 2 lesions were 5.4 centimeters apart, and a pretreatment breast MRI was recommended.  The patient then went on to see Dr. T[place] Samayoa and a bilateral breast MRI was performed on 10/20/2022 with findings of a mass and non-mass enhancement surrounding the biopsy marker in the upper outer breast middle to posterior depth spanning up to 3.5 centimeters x 2.5 centimeters corresponding to the site of newly diagnosed malignancy; there was a mass and non-mass enhancement in the lower outer left breast 5 o'clock position anterior to middle depth, spanning 3.7 x 2.0 centimeters surrounding a biopsy marker; the enhancement was fairly superficial in location, part of which was located in subcutaneous fat without overlying skin enhancement; there was a 6 millimeter focus of irregular, superficial, non-mass enhancement in the lower outer anterior breast located approximately 1.3-1.4 centimeters from nipple within contiguous non-mass enhancement posterior to the index carcinoma at the 5 o'clock position; no axillary right adenopathy was noted; no significant internal mammary adenopathy was noted; there were 2 adjacent prominent level 1 lymph nodes with possible thickened cortex measuring up to 1.5 centimeters in length in the left axilla with no overlying skin thickening or edema in the axilla.  The patient reports being scheduled for a biopsy of the left axillary lymph nodes within the next several days.\par \par The patient is seen today in consultation regarding further treatment recommendations.  She notes a good appetite, stable weight, and excellent performance status.\par \par A review of 10 systems is remarkable for anxiety since her breast cancer diagnosis; she has a history of chronic aches in her feet, hands, and right hip for which an extensive prior workups were negative; she has seen rheumatologist for multiple tests, which all returned negative per patient's report.\par \par She is s/p 2 of TCHP, tolerated fairly well except that on the date of treatment her creat had increased (prev 0.6 to 1.1 on date if treatment) but with the carbo dose based on pre-treatment creatinine. She had follow up BMPs with creat as high as 1.56  in the intertreatment interval. Pt receive IVF wit gradual improvement but not back to baseline. TCHP cycle 2 held / delayed but creat did not recover yet to baseline. Spoke with pt and recommended switching to ACTHP instead - she agreed.  [de-identified] : The patient presents for follow up.  She is s/p TC 2/2 12/16/22, treatment regimen switched to ACTHP secondary to decreased renal function.  She is s/p AC 2/2 2/3/23. \par \par The patient presents for follow up.\par \par S/p taxol 11/12.  C12 held because of worsening neuropathy. \par \par She states pain in feet has improved.  She is still experiencing numbness in feet/ankle and tips of fingers.  States difficulty clasping jewelry.  Denies dropping things or tripping/falling. \par  \par Denies any other treatment related side effects.  \par \par Continues to have acne-like rash on face that tends to flare up 2-3 days after treatment. Saw derm and was given creams to use with some improvement.  \par \par She states she lost her eyelashes and noticed that her eye lids are slightly swollen.  She denies any pain or redness. \par  \par States appetite is decreased.  She states she is eating three meals a day, however not eating as much as prior to treatment.  \par \par Denies all other complaints.\par \par Breast MRI, 4/25/23- IMPRESSION:  No measurable enhancing lesion associated with a biopsy clip in the left upper outer breast corresponding to the biopsy-proven site of malignancy described in the 1:00 location which measured 3.5 cm on the prior exam.\par Significantly decreased area of residual nonspecific mild nonmass enhancement with interspersed foci in the left lower outer breast measuring 1.8 cm on the current study, corresponding to the site of biopsy-proven malignancy labeled in the 5:00 location which measured 3.7 cm on the prior exam, consistent with partial to near complete imaging response to neoadjuvant therapy.\par Previously noted 6 mm superficial enhancing focus in the anterior left lateral breast is no longer visualized.\par Adjacent level 1 left axillary lymph nodes are decreased in size as described above, previously noted to measure 1.5 cm in largest dimension. No right axillary or internal mammary lymphadenopathy is appreciated.\par No suspicious enhancing findings in the right breast.\par \par Echo, 4/24/23- LVEF 55-60%\par \par Echo 1/31/23 LVEF 55-60%

## 2023-05-12 NOTE — REASON FOR VISIT
[Follow-Up Visit] : a follow-up [Pre-Treatment Visit] : a pre-treatment [Urgent Visit] : an urgent  [Spouse] : spouse [FreeTextEntry2] : Breast Cancer

## 2023-05-16 ENCOUNTER — APPOINTMENT (OUTPATIENT)
Dept: HEMATOLOGY ONCOLOGY | Facility: CLINIC | Age: 53
End: 2023-05-16
Payer: COMMERCIAL

## 2023-05-16 PROCEDURE — 99203 OFFICE O/P NEW LOW 30 MIN: CPT | Mod: 95

## 2023-05-25 ENCOUNTER — OUTPATIENT (OUTPATIENT)
Dept: OUTPATIENT SERVICES | Facility: HOSPITAL | Age: 53
LOS: 1 days | End: 2023-05-25
Payer: COMMERCIAL

## 2023-05-25 ENCOUNTER — APPOINTMENT (OUTPATIENT)
Dept: MAMMOGRAPHY | Facility: IMAGING CENTER | Age: 53
End: 2023-05-25
Payer: COMMERCIAL

## 2023-05-25 VITALS
SYSTOLIC BLOOD PRESSURE: 110 MMHG | WEIGHT: 169.98 LBS | DIASTOLIC BLOOD PRESSURE: 80 MMHG | OXYGEN SATURATION: 98 % | TEMPERATURE: 97 F | HEART RATE: 72 BPM | RESPIRATION RATE: 16 BRPM | HEIGHT: 70 IN

## 2023-05-25 DIAGNOSIS — C50.412 MALIGNANT NEOPLASM OF UPPER-OUTER QUADRANT OF LEFT FEMALE BREAST: ICD-10-CM

## 2023-05-25 DIAGNOSIS — I10 ESSENTIAL (PRIMARY) HYPERTENSION: ICD-10-CM

## 2023-05-25 DIAGNOSIS — Z98.890 OTHER SPECIFIED POSTPROCEDURAL STATES: Chronic | ICD-10-CM

## 2023-05-25 DIAGNOSIS — C50.512 MALIGNANT NEOPLASM OF LOWER-OUTER QUADRANT OF LEFT FEMALE BREAST: ICD-10-CM

## 2023-05-25 DIAGNOSIS — Z00.8 ENCOUNTER FOR OTHER GENERAL EXAMINATION: ICD-10-CM

## 2023-05-25 LAB
ALBUMIN SERPL ELPH-MCNC: 4.5 G/DL — SIGNIFICANT CHANGE UP (ref 3.3–5)
ALP SERPL-CCNC: 82 U/L — SIGNIFICANT CHANGE UP (ref 40–120)
ALT FLD-CCNC: 27 U/L — SIGNIFICANT CHANGE UP (ref 4–33)
ANION GAP SERPL CALC-SCNC: 13 MMOL/L — SIGNIFICANT CHANGE UP (ref 7–14)
AST SERPL-CCNC: 29 U/L — SIGNIFICANT CHANGE UP (ref 4–32)
BILIRUB SERPL-MCNC: 0.3 MG/DL — SIGNIFICANT CHANGE UP (ref 0.2–1.2)
BUN SERPL-MCNC: 24 MG/DL — HIGH (ref 7–23)
CALCIUM SERPL-MCNC: 9.7 MG/DL — SIGNIFICANT CHANGE UP (ref 8.4–10.5)
CHLORIDE SERPL-SCNC: 101 MMOL/L — SIGNIFICANT CHANGE UP (ref 98–107)
CO2 SERPL-SCNC: 25 MMOL/L — SIGNIFICANT CHANGE UP (ref 22–31)
CREAT SERPL-MCNC: 1.09 MG/DL — SIGNIFICANT CHANGE UP (ref 0.5–1.3)
EGFR: 60 ML/MIN/1.73M2 — SIGNIFICANT CHANGE UP
GLUCOSE SERPL-MCNC: 106 MG/DL — HIGH (ref 70–99)
HCG SERPL-ACNC: <1 MIU/ML — SIGNIFICANT CHANGE UP
HCT VFR BLD CALC: 30.2 % — LOW (ref 34.5–45)
HGB BLD-MCNC: 10.3 G/DL — LOW (ref 11.5–15.5)
MCHC RBC-ENTMCNC: 31.8 PG — SIGNIFICANT CHANGE UP (ref 27–34)
MCHC RBC-ENTMCNC: 34.1 GM/DL — SIGNIFICANT CHANGE UP (ref 32–36)
MCV RBC AUTO: 93.2 FL — SIGNIFICANT CHANGE UP (ref 80–100)
NRBC # BLD: 0 /100 WBCS — SIGNIFICANT CHANGE UP (ref 0–0)
NRBC # FLD: 0 K/UL — SIGNIFICANT CHANGE UP (ref 0–0)
PLATELET # BLD AUTO: 250 K/UL — SIGNIFICANT CHANGE UP (ref 150–400)
POTASSIUM SERPL-MCNC: 3.8 MMOL/L — SIGNIFICANT CHANGE UP (ref 3.5–5.3)
POTASSIUM SERPL-SCNC: 3.8 MMOL/L — SIGNIFICANT CHANGE UP (ref 3.5–5.3)
PROT SERPL-MCNC: 7.3 G/DL — SIGNIFICANT CHANGE UP (ref 6–8.3)
RBC # BLD: 3.24 M/UL — LOW (ref 3.8–5.2)
RBC # FLD: 12.9 % — SIGNIFICANT CHANGE UP (ref 10.3–14.5)
SODIUM SERPL-SCNC: 139 MMOL/L — SIGNIFICANT CHANGE UP (ref 135–145)
WBC # BLD: 4.95 K/UL — SIGNIFICANT CHANGE UP (ref 3.8–10.5)
WBC # FLD AUTO: 4.95 K/UL — SIGNIFICANT CHANGE UP (ref 3.8–10.5)

## 2023-05-25 PROCEDURE — 19282 PERQ DEVICE BREAST EA IMAG: CPT | Mod: LT

## 2023-05-25 PROCEDURE — A4648: CPT

## 2023-05-25 PROCEDURE — 19281 PERQ DEVICE BREAST 1ST IMAG: CPT | Mod: LT

## 2023-05-25 PROCEDURE — 19282 PERQ DEVICE BREAST EA IMAG: CPT

## 2023-05-25 PROCEDURE — 93010 ELECTROCARDIOGRAM REPORT: CPT

## 2023-05-25 PROCEDURE — 19281 PERQ DEVICE BREAST 1ST IMAG: CPT

## 2023-05-25 RX ORDER — CHOLECALCIFEROL (VITAMIN D3) 125 MCG
2000 CAPSULE ORAL
Qty: 0 | Refills: 0 | DISCHARGE

## 2023-05-25 RX ORDER — SODIUM CHLORIDE 9 MG/ML
1000 INJECTION, SOLUTION INTRAVENOUS
Refills: 0 | Status: DISCONTINUED | OUTPATIENT
Start: 2023-06-06 | End: 2023-06-20

## 2023-05-25 NOTE — H&P PST ADULT - NS MD HP INPLANTS MED DEV
right mediport  x 1  ,Markers - bilateral breast/Vascular access device right chest mediport  x 1  ,Markers - bilateral breast/Vascular access device

## 2023-05-25 NOTE — H&P PST ADULT - CARDIOVASCULAR
details… regular rate and rhythm/S1 S2 present/no JVD/vascular right chest wall mediport./regular rate and rhythm/S1 S2 present/no JVD/vascular

## 2023-05-25 NOTE — H&P PST ADULT - ASSESSMENT
malignant neoplasm of lower outer quadrant of left female breast  Malignant neoplasm of lower outer quadrant of left female breast

## 2023-05-25 NOTE — H&P PST ADULT - HEMATOLOGY/LYMPHATICS COMMENTS
hx of breast ca - had 15 rounds of chemotherapy . Last chemo 04/28/23 . Pt also gets every 3 weeks for an year - Herceptin /Projeta - monoclonal antibody for left breast ca tx - next dose jun2 2nd 23 hx of breast ca - had 15 rounds of chemotherapy . Last chemo 04/28/23 . Pt also gets every 3 weeks for an year - Herceptin /Projeta - monoclonal antibody for left breast ca tx - next dose jun 2nd 23

## 2023-05-25 NOTE — H&P PST ADULT - NSICDXPASTMEDICALHX_GEN_ALL_CORE_FT
PAST MEDICAL HISTORY:  GERD (gastroesophageal reflux disease)     Hypertension     Primary malignant neoplasm of lower outer quadrant of left female breast     Spinal stenosis     Thyromegaly

## 2023-05-25 NOTE — H&P PST ADULT - HISTORY OF PRESENT ILLNESS
malignant neoplasm of lower outer quadrant of left female breast  53 year old male with pre op dx of malignant neoplasm of lower outer quadrant of left female breast is scheduled for left partial mastectomy with seed localization sentinel lymph node biopsy .

## 2023-05-25 NOTE — H&P PST ADULT - PROBLEM SELECTOR PLAN 1
Patient tentatively scheduled for left partial mastectomy with seed localization sentinel lymph node biopsy on 06/06/2023.    Pre-op instructions provided. Pt given verbal and written instructions with teach back on chlorhexidine wash  and pepcid. Pt verbalized understanding with return demonstration.    Labs done.

## 2023-05-25 NOTE — H&P PST ADULT - PROBLEM SELECTOR PLAN 2
Patient instructed to take amlodipine ,metoprolol with a sip of water on the morning of procedure.  Requesting stress report from cardiologist.

## 2023-05-28 NOTE — ASSESSMENT
[FreeTextEntry1] : The visit was provided via telehealth using real-time 2-way audio visual technology. The patient, Crystal Mahoney, was located at home, [address] [place] [address], at the time of the visit. The Genetic Counselor, Remy Lundberg, and the physician, Dr. Jaspal Ceballos, were located at the medical office located in [place], [place]. The patient and both providers participated in the telehealth encounter. Her  also participated. Consent for telehealth services was given on 05/16/2023 by the patient, Crystal Mahoney. \par \par REASON FOR CONSULT\par Crystal Mahoney is a 53-year-old female who was seen 05/16/2023 for a discussion regarding their genetic testing results related to hereditary cancer predisposition. She was accompanied by her . \par \par Ms. Mahoney was originally seen at [facility] on 02/28/2023 for hereditary cancer predisposition risk assessment. She was recently diagnosed with left breast cancer at age 52 and has a family history of metastatic melanoma and pancreatic cancer. Ms. Mahoney decided to pursue genetic testing using a guidelines-based breast and gyn cancers panel, pancreatic cancer panel and melanoma panel (32 genes) offered by [facility] [employer] [facility]. \par \par TEST RESULTS: CHEK2 POSITIVE (c.349A>G; [id number])\par \par No pathogenic (disease-causing) variants or additional VUSs were detected in the following genes APC*, JUSTIN*, BAP1, BARD1, BMPR1A, BRCA1, BRCA2, BRIP1, CDH1, CDK4, CDKN2A ([id number]), CDKN2A ([id number]), CHEK2, EPCAM*, MEN1*, MITF*, MLH1*, MSH2*, MSH6*, NF1*, PALB2, PMS2*, POT1, PTEN*, RAD51C, RAD51D, RB1*, SMAD4, STK11, TP53, TSC1*, TSC2, VHL \par \par RESULTS INTERPRETATION AND ASSESSMENT:\par We reviewed with Ms. 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 Please note, this variant is classified as low penetrant as it does not confer the same level of cancer risks as other pathogenic variants in the CHEK2 gene. We discussed that while the CHEK2 mutation likely played a role in the development of her breast cancer, it does not fully explain the breast cancer. As part of our discussion, we reviewed the cancer risks associated with the missense CHEK2 mutation: \par  \par BREAST CANCER RISK: \par Lifetime risk to develop female breast cancer is estimated to be 20-40% compared to the general population risk of 12.9% through age 85. For low penetrance CHEK2 mutations, the cumulative lifetime risk of female breast cancer is closer to 20% through age 85.  \par  \par COLORECTAL CANCER RISK: \par Lifetime risk to develop colorectal cancer is estimated to be 8-10% (~2-fold increase) compared to the general population risk of approximately 4.2% through age 85. Given the CHEK2 mutation identified in Ms. Mahoney is a low penetrance mutation, the cumulative lifetime risk of colorectal cancer is likely closer to 6-7% to age 85. \par \par OTHER CANCER RISKS: \par Studies have described a possible increased risk for a wide range of other cancers associated with CHEK2 mutations. However, these studies are not conclusive, and incremental screening for these possible associations is not recommended at this time.  \par \par PATIENT MANAGEMENT IMPLICATIONS: \par Given Ms. Mahoney’s personal and current reported family history of cancer, and her low penetrance CHEK2 genetic test results, the following screening guidelines and risk-reducing recommendations were discussed: \par  \par BREAST: \par - Individuals with a protein truncating mutations in CHEK2 should consider pursuing annual mammograms and breast MRIs. It is less clear what is appropriate breast cancer screening in the setting of a missense mutation, such as Ms. Mahoney. Per Ms. Mahoney, after her scheduled lumpectomy 06/06/2023 the plan is to pursue annual breast MRIs and mammograms, alternating every 6 months with Dr. Samayoa. Given her breast cancer diagnosis and CHEK2 low penetrance mutation, this high-risk breast screening is likely reasonable, and we encouraged Ms. Mahoney to continue follow-up as per Dr. Samayoa’s recommendations. \par  \par COLON: \par - As per current NCCN guidelines, individuals with a pathogenic CHEK2 mutation and first-degree relative with colorectal cancer should undergo colonoscopy every 5 years beginning at age 40 or 10 years prior to age of first-degree relative’s age at CRC diagnosis. However, given the low-penetrance status of this mutation, it is reasonable to consider prolonging the interval between colonoscopy to 5-10 years at the discretion of the patient’s treating gastroenterologist. Of note, Ms. Mahoney reported she had a few polyps at her first and last colonoscopy at age 49 and was told to follow-up in 5 years. She was encouraged to keep this follow-up. \par \par OTHER: \par - In the absence of other indications, Ms. Mahoney should practice age-appropriate cancer screening of other organ systems as recommended for the general population. \par  \par IMPLICATIONS FOR FAMILY MEMBERS: \par This mutation is inherited in an autosomal dominant pattern. We recommend the patient’s first-degree relatives, specifically her sister and mother, pursue genetic counseling and genetic testing as there is a 50% chance they also have the same mutation. Ms. Mahoney was made aware that if any at-risk relatives wanted to pursue genetic testing any time in the future, we would be happy to see them and coordinate testing. If they are not local, they can locate a genetic counselor using the National Society of Genetic Counselors, Find a Genetic Counselor Tool (www.nsgc.org/findageneticcounselor). \par \par The risk of passing on this mutation to a future generation is 50%. Ms. Mahoney’s daughter is 21 years old and has Down Syndrome. Although she has a 50% chance of sharing the same low penetrance CHEK2 mutation, there would be no changes to her medical management at her current age. We discussed it is not unreasonable to delay genetic testing until she is closer to age 40. \par \par RESOURCES & SUPPORT GROUPS \par Facing Our Risk of cancer Empowered (FORCE): www.FacingOurRisk.org   \par [facility] [place]: www.[email or web address]  \par In addition, the oncology social workers at [facility] are available to assist with more referrals, if necessary.\par \par PLAN:\par 1.	See above note for recommended management.\par 2.	We encouraged sharing these results with family members. They have a risk to have inherited the same mutation. Other family may benefit from genetic testing and should contact a certified genetic counselor specializing in cancer. Due to HIPAA and [place] laws, [employer] is unable to directly contact other family at risk, but we are available should family members wish to reach out to us\par 3.	Family support resources and referrals were provided. \par 4.	Patient informed consult note(s) will be available through their [facility] patient portal and genetic test results will be released via [facility] [employer] [facility]’s laboratory portal. \par 5.	Genetic knowledge changes rapidly. We encouraged re-contacting [facility] every 2-3 years for any changes in screening recommendations or sooner if there are significant changes in personal or family history\par \par For any additional questions please call [facility] at [phone]. \par \par \par Remy Lundberg MS, [facility]\par Genetic Counselor, [facility]\par \par \par Attending Attestation:\par \par I have reviewed and edited the genetic counselor's note and I agree with the assessment and plan as documented. I spent approximately 30 minutes in total time of which approximately 15 minutes was face-to-face (via 2-way audiovisual telemedicine connection) with Ms. Mahoney reviewing her relevant personal and family history, the genetic testing results, our risk-assessment, and options for future cancer risk-reduction for the patient and her relevant family members. Over half this time was spent in counseling and coordination of care.\par \par Jaspal Ceballos MD\par Chief, Cancer [facility]\par [facility]\par \par \par CC: \par Patient\par Dr. Beto Preston

## 2023-05-30 ENCOUNTER — APPOINTMENT (OUTPATIENT)
Dept: DERMATOLOGY | Facility: CLINIC | Age: 53
End: 2023-05-30
Payer: COMMERCIAL

## 2023-05-30 PROCEDURE — 99214 OFFICE O/P EST MOD 30 MIN: CPT

## 2023-06-01 PROBLEM — C50.512 MALIGNANT NEOPLASM OF LOWER-OUTER QUADRANT OF LEFT FEMALE BREAST: Chronic | Status: ACTIVE | Noted: 2023-05-25

## 2023-06-02 ENCOUNTER — APPOINTMENT (OUTPATIENT)
Dept: INFUSION THERAPY | Facility: HOSPITAL | Age: 53
End: 2023-06-02

## 2023-06-02 ENCOUNTER — APPOINTMENT (OUTPATIENT)
Dept: HEMATOLOGY ONCOLOGY | Facility: CLINIC | Age: 53
End: 2023-06-02
Payer: COMMERCIAL

## 2023-06-02 VITALS
SYSTOLIC BLOOD PRESSURE: 127 MMHG | OXYGEN SATURATION: 99 % | DIASTOLIC BLOOD PRESSURE: 83 MMHG | WEIGHT: 173.7 LBS | RESPIRATION RATE: 16 BRPM | TEMPERATURE: 96.7 F | HEART RATE: 83 BPM | BODY MASS INDEX: 24.93 KG/M2

## 2023-06-02 PROCEDURE — 99214 OFFICE O/P EST MOD 30 MIN: CPT

## 2023-06-02 NOTE — PHYSICAL EXAM
[Restricted in physically strenuous activity but ambulatory and able to carry out work of a light or sedentary nature] : Status 1- Restricted in physically strenuous activity but ambulatory and able to carry out work of a light or sedentary nature, e.g., light house work, office work [Normal] : affect appropriate [de-identified] : Thyromegaly, L>R [de-identified] : No skin or nipple changes either breast. Right breast with no discrete palpable masses, no palpable axillary nodes. Left breast with no discrete palpable masses, no palpable axillary nodes. \par

## 2023-06-02 NOTE — HISTORY OF PRESENT ILLNESS
[de-identified] : The patient reports having had cosmetic surgery to remove "fat from my axilla," with the patient noting that this was "extra breast tissue."  She had a first procedure in the right axilla approximately 15 years ago with pathology returning negative per patient reporting, and more recently had a similar procedure in her left axilla, "left armpit fat," with the patient also noting that the pathology returned negative.  I do not have a copy of either of these pathology reports.\par \par The patient reports she was not happy with the result, noting that there seemed to be residual tissue in the left axilla.  She kept palpating this area.  She ultimately felt a lump in the left breast, but not near the armpit.  She returned to her plastic surgeon, who palpated the same and noted this was "definitely something else."  Consequently, she was referred for a diagnostic mammogram on 10/10/2022 (with her last prior mammogram approximately 1-2 years earlier).  The right mammogram returned unremarkable; in the upper left breast, middle depth, there was an area of subtle architectural distortion; there was also stable benign-appearing mass in the central left breast at the middle to posterior depth; there were bilateral benign-appearing calcifications.  An ultrasound performed on that same date in the left breast demonstrated left breast 1 o'clock axis 8 centimeters from nipple, likely corresponding to the subtle distortion seen mammographically, there was an 8 x 8 x 8 millimeter irregular hypoechoic mass, which appeared to be associated with some subtle sonographic distortion with ultrasound-guided core biopsy recommended; in the left breast 5 o'clock axis 3 centimeters from nipple, where the patient felt a lump, there was a vague 15 x 6 x 10 millimeter irregular hypoechoic mass; there was no suspicious sonographic findings in the left axilla.  The patient ultimately went on to have an ultrasound-guided core biopsy on 10/14/2022 of the left breast 1 o'clock lesion with a finding of invasive ductal carcinoma, moderately differentiated, estrogen receptor positive, progesterone receptor positive and HER-2/michelle negative; ultrasound-guided biopsy of left breast 5 o'clock lesion showing invasive ductal carcinoma, moderately differentiated, progesterone receptor positive, HER-2/michelle positive.  There was a comment that the 2 lesions were 5.4 centimeters apart, and a pretreatment breast MRI was recommended.  The patient then went on to see Dr. Tierra Samayoa and a bilateral breast MRI was performed on 10/20/2022 with findings of a mass and non-mass enhancement surrounding the biopsy marker in the upper outer breast middle to posterior depth spanning up to 3.5 centimeters x 2.5 centimeters corresponding to the site of newly diagnosed malignancy; there was a mass and non-mass enhancement in the lower outer left breast 5 o'clock position anterior to middle depth, spanning 3.7 x 2.0 centimeters surrounding a biopsy marker; the enhancement was fairly superficial in location, part of which was located in subcutaneous fat without overlying skin enhancement; there was a 6 millimeter focus of irregular, superficial, non-mass enhancement in the lower outer anterior breast located approximately 1.3-1.4 centimeters from nipple within contiguous non-mass enhancement posterior to the index carcinoma at the 5 o'clock position; no axillary right adenopathy was noted; no significant internal mammary adenopathy was noted; there were 2 adjacent prominent level 1 lymph nodes with possible thickened cortex measuring up to 1.5 centimeters in length in the left axilla with no overlying skin thickening or edema in the axilla.  The patient reports being scheduled for a biopsy of the left axillary lymph nodes within the next several days.\par \par The patient is seen today in consultation regarding further treatment recommendations.  She notes a good appetite, stable weight, and excellent performance status.\par \par A review of 10 systems is remarkable for anxiety since her breast cancer diagnosis; she has a history of chronic aches in her feet, hands, and right hip for which an extensive prior workups were negative; she has seen rheumatologist for multiple tests, which all returned negative per patient's report.\par \par She is s/p 2 of TCHP, tolerated fairly well except that on the date of treatment her creat had increased (prev 0.6 to 1.1 on date if treatment) but with the carbo dose based on pre-treatment creatinine. She had follow up BMPs with creat as high as 1.56  in the intertreatment interval. Pt receive IVF wit gradual improvement but not back to baseline. TCHP cycle 2 held / delayed but creat did not recover yet to baseline. Spoke with pt and recommended switching to ACTHP instead - she agreed. \par \par She is s/p TC 2/2 12/16/22, treatment regimen switched to ACTHP secondary to decreased renal function.  She is s/p AC 2/2 2/3/23. S/p taxol 11/12.  C12 held because of worsening neuropathy. \par  [de-identified] : She is s/p TC 2/2 12/16/22, treatment regimen switched to ACTHP secondary to decreased renal function.  She is s/p AC 2/2 2/3/23. S/p taxol 11/12.  C12 held because of worsening neuropathy. \par \par Presents for follow up/pretreatment.\par \par Continues on herceptin/perjeta. \par \par She is still experiencing numbness in feet/ankle and tips of fingers.  States difficulty with fine motor skills such as clasping jewelry.  Denies dropping things or tripping/falling. \par  \par Denies all other complaints.\par \par Breast MRI, 4/25/23- IMPRESSION:  No measurable enhancing lesion associated with a biopsy clip in the left upper outer breast corresponding to the biopsy-proven site of malignancy described in the 1:00 location which measured 3.5 cm on the prior exam.\par Significantly decreased area of residual nonspecific mild nonmass enhancement with interspersed foci in the left lower outer breast measuring 1.8 cm on the current study, corresponding to the site of biopsy-proven malignancy labeled in the 5:00 location which measured 3.7 cm on the prior exam, consistent with partial to near complete imaging response to neoadjuvant therapy.\par Previously noted 6 mm superficial enhancing focus in the anterior left lateral breast is no longer visualized.\par Adjacent level 1 left axillary lymph nodes are decreased in size as described above, previously noted to measure 1.5 cm in largest dimension. No right axillary or internal mammary lymphadenopathy is appreciated.\par No suspicious enhancing findings in the right breast.\par \par Echo, 4/24/23- LVEF 55-60%\par \par Echo 1/31/23 LVEF 55-60%

## 2023-06-05 PROCEDURE — 88307 TISSUE EXAM BY PATHOLOGIST: CPT | Mod: 26

## 2023-06-05 PROCEDURE — 88305 TISSUE EXAM BY PATHOLOGIST: CPT | Mod: 26

## 2023-06-05 PROCEDURE — 88331 PATH CONSLTJ SURG 1 BLK 1SPC: CPT | Mod: 26

## 2023-06-05 NOTE — ASU PATIENT PROFILE, ADULT - PRO INTERPRETER NEED 2
Gabby Paulino 
 
 
 [address] 
[phone] Patient: Jayden Farrar MRN: [id number] UWQ:[id number]/[id number] You are allergic to the following Allergen Reactions Adhesive Hives Corticosteroids (Glucocorticoids) Rash Cymbalta (Duloxetine) Vertigo Pt gets vertigo Darvon (Propoxyphene) Rash Dilaudid (Hydromorphone (Pf)) Nausea and Vomiting Vertigo Erythromycin Other (comments) Migraine headache Lyrica (Pregabalin) Vertigo Other Medication Other (comments) Steroid injections caused facial redness Oxycodone Other (comments)  
 hallucinations Pcn (Penicillins) Contact Dermatitis OK with Keflex/Ancef 4/16/14 Prednisone Rash Tetracycline Other (comments)  
 headache Tramadol Rash Vancomycin Rash  
 redness Recent Documentation Height Weight Breastfeeding? BMI OB Status Smoking Status 1.6 m 79.4 kg No 31 kg/m2 Hysterectomy Former Smoker Emergency Contacts Name Discharge Info Relation Home Work [facility] DISCHARGE CAREGIVER [3] Spouse [3]  [phone] About your hospitalization You were admitted on:  July 18, 2017 You last received care in the:  [facility] ENDOSCOPY You were discharged on:  July 18, 2017 Unit phone number:  [phone] Why you were hospitalized Your primary diagnosis was:  Not on File Providers Seen During Your Hospitalizations Provider Role Specialty Primary office phone Fabiana Elmore MD Attending Provider Gastroenterology [phone] Your Primary Care Physician (PCP) Primary Care Physician Office Phone Office Fax Martha Ceron [phone] Follow-up Information None Your Appointments  Thursday July 27, 2017  1:40 PM EDT  
ESTABLISHED PATIENT with Kallie Leonardo MD  
 ERIC Samm [address] ([address]) [address] [facility] [address] [facility] [place] [address] [id number]  
[phone] Tuesday August 01, 2017  9:00 AM EDT  
COMPLETE PHYSICAL with Liz Isbell MD  
[facility] [address] [facility]) [place] [address] [place] [facility]  
[phone] Wednesday August 16, 2017  2:30 PM EDT ROUTINE CARE with Roger Phipps MD  
[facility] [address] [place] [address]) [address] [place] [address] [address] [place] [address]  
[phone] Current Discharge Medication List  
  
CONTINUE these medications which have NOT CHANGED Dose & Instructions Dispensing Information Comments Morning Noon Evening Bedtime  
 albuterol 90 mcg/actuation inhaler Commonly known as:  PROAIR HFA Your last dose was: Your next dose is:    
   
   
 Dose:  2 Puff Take 2 Puffs by inhalation every four (4) hours as needed for Wheezing or Shortness of Breath. Quantity:  1 Inhaler Refills:  1  
     
   
   
   
  
 atorvastatin 10 mg tablet Commonly known as:  LIPITOR Your last dose was: Your next dose is: TAKE 1 TABLET DAILY Quantity:  90 Tab Refills:  1  
     
   
   
   
  
 diclofenac EC 50 mg EC tablet Commonly known as:  VOLTAREN Your last dose was: Your next dose is: Take  by mouth two (2) times a day. Refills:  0  
     
   
   
   
  
 liothyronine 5 mcg tablet Commonly known as:  CYTOMEL Your last dose was: Your next dose is:    
   
   
 Dose:  5 mcg Take 1 Tab by mouth daily. Quantity:  180 Tab Refills:  2  
     
   
   
   
  
 pantoprazole 40 mg tablet Commonly known as:  PROTONIX Your last dose was: Your next dose is:    
   
   
 Dose:  40 mg Take 1 Tab by mouth two (2) times a day. Indications: gastroesophageal reflux disease Quantity:  180 Tab Refills:  3 PLAQUENIL 200 mg tablet Generic drug:  hydroxychloroquine Your last dose was: Your next dose is:    
   
   
 Dose:  200 mg Take 200 mg by mouth two (2) times a day. Refills:  0  
     
   
   
   
  
 TIROSINT 75 mcg Cap Generic drug:  Levothyroxine Your last dose was: Your next dose is:    
   
   
 Dose:  1 Cap Take 1 Cap by mouth daily. Quantity:  90 Cap Refills:  2 VITAMIN D2 50,000 unit capsule Generic drug:  ergocalciferol Your last dose was: Your next dose is: TAKE 1 CAPSULE TWICE WEEKLY Quantity:  27 Cap Refills:  3 Discharge Instructions [address]. Sunny Valdivia, [address] [place] [address] 
[phone] July 18, 2017 Yumiko Gudino YOB: 1961 ENDOSCOPY DISCHARGE INSTRUCTIONS If there is redness at IV site you should apply warm compress to area. If redness or soreness persist contact Dr. Sunny Valdivia' or your primary care doctor. Gaseous discomfort may develop, but walking, belching will help relieve this. You may not operate a vehicle for 12 hours You may not operate machinery or dangerous appliances for rest of today You may not drink alcoholic beverages for 12 hours Avoid making any critical decisions for 24 hours DIET: 
You may resume your normal diet, but some patients find that heavy or large meals may lead to indigestion or vomiting. I suggest a light meal as first food intake. MEDICATIONS: 
The use of some over-the-counter pain medication may lead to bleeding after biopsies or other procedures you may have had done. Tylenol (also called acetaminophen) is safe to take and will not lead to bleeding.   Based on the procedure you had today you may safely take aspirin or aspirin-like products for the next ten (10) days. ACTIVITY: 
You may resume your normal household activities, but it is recommended that you spend the remainder of the day resting -  avoid any strenuous activity. CALL DR. Nancy Vera' OFFICE IF: Increasing pain, nausea, vomiting Abdominal distension (swelling) Significant new or increased bleeding (oral or rectal) Fever/Chills Chest pain/shortness of breath Additional instructions:  
Hold the pantoprazole for 2 days while having the pH test done. Follow the pH / Bravo instructions. I will contact you with results and forward them to Dr. Rodrigo Dos Santos when available. It was an honor to be your doctor today. Please let me or my office staff know if you have any feedback about today's procedure. Kacy Arnold MD 
 
 
 
 
Discharge Orders None [facility] SERVICES! Dear Maurice Mathews: Thank you for requesting a [employer] account. Our records indicate that you already have an active [employer] account. You can access your account anytime at [email or web address] Did you know that you can access your hospital and ER discharge instructions at any time in [employer]? You can also review all of your test results from your hospital stay or ER visit. Additional Information If you have questions, please visit the Frequently Asked Questions section of the [employer] website at [email or web address] Remember, [employer] is NOT to be used for urgent needs. For medical emergencies, dial 911. Now available from your iPhone and Android! General Information Please provide this summary of care documentation to your next provider. Patient Signature:  ____________________________________________________________ Date:  ____________________________________________________________  
  
Jadon Boudreaux  Provider Signature: ____________________________________________________________ Date:  ____________________________________________________________ English

## 2023-06-05 NOTE — ASU PATIENT PROFILE, ADULT - FALL HARM RISK - FALLEN IN PAST
54 No

## 2023-06-05 NOTE — ASU PATIENT PROFILE, ADULT - FALL HARM RISK - UNIVERSAL INTERVENTIONS
Bed in lowest position, wheels locked, appropriate side rails in place/Call bell, personal items and telephone in reach/Instruct patient to call for assistance before getting out of bed or chair/Non-slip footwear when patient is out of bed/[place] to call system/Physically safe environment - no spills, clutter or unnecessary equipment/Purposeful Proactive Rounding/Room/bathroom lighting operational, light cord in reach

## 2023-06-06 ENCOUNTER — OUTPATIENT (OUTPATIENT)
Dept: OUTPATIENT SERVICES | Facility: HOSPITAL | Age: 53
LOS: 1 days | Discharge: HOME | End: 2023-06-06
Payer: COMMERCIAL

## 2023-06-06 ENCOUNTER — TRANSCRIPTION ENCOUNTER (OUTPATIENT)
Age: 53
End: 2023-06-06

## 2023-06-06 ENCOUNTER — APPOINTMENT (OUTPATIENT)
Dept: NUCLEAR MEDICINE | Facility: HOSPITAL | Age: 53
End: 2023-06-06

## 2023-06-06 ENCOUNTER — APPOINTMENT (OUTPATIENT)
Dept: SURGERY | Facility: HOSPITAL | Age: 53
End: 2023-06-06

## 2023-06-06 VITALS
OXYGEN SATURATION: 96 % | TEMPERATURE: 99 F | WEIGHT: 169.98 LBS | HEART RATE: 79 BPM | DIASTOLIC BLOOD PRESSURE: 86 MMHG | RESPIRATION RATE: 16 BRPM | HEIGHT: 70 IN | SYSTOLIC BLOOD PRESSURE: 136 MMHG

## 2023-06-06 VITALS
DIASTOLIC BLOOD PRESSURE: 71 MMHG | SYSTOLIC BLOOD PRESSURE: 117 MMHG | HEART RATE: 67 BPM | RESPIRATION RATE: 15 BRPM | OXYGEN SATURATION: 97 % | TEMPERATURE: 98 F

## 2023-06-06 DIAGNOSIS — Z98.890 OTHER SPECIFIED POSTPROCEDURAL STATES: Chronic | ICD-10-CM

## 2023-06-06 DIAGNOSIS — C50.412 MALIGNANT NEOPLASM OF UPPER-OUTER QUADRANT OF LEFT FEMALE BREAST: ICD-10-CM

## 2023-06-06 LAB — HCG UR QL: NEGATIVE — SIGNIFICANT CHANGE UP

## 2023-06-06 PROCEDURE — 76098 X-RAY EXAM SURGICAL SPECIMEN: CPT | Mod: 26

## 2023-06-06 PROCEDURE — 19301K: CUSTOM | Mod: LT

## 2023-06-06 PROCEDURE — 38525K: CUSTOM | Mod: LT

## 2023-06-06 PROCEDURE — 38792K: CUSTOM | Mod: LT

## 2023-06-06 RX ORDER — METOPROLOL TARTRATE 50 MG
1 TABLET ORAL
Qty: 0 | Refills: 0 | DISCHARGE

## 2023-06-06 RX ORDER — ROSUVASTATIN CALCIUM 5 MG/1
5 TABLET ORAL
Qty: 0 | Refills: 0 | DISCHARGE

## 2023-06-06 RX ORDER — FAMOTIDINE 10 MG/ML
0 INJECTION INTRAVENOUS
Qty: 0 | Refills: 0 | DISCHARGE

## 2023-06-06 RX ORDER — OXYCODONE HYDROCHLORIDE 5 MG/1
5 TABLET ORAL ONCE
Refills: 0 | Status: DISCONTINUED | OUTPATIENT
Start: 2023-06-06 | End: 2023-06-06

## 2023-06-06 RX ORDER — ONDANSETRON 8 MG/1
4 TABLET, FILM COATED ORAL ONCE
Refills: 0 | Status: DISCONTINUED | OUTPATIENT
Start: 2023-06-06 | End: 2023-06-20

## 2023-06-06 NOTE — ASU DISCHARGE PLAN (ADULT/PEDIATRIC) - NS MD DC FALL RISK RISK
For information on Fall & Injury Prevention, visit: [email or web address] OR  [email or web address] OR  https://www.cdc.gov/steadi/patient.html

## 2023-06-06 NOTE — ASU DISCHARGE PLAN (ADULT/PEDIATRIC) - ASU DC SPECIAL INSTRUCTIONSFT
Breast Biopsy/Lumpectomy Post-operative Instructions  1.	Supportive bra- Please bring a sports/athletic bra with you on the day of your surgery.  You will wear it home from the hospital.  You should wear the supportive bra continuously for 48 hours after the procedure, including wearing it to sleep.  Therefore, you may wear your regular bra.  You may remove the bra to shower.  The sports bra will provide support, decrease the amount of swelling at the biopsy site, and make your recovery more comfortable.    2.	Wound dressing- There are white surgical tapes (called steri strips) which are directly adherent to the skin.  These should remain on the skin, and will peel off naturally.  All of the stitches are “internal” and will dissolve naturally.    3.	Showering/Bathing- You may shower over the dressing the very next day after surgery.  Allow the water to run over the dressing, but don not scrub the area.  It is best not to sit in a bathtub or swimming pool for at least one week after surgery.    4.	Activity level- You may resume most normal daily activity as tolerated, but avoid strenuous activities such as aerobics, jogging, exercising or heavy lifting for at least 1 week after surgery.  You may return to work in 1-2 days after surgery.  You may drive as long as you are not taking any prescription pain medication.    5.	Pain Medication- You may take the prescribed medication, or you may take extra-strength Tylenol as needed.  Please don to take aspirin, Motrin, Advil or any other anti-inflammatory medications, as these medications may cause bleeding or bruising.    6.	Follow-up Appointment- Please call the office to schedule your post-operative appointment which should be approximately 10 days after your surgery. Office [phone]    7.	Bruising/Bleeding/Swelling- It is normal for there to be some bruising and swelling at the breast biopsy site, and there may be some staining of blood on to the dressing.  Some discomfort at the surgical site is expected.  If your symptoms seem excessive, or if you have any question or concerns, please call the office.      Anesthesia Precautions:  For the next 12 hours do not:   •	drive a car,  •	drink alcohol, beer, or wine,   •	make important personal or business decisions  Diet:   •	Progress diet slowly unless otherwise indicated    Physician Notification  •	Any Prescription issues  •	Bleeding that does not stop  •	Persistent nausea and vomiting  •	Pain not relieved by medications  •	Fever greater than 101®F  •	Inability to tolerate liquids or foods  •	Unable to urinate after 8 hours  Discharge and Disposition  •	Discharge to home/ group home/assisted living  •	Accompanied by Family/Spouse/ Parents/ Significant Other/ Friend/ and or Caregiver    Follow Up Care:  •	In the event that you develop a complication and you are unable to reach your own physician, you may contact:  911 or go to the nearest Emergency Room.   •	Please call your surgeon to schedule your follow up appointment

## 2023-06-06 NOTE — ASU DISCHARGE PLAN (ADULT/PEDIATRIC) - CARE PROVIDER_API CALL
Tierra Samayoa  Surgical Oncology  [address] [facility], [address]  [place], [place] [id number]  Phone: [phone]  Fax: [phone]  Follow Up Time: 2 weeks

## 2023-06-06 NOTE — ASU DISCHARGE PLAN (ADULT/PEDIATRIC) - FOLLOW UP APPOINTMENTS
[facility] After 8 pm PACU /[facility], [facility]

## 2023-06-06 NOTE — ASU DISCHARGE PLAN (ADULT/PEDIATRIC) - SIGNS AND SYMPTOMS OF INFECTION: FEVER, REDNESS, SWELLING, FOUL SMELLING DISCHARGE
- Call your doctor if you experience:  • An increase in pain not controlled by pain medication or change in activity or  position.  • Temperature greater than 101° F.  • Redness, increased swelling or foul smelling drainage from or around the  incision.  • Numbness, tingling or a change in color or temperature of the operative leg.  • Call your doctor immediately if you experience chest pain, shortness of breath or calf pain.  
Statement Selected

## 2023-06-09 LAB — SURGICAL PATHOLOGY STUDY: SIGNIFICANT CHANGE UP

## 2023-06-12 ENCOUNTER — APPOINTMENT (OUTPATIENT)
Dept: SURGERY | Facility: CLINIC | Age: 53
End: 2023-06-12
Payer: COMMERCIAL

## 2023-06-12 PROCEDURE — 99024 POSTOP FOLLOW-UP VISIT: CPT

## 2023-06-14 ENCOUNTER — OUTPATIENT (OUTPATIENT)
Dept: OUTPATIENT SERVICES | Facility: HOSPITAL | Age: 53
LOS: 1 days | Discharge: HOME | End: 2023-06-14

## 2023-06-14 DIAGNOSIS — C50.919 MALIGNANT NEOPLASM OF UNSPECIFIED SITE OF UNSPECIFIED FEMALE BREAST: ICD-10-CM

## 2023-06-14 DIAGNOSIS — Z98.890 OTHER SPECIFIED POSTPROCEDURAL STATES: Chronic | ICD-10-CM

## 2023-06-19 ENCOUNTER — APPOINTMENT (OUTPATIENT)
Dept: SURGERY | Facility: CLINIC | Age: 53
End: 2023-06-19
Payer: COMMERCIAL

## 2023-06-19 PROCEDURE — 99024 POSTOP FOLLOW-UP VISIT: CPT

## 2023-06-23 ENCOUNTER — APPOINTMENT (OUTPATIENT)
Dept: HEMATOLOGY ONCOLOGY | Facility: CLINIC | Age: 53
End: 2023-06-23
Payer: COMMERCIAL

## 2023-06-23 ENCOUNTER — APPOINTMENT (OUTPATIENT)
Dept: INFUSION THERAPY | Facility: HOSPITAL | Age: 53
End: 2023-06-23

## 2023-06-23 VITALS
HEART RATE: 71 BPM | DIASTOLIC BLOOD PRESSURE: 83 MMHG | TEMPERATURE: 97 F | RESPIRATION RATE: 16 BRPM | WEIGHT: 174.58 LBS | SYSTOLIC BLOOD PRESSURE: 126 MMHG | BODY MASS INDEX: 25.05 KG/M2 | OXYGEN SATURATION: 97 %

## 2023-06-23 PROCEDURE — 99214 OFFICE O/P EST MOD 30 MIN: CPT

## 2023-06-26 DIAGNOSIS — Z51.11 ENCOUNTER FOR ANTINEOPLASTIC CHEMOTHERAPY: ICD-10-CM

## 2023-06-26 NOTE — PHYSICAL EXAM
[Restricted in physically strenuous activity but ambulatory and able to carry out work of a light or sedentary nature] : Status 1- Restricted in physically strenuous activity but ambulatory and able to carry out work of a light or sedentary nature, e.g., light house work, office work [Normal] : affect appropriate [de-identified] : Thyromegaly, L>R [de-identified] : R w/o nipple retraction skin dimpling or palp masses. L s/p LE with well healing scar, no nipple retraction skin dimpling or palp masses. B/Lax neg.

## 2023-06-26 NOTE — HISTORY OF PRESENT ILLNESS
[de-identified] : The patient reports having had cosmetic surgery to remove "fat from my axilla," with the patient noting that this was "extra breast tissue."  She had a first procedure in the right axilla approximately 15 years ago with pathology returning negative per patient reporting, and more recently had a similar procedure in her left axilla, "left armpit fat," with the patient also noting that the pathology returned negative.  I do not have a copy of either of these pathology reports.\par \par The patient reports she was not happy with the result, noting that there seemed to be residual tissue in the left axilla.  She kept palpating this area.  She ultimately felt a lump in the left breast, but not near the armpit.  She returned to her plastic surgeon, who palpated the same and noted this was "definitely something else."  Consequently, she was referred for a diagnostic mammogram on 10/10/2022 (with her last prior mammogram approximately 1-2 years earlier).  The right mammogram returned unremarkable; in the upper left breast, middle depth, there was an area of subtle architectural distortion; there was also stable benign-appearing mass in the central left breast at the middle to posterior depth; there were bilateral benign-appearing calcifications.  An ultrasound performed on that same date in the left breast demonstrated left breast 1 o'clock axis 8 centimeters from nipple, likely corresponding to the subtle distortion seen mammographically, there was an 8 x 8 x 8 millimeter irregular hypoechoic mass, which appeared to be associated with some subtle sonographic distortion with ultrasound-guided core biopsy recommended; in the left breast 5 o'clock axis 3 centimeters from nipple, where the patient felt a lump, there was a vague 15 x 6 x 10 millimeter irregular hypoechoic mass; there was no suspicious sonographic findings in the left axilla.  The patient ultimately went on to have an ultrasound-guided core biopsy on 10/14/2022 of the left breast 1 o'clock lesion with a finding of invasive ductal carcinoma, moderately differentiated, estrogen receptor positive, progesterone receptor positive and HER-2/michelle negative; ultrasound-guided biopsy of left breast 5 o'clock lesion showing invasive ductal carcinoma, moderately differentiated, progesterone receptor positive, HER-2/michelle positive.  There was a comment that the 2 lesions were 5.4 centimeters apart, and a pretreatment breast MRI was recommended.  The patient then went on to see Dr. Tierra Samayoa and a bilateral breast MRI was performed on 10/20/2022 with findings of a mass and non-mass enhancement surrounding the biopsy marker in the upper outer breast middle to posterior depth spanning up to 3.5 centimeters x 2.5 centimeters corresponding to the site of newly diagnosed malignancy; there was a mass and non-mass enhancement in the lower outer left breast 5 o'clock position anterior to middle depth, spanning 3.7 x 2.0 centimeters surrounding a biopsy marker; the enhancement was fairly superficial in location, part of which was located in subcutaneous fat without overlying skin enhancement; there was a 6 millimeter focus of irregular, superficial, non-mass enhancement in the lower outer anterior breast located approximately 1.3-1.4 centimeters from nipple within contiguous non-mass enhancement posterior to the index carcinoma at the 5 o'clock position; no axillary right adenopathy was noted; no significant internal mammary adenopathy was noted; there were 2 adjacent prominent level 1 lymph nodes with possible thickened cortex measuring up to 1.5 centimeters in length in the left axilla with no overlying skin thickening or edema in the axilla.  The patient reports being scheduled for a biopsy of the left axillary lymph nodes within the next several days.\par \par The patient is seen today in consultation regarding further treatment recommendations.  She notes a good appetite, stable weight, and excellent performance status.\par \par A review of 10 systems is remarkable for anxiety since her breast cancer diagnosis; she has a history of chronic aches in her feet, hands, and right hip for which an extensive prior workups were negative; she has seen rheumatologist for multiple tests, which all returned negative per patient's report.\par \par She is s/p 2 of TCHP, tolerated fairly well except that on the date of treatment her creat had increased (prev 0.6 to 1.1 on date if treatment) but with the carbo dose based on pre-treatment creatinine. She had follow up BMPs with creat as high as 1.56  in the intertreatment interval. Pt receive IVF wit gradual improvement but not back to baseline. TCHP cycle 2 held / delayed but creat did not recover yet to baseline. Spoke with pt and recommended switching to ACTHP instead - she agreed. \par \par She is s/p TC 2/2 12/16/22, treatment regimen switched to ACTHP secondary to decreased renal function.  She is s/p AC 2/2 2/3/23. S/p taxol 11/12.  C12 held because of worsening neuropathy. \par  [de-identified] : She is s/p TC 2/2 12/16/22, treatment regimen switched to ACTHP secondary to decreased renal function.  She is s/p AC 2/2 2/3/23. S/p taxol 11/12.  C12 held because of worsening neuropathy. \par \par Continued on herceptin/perjeta. \par \par On 6/5/23 had a LLE / SLND with findings:\par \par Specimen(s) Submitted\par 1-Left lumpectomy 1:00\par 2-Left superior # 1\par 3-Left medial # 1\par 4-Left inferior # 1\par 5-Left lateral # 1\par 6-Left lumpectomy 5:00\par 7-Left superior # 2\par 8-Left medial # 2\par 9-Left inferior # 2\par 10-Left lateral # 2\par 11-Left sentinel lymph node.\par \par \par Final Diagnosis\par 1. Breast, left 1 o'clock, lumpectomy:\par - No residual carcinoma, status post neoadjuvant therapy\par - Benign breast tissue with duct ectasia\par - Biopsy site and tumor bed changes\par - Please see Synoptic Summary\par \par 2. Breast, left superior margin #1, excision:\par - Benign breast tissue\par \par 3. Breast, left medial margin #1, excision:\par - Benign breast tissue with calcifications\par \par 4. Breast, left inferior margin #1, excision:\par - Benign breast tissue\par \par 5. Breast, left lateral margin #1, excision:\par - Benign breast tissue with cysts, calcifications, and reactive\par myofibroblasts\par \par Comment: Select slides were shown in intradepartmental consultation with\par agreement of the diagnosis.\par \par 6. Breast, left 5 o'clock, lumpectomy:\par - No residual carcinoma, status post neoadjuvant therapy\par - Benign breast tissue with calcifications\par - Biopsy site and tumor bed changes\par - Please see Synoptic Summary\par \par 7. Breast, left superior margin #2, excision:\par - Benign breast tissue\par \par 8. Breast, left medial margin #2, excision:\par - Benign breast tissue\par \par 9. Breast, left inferior margin #2, excision:\par - Benign breast tissue\par \par 10. Breast, left lateral margin #2, excision:\par - Benign breast tissue\par \par 11. 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 at this time.  \par  \par Denies all other complaints.\par \par \par Echo, 4/24/23- LVEF 55-60%\par \par

## 2023-07-14 ENCOUNTER — APPOINTMENT (OUTPATIENT)
Dept: INFUSION THERAPY | Facility: HOSPITAL | Age: 53
End: 2023-07-14

## 2023-07-23 ENCOUNTER — FORM ENCOUNTER (OUTPATIENT)
Age: 53
End: 2023-07-23

## 2023-08-04 ENCOUNTER — RESULT REVIEW (OUTPATIENT)
Age: 53
End: 2023-08-04

## 2023-08-04 ENCOUNTER — APPOINTMENT (OUTPATIENT)
Dept: HEMATOLOGY ONCOLOGY | Facility: CLINIC | Age: 53
End: 2023-08-04
Payer: COMMERCIAL

## 2023-08-04 ENCOUNTER — APPOINTMENT (OUTPATIENT)
Dept: INFUSION THERAPY | Facility: HOSPITAL | Age: 53
End: 2023-08-04

## 2023-08-04 VITALS
HEIGHT: 70 IN | DIASTOLIC BLOOD PRESSURE: 91 MMHG | TEMPERATURE: 97 F | RESPIRATION RATE: 16 BRPM | HEART RATE: 86 BPM | OXYGEN SATURATION: 99 % | WEIGHT: 179.24 LBS | SYSTOLIC BLOOD PRESSURE: 136 MMHG | BODY MASS INDEX: 25.66 KG/M2

## 2023-08-04 PROCEDURE — 99214 OFFICE O/P EST MOD 30 MIN: CPT

## 2023-08-04 NOTE — HISTORY OF PRESENT ILLNESS
[de-identified] : The patient reports having had cosmetic surgery to remove "fat from my axilla," with the patient noting that this was "extra breast tissue."  She had a first procedure in the right axilla approximately 15 years ago with pathology returning negative per patient reporting, and more recently had a similar procedure in her left axilla, "left armpit fat," with the patient also noting that the pathology returned negative.  I do not have a copy of either of these pathology reports.  The patient reports she was not happy with the result, noting that there seemed to be residual tissue in the left axilla.  She kept palpating this area.  She ultimately felt a lump in the left breast, but not near the armpit.  She returned to her plastic surgeon, who palpated the same and noted this was "definitely something else."  Consequently, she was referred for a diagnostic mammogram on 10/10/2022 (with her last prior mammogram approximately 1-2 years earlier).  The right mammogram returned unremarkable; in the upper left breast, middle depth, there was an area of subtle architectural distortion; there was also stable benign-appearing mass in the central left breast at the middle to posterior depth; there were bilateral benign-appearing calcifications.  An ultrasound performed on that same date in the left breast demonstrated left breast 1 o'clock axis 8 centimeters from nipple, likely corresponding to the subtle distortion seen mammographically, there was an 8 x 8 x 8 millimeter irregular hypoechoic mass, which appeared to be associated with some subtle sonographic distortion with ultrasound-guided core biopsy recommended; in the left breast 5 o'clock axis 3 centimeters from nipple, where the patient felt a lump, there was a vague 15 x 6 x 10 millimeter irregular hypoechoic mass; there was no suspicious sonographic findings in the left axilla.  The patient ultimately went on to have an ultrasound-guided core biopsy on 10/14/2022 of the left breast 1 o'clock lesion with a finding of invasive ductal carcinoma, moderately differentiated, estrogen receptor positive, progesterone receptor positive and HER-2/michelle negative; ultrasound-guided biopsy of left breast 5 o'clock lesion showing invasive ductal carcinoma, moderately differentiated, progesterone receptor positive, HER-2/michelle positive.  There was a comment that the 2 lesions were 5.4 centimeters apart, and a pretreatment breast MRI was recommended.  The patient then went on to see Dr. Tierra Samay[place] and a bilateral breast MRI was performed on 10/20/2022 with findings of a mass and non-mass enhancement surrounding the biopsy marker in the upper outer breast middle to posterior depth spanning up to 3.5 centimeters x 2.5 centimeters corresponding to the site of newly diagnosed malignancy; there was a mass and non-mass enhancement in the lower outer left breast 5 o'clock position anterior to middle depth, spanning 3.7 x 2.0 centimeters surrounding a biopsy marker; the enhancement was fairly superficial in location, part of which was located in subcutaneous fat without overlying skin enhancement; there was a 6 millimeter focus of irregular, superficial, non-mass enhancement in the lower outer anterior breast located approximately 1.3-1.4 centimeters from nipple within contiguous non-mass enhancement posterior to the index carcinoma at the 5 o'clock position; no axillary right adenopathy was noted; no significant internal mammary adenopathy was noted; there were 2 adjacent prominent level 1 lymph nodes with possible thickened cortex measuring up to 1.5 centimeters in length in the left axilla with no overlying skin thickening or edema in the axilla.  The patient reports being scheduled for a biopsy of the left axillary lymph nodes within the next several days.  The patient is seen today in consultation regarding further treatment recommendations.  She notes a good appetite, stable weight, and excellent performance status.  A review of 10 systems is remarkable for anxiety since her breast cancer diagnosis; she has a history of chronic aches in her feet, hands, and right hip for which an extensive prior workups were negative; she has seen rheumatologist for multiple tests, which all returned negative per patient's report.  She is s/p 2 of TCHP, tolerated fairly well except that on the date of treatment her creat had increased (prev 0.6 to 1.1 on date if treatment) but with the carbo dose based on pre-treatment creatinine. She had follow up BMPs with creat as high as 1.56  in the intertreatment interval. Pt receive IVF wit gradual improvement but not back to baseline. TCHP cycle 2 held / delayed but creat did not recover yet to baseline. Spoke with pt and recommended switching to ACTHP instead - she agreed.   She is s/p TC 2/2 12/16/22, treatment regimen switched to ACTHP secondary to decreased renal function.  She is s/p AC 2/2 2/3/23. S/p taxol 11/12.  C12 held because of worsening neuropathy.   On 6/5/23 had a LLE / SLND with findings:  Specimen(s) Submitted 1-Left lumpectomy 1:00 2-Left superior # 1 3-Left medial # 1 4-Left inferior # 1 5-Left lateral # 1 6-Left lumpectomy 5:00 7-Left superior # 2 8-Left medial # 2 9-Left inferior # 2 10-Left lateral # 2 11-Left sentinel lymph node.   Final Diagnosis 1. Breast, left 1 o'clock, lumpectomy: - No residual carcinoma, status post neoadjuvant therapy - Benign breast tissue with duct ectasia - Biopsy site and tumor bed changes - Please see Synoptic Summary  2. Breast, left superior margin #1, excision: - Benign breast tissue  3. Breast, left medial margin #1, excision: - Benign breast tissue with calcifications  4. Breast, left inferior margin #1, excision: - Benign breast tissue  5. Breast, left lateral margin #1, excision: - Benign breast tissue with cysts, calcifications, and reactive myofibroblasts  Comment: Select slides were shown in intradepartmental consultation with agreement of the diagnosis.  6. Breast, left 5 o'clock, lumpectomy: - No residual carcinoma, status post neoadjuvant therapy - Benign breast tissue with calcifications - Biopsy site and tumor bed changes - Please see Synoptic Summary  7. Breast, left superior margin #2, excision: - Benign breast tissue  8. Breast, left medial margin #2, excision: - Benign breast tissue  9. Breast, left inferior margin #2, excision: - Benign breast tissue  10. Breast, left lateral margin #2, excision: - Benign breast tissue  11. Axilla, left, sentinel lymph node: - Two lymph nodes negative for carcinoma (0/2) - No treatment effect identified  s/p Left breast adjuvant radiation 8/3/23. [de-identified] : She is s/p TC 2/2 12/16/22, treatment regimen switched to ACTHP secondary to decreased renal function.  She is s/p AC 2/2 2/3/23. S/p taxol 11/12.  C12 held because of worsening neuropathy.   Continues on herceptin/perjeta.    She states recently she develop pain in hands and feet, worse in morning when she wakes up.  She states she has h/o joint pain in hands and feet prior to her breast cancer diagnosis.  She states she had w/u done with rheumatologist that was negative.  She state pain resolved on own and now has come back last couple of weeks.  She denies any functional deficits.   Continues to c/o CIPN, (grade 1) of hands and feet, stable to slightly worse because of the joint pain she is experiencing.   Denies all other complaints.  She is s/p radiation therapy 8/3/23.   Notes a good appetite, stable weight and good performance status.  She is planning on going back to work.   Echo, 7/21/23- Visually estimated LVEF is 55-60%  Echo, 4/24/23- LVEF 55-60%

## 2023-08-04 NOTE — PHYSICAL EXAM
[Restricted in physically strenuous activity but ambulatory and able to carry out work of a light or sedentary nature] : Status 1- Restricted in physically strenuous activity but ambulatory and able to carry out work of a light or sedentary nature, e.g., light house work, office work [Normal] : affect appropriate [de-identified] : Thyromegaly, L>R [de-identified] : Right breast with no skin or nipple changes, no discrete palpable masses, no palpable axillary  nodes.  Left breast with well healed surgical scars, moderate erythema from radiation therapy present, with no blisters/sores present, no discrete palpable masses, no palpable axillary nodes.

## 2023-08-05 LAB
ALBUMIN SERPL ELPH-MCNC: 4.8 G/DL — SIGNIFICANT CHANGE UP (ref 3.3–5)
ALP SERPL-CCNC: 99 U/L — SIGNIFICANT CHANGE UP (ref 40–120)
ALT FLD-CCNC: 28 U/L — SIGNIFICANT CHANGE UP (ref 10–45)
ANION GAP SERPL CALC-SCNC: 17 MMOL/L — SIGNIFICANT CHANGE UP (ref 5–17)
AST SERPL-CCNC: 33 U/L — SIGNIFICANT CHANGE UP (ref 10–40)
BILIRUB SERPL-MCNC: <0.2 MG/DL — SIGNIFICANT CHANGE UP (ref 0.2–1.2)
BUN SERPL-MCNC: 23 MG/DL — SIGNIFICANT CHANGE UP (ref 7–23)
CALCIUM SERPL-MCNC: 10.1 MG/DL — SIGNIFICANT CHANGE UP (ref 8.4–10.5)
CHLORIDE SERPL-SCNC: 99 MMOL/L — SIGNIFICANT CHANGE UP (ref 96–108)
CO2 SERPL-SCNC: 25 MMOL/L — SIGNIFICANT CHANGE UP (ref 22–31)
CREAT SERPL-MCNC: 1.12 MG/DL — SIGNIFICANT CHANGE UP (ref 0.5–1.3)
EGFR: 58 ML/MIN/1.73M2 — LOW
ESTRADIOL FREE SERPL-MCNC: 8 PG/ML — SIGNIFICANT CHANGE UP
FSH SERPL-MCNC: 87.8 IU/L — SIGNIFICANT CHANGE UP
GLUCOSE SERPL-MCNC: 86 MG/DL — SIGNIFICANT CHANGE UP (ref 70–99)
LH SERPL-ACNC: 56.8 IU/L — SIGNIFICANT CHANGE UP
POTASSIUM SERPL-MCNC: 3.6 MMOL/L — SIGNIFICANT CHANGE UP (ref 3.5–5.3)
POTASSIUM SERPL-SCNC: 3.6 MMOL/L — SIGNIFICANT CHANGE UP (ref 3.5–5.3)
PROT SERPL-MCNC: 8.1 G/DL — SIGNIFICANT CHANGE UP (ref 6–8.3)
SODIUM SERPL-SCNC: 140 MMOL/L — SIGNIFICANT CHANGE UP (ref 135–145)

## 2023-08-08 ENCOUNTER — APPOINTMENT (OUTPATIENT)
Dept: PHYSICAL MEDICINE AND REHAB | Facility: CLINIC | Age: 53
End: 2023-08-08
Payer: COMMERCIAL

## 2023-08-08 VITALS
SYSTOLIC BLOOD PRESSURE: 150 MMHG | HEIGHT: 70 IN | WEIGHT: 175 LBS | HEART RATE: 97 BPM | RESPIRATION RATE: 18 BRPM | DIASTOLIC BLOOD PRESSURE: 97 MMHG | BODY MASS INDEX: 25.05 KG/M2

## 2023-08-08 DIAGNOSIS — Z82.61 FAMILY HISTORY OF ARTHRITIS: ICD-10-CM

## 2023-08-08 PROCEDURE — 99214 OFFICE O/P EST MOD 30 MIN: CPT | Mod: 25

## 2023-08-08 PROCEDURE — 93702 BIS XTRACELL FLUID ANALYSIS: CPT

## 2023-08-08 NOTE — REVIEW OF SYSTEMS
[Joint Pain] : joint pain [Joint Stiffness] : joint stiffness [Negative] : Heme/Lymph [FreeTextEntry9] : +joint pains  [de-identified] : numbness/tinging and burning pain in fingers/toes

## 2023-08-08 NOTE — END OF VISIT
[] : Fellow [FreeTextEntry3] : I have personally seen and examined the patient. I fully participated in the care of this patient. I have made amendments to the documentation where necessary, and agree with the history, physical exam, and plan as documented by the Fellow, Dr. Parra.

## 2023-08-08 NOTE — ASSESSMENT
[FreeTextEntry1] : 53-year-old female presenting for evaluation.  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 Discussed referral for re-evaluation with rheumatology given family history and last evaluation >3 years ago.   -Start Duloxetine 30 mg daily -Extensive discussion had and review with patient regarding potential interactions/allergy.  She reports hives from doxycycline in the past, I reviewed her allergies with 2 pharmacists, and they agree unlikely to have allergy.  She was instructed to monitor closely and report to ER if any allergic symptoms and she agreed to plan and would like to trial duloxetine.     #At risk for lymphedema: -No clinical signs of lymphedema on exam -Lymphedema education provided to patient -Tape Measurements were done today and no significant difference between upper extremities -Bioimpedance spectroscopy performed today with LUE -3.8 L-dex (post-operative baseline) -Next surveillance November 2023  -Follow-up in 1 month  The patient had a baseline SOZO measurement, which I reviewed today. The score is -3.8 . Bioimpedance spectroscopy helps identify the onset of lymphedema in an arm or leg before patients experience noticeable swelling. Research has shown that the early detection of lymphedema using L-Dex combined with treatment can reduce progression to chronic lymphedema by 95% in breast cancer patients. Whenever possible, patients are tested for baseline L-Dex score before cancer treatment begins and then are reassessed during regular follow-up visits using the SOZO device. Otherwise, this can be started postoperatively and continued during regular follow-up visits. If the patient's L-Dex score increases above normal levels, that is a sign that lymphedema is developing, and a referral is made to physical therapy for further evaluation and/or early compression treatment. Lymphedema assessment with the SOZO L-Dex score is recommended to be done every 3 months for the first 3 years and then every 6 months for years 4 and 5, followed by annually afterwards.

## 2023-08-08 NOTE — PHYSICAL EXAM
[FreeTextEntry1] : Gen: Patient is A&O x 3, NAD HEENT: EOMI, hearing grossly normal. Resp: regular, non - labored CV: pulses regular Skin: no rashes, erythema Lymph: no clubbing, cyanosis, edema in UE Inspection: no instability ROM: full throughout Palpation: no tenderness to palpation of bilateral wrists or axilla or chest or breast Sensation: decreased to light touch in distal fingertips and toes, No ring finger splitting   Reflexes: 1+ and symmetric throughout Strength: 5/5 throughout Special tests: -Arevalo. +Finklestein. -CMC grind test Gait: normal, non-antalgic  -Bioimpedance spectroscopy performed today with LUE -3.8 L-dex (post-operative baseline)  LUE: 15 cm above olecranon: 30 cm 10 cm above olecranon: 29 cm 10 cm below olecranon: 25.2 cm 15 cm below olecranon: 22.9 cm Wrist: 17.7 cm Dorsum of Hand: 19.9 cm Base of 2nd digit: 6.7 cm  RUE: 15 cm above olecranon: 30.9 cm 10 cm above olecranon: 29.5 cm 10 cm below olecranon: 26.0 cm 15 cm below olecranon: 21.5 cm Wrist: 17.6 cm Dorsum of Hand: 20.7 cm Base of 2nd digit: 7.1 cm

## 2023-08-08 NOTE — HISTORY OF PRESENT ILLNESS
[FreeTextEntry1] : 53 year old right-handed female with history of left breast invasive ductal carcinoma s/p TC 2/2 12/16/22, treatment regimen switched to ACTHP secondary to decreased renal function. s/p AC 2/2 2/3/23. s/p taxol 11/12. C12 held because of worsening neuropathy. Continues on herceptin/perjeta. Left breast lumpectomy/ SLND on 6/6/23 (2 left LNs) at [facility]. s/p Left breast adjuvant radiation completed on 8/3/23. Plan to start Anastrozole 3-4 weeks post-radiation completion.   She has numbness/tingling which started while on taxol. It developed into burning pain and finger swelling around 1 month ago. She iced hands/feet and received reflexology during treatment. She has generalized morning stiffness especially in her fingers that improve 1-2 hours after waking up. She had prior work-up by Rheumatology and it was unremarkable. She tried neuropathy oils that she found on [employer] and heat, but it only helps while doing it. Denies any focal weakness.    She notices difficulty with some ADLs such as putting on her bra or necklace due to the stiffness and neuropathy symptoms.    Denies any upper extremity swelling or heaviness. Denies any worsening neck/back pain from baseline. Denies any changes in bowels/bladder. Denies any groin numbness. Denies falls.

## 2023-08-09 ENCOUNTER — NON-APPOINTMENT (OUTPATIENT)
Age: 53
End: 2023-08-09

## 2023-08-11 ENCOUNTER — NON-APPOINTMENT (OUTPATIENT)
Age: 53
End: 2023-08-11

## 2023-08-23 ENCOUNTER — OUTPATIENT (OUTPATIENT)
Dept: OUTPATIENT SERVICES | Facility: HOSPITAL | Age: 53
LOS: 1 days | Discharge: HOME | End: 2023-08-23

## 2023-08-23 DIAGNOSIS — C50.919 MALIGNANT NEOPLASM OF UNSPECIFIED SITE OF UNSPECIFIED FEMALE BREAST: ICD-10-CM

## 2023-08-23 DIAGNOSIS — Z98.890 OTHER SPECIFIED POSTPROCEDURAL STATES: Chronic | ICD-10-CM

## 2023-08-25 ENCOUNTER — APPOINTMENT (OUTPATIENT)
Dept: INFUSION THERAPY | Facility: HOSPITAL | Age: 53
End: 2023-08-25

## 2023-08-28 DIAGNOSIS — Z51.11 ENCOUNTER FOR ANTINEOPLASTIC CHEMOTHERAPY: ICD-10-CM

## 2023-09-11 ENCOUNTER — APPOINTMENT (OUTPATIENT)
Dept: PHYSICAL MEDICINE AND REHAB | Facility: CLINIC | Age: 53
End: 2023-09-11
Payer: COMMERCIAL

## 2023-09-11 VITALS
DIASTOLIC BLOOD PRESSURE: 91 MMHG | BODY MASS INDEX: 25.77 KG/M2 | HEIGHT: 70 IN | WEIGHT: 180 LBS | SYSTOLIC BLOOD PRESSURE: 144 MMHG | RESPIRATION RATE: 12 BRPM | HEART RATE: 82 BPM

## 2023-09-11 PROCEDURE — 99214 OFFICE O/P EST MOD 30 MIN: CPT

## 2023-09-12 ENCOUNTER — APPOINTMENT (OUTPATIENT)
Dept: HEMATOLOGY ONCOLOGY | Facility: CLINIC | Age: 53
End: 2023-09-12
Payer: COMMERCIAL

## 2023-09-12 VITALS
OXYGEN SATURATION: 97 % | SYSTOLIC BLOOD PRESSURE: 126 MMHG | WEIGHT: 177.25 LBS | RESPIRATION RATE: 14 BRPM | DIASTOLIC BLOOD PRESSURE: 85 MMHG | TEMPERATURE: 97 F | BODY MASS INDEX: 25.43 KG/M2 | HEART RATE: 81 BPM

## 2023-09-12 PROCEDURE — 99214 OFFICE O/P EST MOD 30 MIN: CPT

## 2023-09-15 ENCOUNTER — APPOINTMENT (OUTPATIENT)
Dept: INFUSION THERAPY | Facility: HOSPITAL | Age: 53
End: 2023-09-15

## 2023-10-06 ENCOUNTER — APPOINTMENT (OUTPATIENT)
Dept: INFUSION THERAPY | Facility: HOSPITAL | Age: 53
End: 2023-10-06

## 2023-10-24 ENCOUNTER — OUTPATIENT (OUTPATIENT)
Dept: OUTPATIENT SERVICES | Facility: HOSPITAL | Age: 53
LOS: 1 days | Discharge: HOME | End: 2023-10-24

## 2023-10-24 DIAGNOSIS — C50.919 MALIGNANT NEOPLASM OF UNSPECIFIED SITE OF UNSPECIFIED FEMALE BREAST: ICD-10-CM

## 2023-10-24 DIAGNOSIS — Z98.890 OTHER SPECIFIED POSTPROCEDURAL STATES: Chronic | ICD-10-CM

## 2023-10-27 ENCOUNTER — APPOINTMENT (OUTPATIENT)
Dept: INFUSION THERAPY | Facility: HOSPITAL | Age: 53
End: 2023-10-27

## 2023-10-27 ENCOUNTER — APPOINTMENT (OUTPATIENT)
Dept: HEMATOLOGY ONCOLOGY | Facility: CLINIC | Age: 53
End: 2023-10-27
Payer: COMMERCIAL

## 2023-10-27 VITALS
BODY MASS INDEX: 26.03 KG/M2 | RESPIRATION RATE: 15 BRPM | DIASTOLIC BLOOD PRESSURE: 87 MMHG | WEIGHT: 181.44 LBS | TEMPERATURE: 97.3 F | HEART RATE: 85 BPM | SYSTOLIC BLOOD PRESSURE: 138 MMHG | OXYGEN SATURATION: 99 %

## 2023-10-27 PROCEDURE — 99214 OFFICE O/P EST MOD 30 MIN: CPT

## 2023-10-29 ENCOUNTER — NON-APPOINTMENT (OUTPATIENT)
Age: 53
End: 2023-10-29

## 2023-10-30 DIAGNOSIS — Z51.11 ENCOUNTER FOR ANTINEOPLASTIC CHEMOTHERAPY: ICD-10-CM

## 2023-11-03 ENCOUNTER — NON-APPOINTMENT (OUTPATIENT)
Age: 53
End: 2023-11-03

## 2023-11-04 NOTE — REASON FOR VISIT
"Subjective Data:  \"This is the best I have felt\" still has fevers and ibuprofen breaks fevers.     - repeat limited TTE this am: CONCLUSIONS: 1. Left ventricular systolic function is normal with a 55% estimated ejection fraction.  2. There is a moderate pericardial effusion.  3. There is a moderate pleural effusion.  4. There is no evidence of cardiac tamponade.   - per IC continue pigtail to drain, was irrigated.   - Rheumatoid factor and ANCA  - change metoprolol tartrate to succinate 50mg daily  - plan for limited echo 48 hours after drain pulled  - increase tylenol to 975mg to help with pain         Overnight Events:    None      Objective Data:  Last Recorded Vitals:  Vitals:    11/04/23 0421 11/04/23 0827 11/04/23 1100 11/04/23 1500   BP: 134/76 145/79 136/74 132/79   Pulse: 87 99 77 64   Resp: 17 18 18 18   Temp: 36.3 °C (97.3 °F) 37.4 °C (99.3 °F) 36.3 °C (97.4 °F)    TempSrc: Temporal      SpO2: 92% 96% 96% 94%   Weight: 106 kg (234 lb 2.1 oz)      Height:           Last Labs:  CBC - 11/4/2023:  6:12 AM  8.6 10.9 386    34.7      CMP - 11/4/2023:  6:12 AM  8.7 6.3 17 --- 0.6   4.8 3.2 30 69      PTT - No results in last year.  _   _ _     TROPHS   Date/Time Value Ref Range Status   10/31/2023 05:50 PM <3 0 - 20 ng/L Final   10/24/2023 08:04 PM 55 0 - 20 ng/L Final     Comment:     Previous result verified on 10/24/2023 1138 on specimen/case [id number] called with component [facility] for procedure Troponin I, High Sensitivity with value 84 ng/L.   10/24/2023 04:22 PM 46 0 - 20 ng/L Final     LDLCALC   Date/Time Value Ref Range Status   10/24/2023 04:22 PM 86 <=99 mg/dL Final     Comment:                                 Near   Borderline      AGE      Desirable  Optimal    High     High     Very High     0-19 Y     0 - 109     ---    110-129   >/= 130     ----    20-24 Y     0 - 119     ---    120-159   >/= 160     ----      >24 Y     0 -  99   100-129  130-159   160-189     >/=190       VLDL   Date/Time " Value Ref Range Status   10/24/2023 04:22 PM 15 0 - 40 mg/dL Final      Last I/O:  I/O last 3 completed shifts:  In: 840 (7.9 mL/kg) [P.O.:840]  Out: 35 (0.3 mL/kg) [Drains:35]  Weight: 106.2 kg     Past Cardiology Tests (Last 3 Years):  EKG:  ECG 12 Lead 10/26/2023      ECG 12 Lead 10/26/2023    Echo:  Transthoracic echo (TTE) limited 11/1/2023      Transthoracic Echo (TTE) Complete 10/25/2023    Ejection Fractions:  EF   Date/Time Value Ref Range Status   11/04/2023 01:13 PM 52       Cath:  No results found for this or any previous visit from the past 1095 days.    Stress Test:  No results found for this or any previous visit from the past 1095 days.    Cardiac Imaging:  No results found for this or any previous visit from the past 1095 days.      Inpatient Medications:  Scheduled medications   Medication Dose Route Frequency    acetaminophen  975 mg oral q6h    Or    acetaminophen  650 mg nasogastric tube q6h    Or    acetaminophen  650 mg rectal q6h    colchicine (gout)  0.6 mg oral BID    ibuprofen  600 mg oral TID    [START ON 11/5/2023] metoprolol succinate XL  50 mg oral Daily    metoprolol tartrate  25 mg oral BID    pantoprazole  40 mg oral Daily before breakfast     PRN medications   Medication    oxyCODONE-acetaminophen     Continuous Medications   Medication Dose Last Rate       Physical Exam:  GEN: laying in bed, NAD  HEENT: unable to appreciate JVD   Skin: warm and clammy   CV: RRR  Pulm: CTAB  Abdomen: soft, NT  Ext: no LE edema   Neuro: A/Ox3  Psych: appropriate     Assessment/Plan   Pt is a 30 yo previously healthy gentleman who developed a cold-like illness in October of 2023 c/b pericarditis. He is admitted a second time due to fevers, night sweats, and chest discomfort. Clinically suspect his symptoms are related to pericarditis that is ongoing    Pericarditis, continued episode  - Initially diagnosed 10/24-10/26/23 at [facility] where echo 10/25 completed showed an ejection fraction of 50 to  55% with moderate to severe pericardial effusion. Treated with PPI, colchicine and ibuprofen.   - Back to ED 10/31/23 for L shoulder pain, left chest discomfort,  and fever X2 days.   - Echo 10/31 showed large pericardial effusion  - cont colchicine 0.6 BID, ibuprofen 600 TID, metop tartrate  - protonix for GI protection  - may need to consider steroids should pain recur, balanced with risk of recurrence  - viral panel negative thus far, CTA PE without e/o of pulmonary embolism  - s/p periocardiocentesis 11/2 for ~350cc, drain in place   - pericardial fluid labs pending   - repeat limited TTE 11/4: CONCLUSIONS: 1. Left ventricular systolic function is normal with a 55% estimated ejection fraction.  2. There is a moderate pericardial effusion.  3. There is a moderate pleural effusion.  4. There is no evidence of cardiac tamponade. Continue draining.   - plan for limited echo in 48 hours after drain removed  - Increase tylenol to 975mg q6 to help with pain of drain     Paroxysmal atrial fibrillation   - Initially diagnosed afib RVR (up to 179bpm) 10/24/23 at time of pericarditis, spontaneously converted to SR  - change metoprolol tartrate to succinate for patient convenience  - currently NSR   - [id number] score of 0  - TSH normal     Leukocytosis   - X-ray 10/31 some pulm bibasilar atelectasis/pulmonary edema with questionable left lower lobe infiltrate, CT angiography showed questionable lower lobe infiltrate, therefore added doxycycline to vancomycin/Zosyn for atypical coverage, did also obtain urine Legionella/pneumococcus antigen testing.   - stopped doxycycline at  as his legionella and strep was negative; unlikely an atypical pneumonia  - admit WBC 13,000  - Troponin negative, low suspicion for myocarditis   - bld cx x2 10/31 NGTD x2  - 11/3 temp 38 @ 2000, then again 0400 treated with tylenol   - 11/4 tmax 100.4  - MRSA negative  - Infectious disease consult, labs pending       Dispo  pending eval of  pericardial effusion/pericarditis       Seen and discussed with Dr. Juarez       Code Status:  Full Code    I spent 40 minutes in the professional and overall care of this patient.        Nubia Washington, APRN-CNP   [Follow-Up Visit] : a follow-up [Urgent Visit] : an urgent  [Spouse] : spouse [FreeTextEntry2] : Breast Cancer

## 2023-11-13 ENCOUNTER — APPOINTMENT (OUTPATIENT)
Dept: PHYSICAL MEDICINE AND REHAB | Facility: CLINIC | Age: 53
End: 2023-11-13
Payer: COMMERCIAL

## 2023-11-13 VITALS
DIASTOLIC BLOOD PRESSURE: 81 MMHG | SYSTOLIC BLOOD PRESSURE: 129 MMHG | HEART RATE: 76 BPM | HEIGHT: 70 IN | RESPIRATION RATE: 14 BRPM | WEIGHT: 180 LBS | BODY MASS INDEX: 25.77 KG/M2

## 2023-11-13 PROCEDURE — 99214 OFFICE O/P EST MOD 30 MIN: CPT

## 2023-11-17 ENCOUNTER — APPOINTMENT (OUTPATIENT)
Dept: INFUSION THERAPY | Facility: HOSPITAL | Age: 53
End: 2023-11-17

## 2023-11-28 ENCOUNTER — NON-APPOINTMENT (OUTPATIENT)
Age: 53
End: 2023-11-28

## 2023-12-08 ENCOUNTER — APPOINTMENT (OUTPATIENT)
Dept: HEMATOLOGY ONCOLOGY | Facility: CLINIC | Age: 53
End: 2023-12-08
Payer: COMMERCIAL

## 2023-12-08 ENCOUNTER — RESULT REVIEW (OUTPATIENT)
Age: 53
End: 2023-12-08

## 2023-12-08 ENCOUNTER — APPOINTMENT (OUTPATIENT)
Dept: INFUSION THERAPY | Facility: HOSPITAL | Age: 53
End: 2023-12-08

## 2023-12-08 VITALS
RESPIRATION RATE: 17 BRPM | WEIGHT: 180.56 LBS | SYSTOLIC BLOOD PRESSURE: 126 MMHG | OXYGEN SATURATION: 98 % | HEART RATE: 78 BPM | DIASTOLIC BLOOD PRESSURE: 82 MMHG | TEMPERATURE: 98.1 F | BODY MASS INDEX: 25.91 KG/M2

## 2023-12-08 PROCEDURE — 99214 OFFICE O/P EST MOD 30 MIN: CPT

## 2023-12-09 LAB
APTT BLD: 27.9 SEC — SIGNIFICANT CHANGE UP (ref 24.5–35.6)
APTT BLD: 27.9 SEC — SIGNIFICANT CHANGE UP (ref 24.5–35.6)
INR BLD: 0.95 RATIO — SIGNIFICANT CHANGE UP (ref 0.85–1.18)
INR BLD: 0.95 RATIO — SIGNIFICANT CHANGE UP (ref 0.85–1.18)
PROTHROM AB SERPL-ACNC: 10.9 SEC — SIGNIFICANT CHANGE UP (ref 9.5–13)
PROTHROM AB SERPL-ACNC: 10.9 SEC — SIGNIFICANT CHANGE UP (ref 9.5–13)

## 2023-12-12 ENCOUNTER — RESULT REVIEW (OUTPATIENT)
Age: 53
End: 2023-12-12

## 2023-12-12 ENCOUNTER — APPOINTMENT (OUTPATIENT)
Dept: HEMATOLOGY ONCOLOGY | Facility: CLINIC | Age: 53
End: 2023-12-12
Payer: COMMERCIAL

## 2023-12-12 VITALS
OXYGEN SATURATION: 97 % | HEART RATE: 81 BPM | DIASTOLIC BLOOD PRESSURE: 87 MMHG | RESPIRATION RATE: 16 BRPM | SYSTOLIC BLOOD PRESSURE: 129 MMHG | WEIGHT: 180 LBS | BODY MASS INDEX: 25.83 KG/M2 | TEMPERATURE: 97.3 F

## 2023-12-12 DIAGNOSIS — Z79.899 OTHER LONG TERM (CURRENT) DRUG THERAPY: ICD-10-CM

## 2023-12-12 LAB
BASOPHILS # BLD AUTO: 0.04 K/UL — SIGNIFICANT CHANGE UP (ref 0–0.2)
BASOPHILS # BLD AUTO: 0.04 K/UL — SIGNIFICANT CHANGE UP (ref 0–0.2)
BASOPHILS NFR BLD AUTO: 0.7 % — SIGNIFICANT CHANGE UP (ref 0–2)
BASOPHILS NFR BLD AUTO: 0.7 % — SIGNIFICANT CHANGE UP (ref 0–2)
EOSINOPHIL # BLD AUTO: 0.19 K/UL — SIGNIFICANT CHANGE UP (ref 0–0.5)
EOSINOPHIL # BLD AUTO: 0.19 K/UL — SIGNIFICANT CHANGE UP (ref 0–0.5)
EOSINOPHIL NFR BLD AUTO: 3.4 % — SIGNIFICANT CHANGE UP (ref 0–6)
EOSINOPHIL NFR BLD AUTO: 3.4 % — SIGNIFICANT CHANGE UP (ref 0–6)
HCT VFR BLD CALC: 30.1 % — LOW (ref 34.5–45)
HCT VFR BLD CALC: 30.1 % — LOW (ref 34.5–45)
HGB BLD-MCNC: 10.7 G/DL — LOW (ref 11.5–15.5)
HGB BLD-MCNC: 10.7 G/DL — LOW (ref 11.5–15.5)
IMM GRANULOCYTES NFR BLD AUTO: 0.4 % — SIGNIFICANT CHANGE UP (ref 0–0.9)
IMM GRANULOCYTES NFR BLD AUTO: 0.4 % — SIGNIFICANT CHANGE UP (ref 0–0.9)
LYMPHOCYTES # BLD AUTO: 1.41 K/UL — SIGNIFICANT CHANGE UP (ref 1–3.3)
LYMPHOCYTES # BLD AUTO: 1.41 K/UL — SIGNIFICANT CHANGE UP (ref 1–3.3)
LYMPHOCYTES # BLD AUTO: 25.3 % — SIGNIFICANT CHANGE UP (ref 13–44)
LYMPHOCYTES # BLD AUTO: 25.3 % — SIGNIFICANT CHANGE UP (ref 13–44)
MCHC RBC-ENTMCNC: 30.9 PG — SIGNIFICANT CHANGE UP (ref 27–34)
MCHC RBC-ENTMCNC: 30.9 PG — SIGNIFICANT CHANGE UP (ref 27–34)
MCHC RBC-ENTMCNC: 35.5 G/DL — SIGNIFICANT CHANGE UP (ref 32–36)
MCHC RBC-ENTMCNC: 35.5 G/DL — SIGNIFICANT CHANGE UP (ref 32–36)
MCV RBC AUTO: 87 FL — SIGNIFICANT CHANGE UP (ref 80–100)
MCV RBC AUTO: 87 FL — SIGNIFICANT CHANGE UP (ref 80–100)
MONOCYTES # BLD AUTO: 0.76 K/UL — SIGNIFICANT CHANGE UP (ref 0–0.9)
MONOCYTES # BLD AUTO: 0.76 K/UL — SIGNIFICANT CHANGE UP (ref 0–0.9)
MONOCYTES NFR BLD AUTO: 13.6 % — SIGNIFICANT CHANGE UP (ref 2–14)
MONOCYTES NFR BLD AUTO: 13.6 % — SIGNIFICANT CHANGE UP (ref 2–14)
NEUTROPHILS # BLD AUTO: 3.15 K/UL — SIGNIFICANT CHANGE UP (ref 1.8–7.4)
NEUTROPHILS # BLD AUTO: 3.15 K/UL — SIGNIFICANT CHANGE UP (ref 1.8–7.4)
NEUTROPHILS NFR BLD AUTO: 56.6 % — SIGNIFICANT CHANGE UP (ref 43–77)
NEUTROPHILS NFR BLD AUTO: 56.6 % — SIGNIFICANT CHANGE UP (ref 43–77)
NRBC # BLD: 0 /100 WBCS — SIGNIFICANT CHANGE UP (ref 0–0)
NRBC # BLD: 0 /100 WBCS — SIGNIFICANT CHANGE UP (ref 0–0)
PLATELET # BLD AUTO: 237 K/UL — SIGNIFICANT CHANGE UP (ref 150–400)
PLATELET # BLD AUTO: 237 K/UL — SIGNIFICANT CHANGE UP (ref 150–400)
RBC # BLD: 3.46 M/UL — LOW (ref 3.8–5.2)
RBC # BLD: 3.46 M/UL — LOW (ref 3.8–5.2)
RBC # FLD: 12.8 % — SIGNIFICANT CHANGE UP (ref 10.3–14.5)
RBC # FLD: 12.8 % — SIGNIFICANT CHANGE UP (ref 10.3–14.5)
WBC # BLD: 5.57 K/UL — SIGNIFICANT CHANGE UP (ref 3.8–10.5)
WBC # BLD: 5.57 K/UL — SIGNIFICANT CHANGE UP (ref 3.8–10.5)
WBC # FLD AUTO: 5.57 K/UL — SIGNIFICANT CHANGE UP (ref 3.8–10.5)
WBC # FLD AUTO: 5.57 K/UL — SIGNIFICANT CHANGE UP (ref 3.8–10.5)

## 2023-12-12 PROCEDURE — 99214 OFFICE O/P EST MOD 30 MIN: CPT

## 2023-12-13 ENCOUNTER — OUTPATIENT (OUTPATIENT)
Dept: OUTPATIENT SERVICES | Facility: HOSPITAL | Age: 53
LOS: 1 days | Discharge: HOME | End: 2023-12-13

## 2023-12-13 DIAGNOSIS — Z98.890 OTHER SPECIFIED POSTPROCEDURAL STATES: Chronic | ICD-10-CM

## 2023-12-13 DIAGNOSIS — C50.919 MALIGNANT NEOPLASM OF UNSPECIFIED SITE OF UNSPECIFIED FEMALE BREAST: ICD-10-CM

## 2023-12-13 PROBLEM — Z79.899 ON ANTINEOPLASTIC CHEMOTHERAPY: Status: ACTIVE | Noted: 2022-12-16

## 2023-12-13 NOTE — HISTORY OF PRESENT ILLNESS
[de-identified] : The patient reports having had cosmetic surgery to remove "fat from my axilla," with the patient noting that this was "extra breast tissue."  She had a first procedure in the right axilla approximately 15 years ago with pathology returning negative per patient reporting, and more recently had a similar procedure in her left axilla, "left armpit fat," with the patient also noting that the pathology returned negative.  I do not have a copy of either of these pathology reports.  The patient reports she was not happy with the result, noting that there seemed to be residual tissue in the left axilla.  She kept palpating this area.  She ultimately felt a lump in the left breast, but not near the armpit.  She returned to her plastic surgeon, who palpated the same and noted this was "definitely something else."  Consequently, she was referred for a diagnostic mammogram on 10/10/2022 (with her last prior mammogram approximately 1-2 years earlier).  The right mammogram returned unremarkable; in the upper left breast, middle depth, there was an area of subtle architectural distortion; there was also stable benign-appearing mass in the central left breast at the middle to posterior depth; there were bilateral benign-appearing calcifications.  An ultrasound performed on that same date in the left breast demonstrated left breast 1 o'clock axis 8 centimeters from nipple, likely corresponding to the subtle distortion seen mammographically, there was an 8 x 8 x 8 millimeter irregular hypoechoic mass, which appeared to be associated with some subtle sonographic distortion with ultrasound-guided core biopsy recommended; in the left breast 5 o'clock axis 3 centimeters from nipple, where the patient felt a lump, there was a vague 15 x 6 x 10 millimeter irregular hypoechoic mass; there was no suspicious sonographic findings in the left axilla.  The patient ultimately went on to have an ultrasound-guided core biopsy on 10/14/2022 of the left breast 1 o'clock lesion with a finding of invasive ductal carcinoma, moderately differentiated, estrogen receptor positive, progesterone receptor positive and HER-2/michelle negative; ultrasound-guided biopsy of left breast 5 o'clock lesion showing invasive ductal carcinoma, moderately differentiated, progesterone receptor positive, HER-2/michelle positive.  There was a comment that the 2 lesions were 5.4 centimeters apart, and a pretreatment breast MRI was recommended.  The patient then went on to see Dr. [place] and a bilateral breast MRI was performed on 10/20/2022 with findings of a mass and non-mass enhancement surrounding the biopsy marker in the upper outer breast middle to posterior depth spanning up to 3.5 centimeters x 2.5 centimeters corresponding to the site of newly diagnosed malignancy; there was a mass and non-mass enhancement in the lower outer left breast 5 o'clock position anterior to middle depth, spanning 3.7 x 2.0 centimeters surrounding a biopsy marker; the enhancement was fairly superficial in location, part of which was located in subcutaneous fat without overlying skin enhancement; there was a 6 millimeter focus of irregular, superficial, non-mass enhancement in the lower outer anterior breast located approximately 1.3-1.4 centimeters from nipple within contiguous non-mass enhancement posterior to the index carcinoma at the 5 o'clock position; no axillary right adenopathy was noted; no significant internal mammary adenopathy was noted; there were 2 adjacent prominent level 1 lymph nodes with possible thickened cortex measuring up to 1.5 centimeters in length in the left axilla with no overlying skin thickening or edema in the axilla.  The patient reports being scheduled for a biopsy of the left axillary lymph nodes within the next several days.  The patient is seen today in consultation regarding further treatment recommendations.  She notes a good appetite, stable weight, and excellent performance status.  A review of 10 systems is remarkable for anxiety since her breast cancer diagnosis; she has a history of chronic aches in her feet, hands, and right hip for which an extensive prior workups were negative; she has seen rheumatologist for multiple tests, which all returned negative per patient's report.  She is s/p 2 of TCHP, tolerated fairly well except that on the date of treatment her creat had increased (prev 0.6 to 1.1 on date if treatment) but with the carbo dose based on pre-treatment creatinine. She had follow up BMPs with creat as high as 1.56  in the intertreatment interval. Pt receive IVF wit gradual improvement but not back to baseline. TCHP cycle 2 held / delayed but creat did not recover yet to baseline. Spoke with pt and recommended switching to ACTHP instead - she agreed.   She is s/p TC 2/2 12/16/22, treatment regimen switched to ACTHP secondary to decreased renal function.  She is s/p AC 2/2 2/3/23. S/p taxol 11/12.  C12 held because of worsening neuropathy.   On 6/5/23 had a LLE / SLND with findings:  Specimen(s) Submitted 1-Left lumpectomy 1:00 2-Left superior # 1 3-Left medial # 1 4-Left inferior # 1 5-Left lateral # 1 6-Left lumpectomy 5:00 7-Left superior # 2 8-Left medial # 2 9-Left inferior # 2 10-Left lateral # 2 11-Left sentinel lymph node.   Final Diagnosis 1. Breast, left 1 o'clock, lumpectomy: - No residual carcinoma, status post neoadjuvant therapy - Benign breast tissue with duct ectasia - Biopsy site and tumor bed changes - Please see Synoptic Summary  2. Breast, left superior margin #1, excision: - Benign breast tissue  3. Breast, left medial margin #1, excision: - Benign breast tissue with calcifications  4. Breast, left inferior margin #1, excision: - Benign breast tissue  5. Breast, left lateral margin #1, excision: - Benign breast tissue with cysts, calcifications, and reactive myofibroblasts  Comment: Select slides were shown in intradepartmental consultation with agreement of the diagnosis.  6. Breast, left 5 o'clock, lumpectomy: - No residual carcinoma, status post neoadjuvant therapy - Benign breast tissue with calcifications - Biopsy site and tumor bed changes - Please see Synoptic Summary  7. Breast, left superior margin #2, excision: - Benign breast tissue  8. Breast, left medial margin #2, excision: - Benign breast tissue  9. Breast, left inferior margin #2, excision: - Benign breast tissue  10. Breast, left lateral margin #2, excision: - Benign breast tissue  11. Axilla, left, sentinel lymph node: - Two lymph nodes negative for carcinoma (0/2) - No treatment effect identified  s/p Left breast adjuvant radiation 8/3/23.  Stared anastrozole on 8/26/23.   [de-identified] : She is s/p TCHP 2 12/16/22, treatment regimen switched to ACTHP secondary to decreased renal function.  She is s/p AC 2/3/23. S/p taxol 11/12.  C12 held because of worsening neuropathy.   She is s/p radiation therapy 8/3/23.   Stared anastrozole on 8/26/23.  S/P HP 16/17 on 12 / 8/23 with last planned on 12/29/23.    She continues to c/o ongoing arthralgia, worse in am then improves as day goes on.   She is followed by pain management and is currently on Cymbalta, which is helping.  Still experiencing pain, however improved.   Has mild HF, which she states are tolerable.   She denies any other treatment related or other complaints.    Has pre treatment baseline arthralgias of L shoulder R wrist   Notes a good appetite, stable weight and good performance status.  She is back to work teaching.   Echo, 10/5/23- Visually estimated LVEF is 55-60%

## 2023-12-13 NOTE — PHYSICAL EXAM
[Fully active, able to carry on all pre-disease performance without restriction] : Status 0 - Fully active, able to carry on all pre-disease performance without restriction [Normal] : affect appropriate [de-identified] : Thyromegaly, L>R w/o change [de-identified] : Right breast with no skin or nipple changes, no discrete palpable masses, no palpable axillary  nodes.  Left breast with well healed surgical scars, no discrete palpable masses, no palpable axillary nodes.

## 2023-12-18 ENCOUNTER — APPOINTMENT (OUTPATIENT)
Dept: SURGERY | Facility: CLINIC | Age: 53
End: 2023-12-18
Payer: COMMERCIAL

## 2023-12-18 PROCEDURE — 99213K: CUSTOM

## 2023-12-26 ENCOUNTER — APPOINTMENT (OUTPATIENT)
Dept: RADIOLOGY | Facility: CLINIC | Age: 53
End: 2023-12-26
Payer: COMMERCIAL

## 2023-12-26 ENCOUNTER — OUTPATIENT (OUTPATIENT)
Dept: OUTPATIENT SERVICES | Facility: HOSPITAL | Age: 53
LOS: 1 days | End: 2023-12-26
Payer: COMMERCIAL

## 2023-12-26 DIAGNOSIS — Z00.8 ENCOUNTER FOR OTHER GENERAL EXAMINATION: ICD-10-CM

## 2023-12-26 DIAGNOSIS — C50.912 MALIGNANT NEOPLASM OF UNSPECIFIED SITE OF LEFT FEMALE BREAST: ICD-10-CM

## 2023-12-26 DIAGNOSIS — Z98.890 OTHER SPECIFIED POSTPROCEDURAL STATES: Chronic | ICD-10-CM

## 2023-12-26 PROCEDURE — 77080 DXA BONE DENSITY AXIAL: CPT | Mod: 26

## 2023-12-26 PROCEDURE — 77080 DXA BONE DENSITY AXIAL: CPT

## 2023-12-27 ENCOUNTER — LABORATORY RESULT (OUTPATIENT)
Age: 53
End: 2023-12-27

## 2023-12-27 ENCOUNTER — APPOINTMENT (OUTPATIENT)
Dept: PHYSICAL MEDICINE AND REHAB | Facility: CLINIC | Age: 53
End: 2023-12-27
Payer: COMMERCIAL

## 2023-12-27 VITALS
DIASTOLIC BLOOD PRESSURE: 76 MMHG | RESPIRATION RATE: 16 BRPM | HEIGHT: 70 IN | OXYGEN SATURATION: 98 % | BODY MASS INDEX: 25.91 KG/M2 | TEMPERATURE: 97.8 F | WEIGHT: 181 LBS | HEART RATE: 81 BPM | SYSTOLIC BLOOD PRESSURE: 118 MMHG

## 2023-12-27 DIAGNOSIS — Z00.00 ENCOUNTER FOR GENERAL ADULT MEDICAL EXAMINATION W/OUT ABNORMAL FINDINGS: ICD-10-CM

## 2023-12-27 PROCEDURE — 81003 URINALYSIS AUTO W/O SCOPE: CPT | Mod: QW

## 2023-12-27 PROCEDURE — 99396 PREV VISIT EST AGE 40-64: CPT | Mod: 25

## 2023-12-27 PROCEDURE — G0444 DEPRESSION SCREEN ANNUAL: CPT | Mod: 33,59

## 2023-12-27 PROCEDURE — 36410 VNPNXR 3YR/> PHY/QHP DX/THER: CPT

## 2023-12-27 RX ORDER — TRIAMCINOLONE ACETONIDE 1 MG/G
0.1 OINTMENT TOPICAL
Qty: 1 | Refills: 1 | Status: DISCONTINUED | COMMUNITY
Start: 2023-05-02 | End: 2023-12-27

## 2023-12-27 RX ORDER — METOCLOPRAMIDE 10 MG/1
10 TABLET ORAL EVERY 8 HOURS
Qty: 30 | Refills: 1 | Status: DISCONTINUED | COMMUNITY
Start: 2022-12-21 | End: 2023-12-27

## 2023-12-27 RX ORDER — METRONIDAZOLE 7.5 MG/G
0.75 CREAM TOPICAL
Qty: 1 | Refills: 2 | Status: DISCONTINUED | COMMUNITY
Start: 2023-05-02 | End: 2023-12-27

## 2023-12-27 RX ORDER — PROCHLORPERAZINE MALEATE 10 MG/1
10 TABLET ORAL EVERY 6 HOURS
Qty: 30 | Refills: 1 | Status: DISCONTINUED | COMMUNITY
Start: 2022-11-15 | End: 2023-12-27

## 2023-12-27 RX ORDER — FAMOTIDINE 20 MG/1
20 TABLET, FILM COATED ORAL
Qty: 30 | Refills: 0 | Status: DISCONTINUED | COMMUNITY
Start: 2022-01-27 | End: 2023-12-27

## 2023-12-27 RX ORDER — DULOXETINE HYDROCHLORIDE 20 MG/1
20 CAPSULE, DELAYED RELEASE PELLETS ORAL DAILY
Qty: 60 | Refills: 1 | Status: DISCONTINUED | COMMUNITY
Start: 2023-09-11 | End: 2023-12-27

## 2023-12-27 RX ORDER — LIDOCAINE AND PRILOCAINE 25; 25 MG/G; MG/G
2.5-2.5 CREAM TOPICAL
Qty: 1 | Refills: 2 | Status: DISCONTINUED | COMMUNITY
Start: 2022-11-15 | End: 2023-12-27

## 2023-12-27 RX ORDER — DEXAMETHASONE 4 MG/1
4 TABLET ORAL
Qty: 32 | Refills: 0 | Status: DISCONTINUED | COMMUNITY
Start: 2022-11-15 | End: 2023-12-27

## 2023-12-27 RX ORDER — PIMECROLIMUS 10 MG/G
1 CREAM TOPICAL
Qty: 1 | Refills: 3 | Status: DISCONTINUED | COMMUNITY
Start: 2023-05-02 | End: 2023-12-27

## 2023-12-27 RX ORDER — METOPROLOL TARTRATE 50 MG/1
50 TABLET, FILM COATED ORAL
Qty: 180 | Refills: 1 | Status: ACTIVE | COMMUNITY
Start: 2022-11-08

## 2023-12-27 RX ORDER — DULOXETINE HYDROCHLORIDE 30 MG/1
30 CAPSULE, DELAYED RELEASE PELLETS ORAL
Qty: 30 | Refills: 1 | Status: DISCONTINUED | COMMUNITY
Start: 2023-08-08 | End: 2023-12-27

## 2023-12-27 NOTE — HISTORY OF PRESENT ILLNESS
[FreeTextEntry1] : annual wellness visit  [de-identified] : Patient presents today for physical exam. Her last PE was over a year ago. She finished chemotherapy in April, surgery in May, radiation in July, and antibody treatment earlier this month and is cancer free. Pt reports subsequent neuropathy of hands and feet. Pt reports h/o breast ca stage I with no lymph node involvement, removed with surgery. Pt underwent precautionary chest CT and pelvic US. She continues to take Anastrozole. She does report ongoing joint pain. During treatment, pt reports renal complications which have resolved. Pt has followed with nephrology. Denies any CP, SOB or diff breathing. Denies abdominal pain, blood in stool, or changes in bowel habits. Denies any fever, chills, cough or cold type symptoms. She has been following with cardiology including EKG and echo every 3 months due to antibody treatment, due for visit in January. Has no other complaints at this time.

## 2023-12-27 NOTE — HEALTH RISK ASSESSMENT
[0] : 2) Feeling down, depressed, or hopeless: Not at all (0) [PHQ-9 Negative - No further assessment needed] : PHQ-9 Negative - No further assessment needed [Patient reported colonoscopy was normal] : Patient reported colonoscopy was normal [ColonoscopyDate] : 01/2019

## 2023-12-27 NOTE — PLAN
[FreeTextEntry1] :  Labs Drawn by Dr. Ceferino Reis due to poor venous access.  Patient required lab testing due to conditions in their past medical history requiring periodic monitoring.  Labs were sent to [facility].  Discussed and reviewed medications with patient as follows; Anastrozole- h/o breast ca,  Amlodipine and Metoprolol- HTN, Rosuvastatin- HLD, Duloxetine- neuropathy Patient to continue with present medications - all medications reconciled/reviewed during this visit and listed above.  Increase fluid intake.  RTO in 7-10 days for re-evaluation.   I, Deja Sanchez, attest that this documentation has been prepared under the direction and in the presence of Provider Ceferino Reis DNP The documentation recorded by the scribe, in my presence, accurately reflects the service I personally performed, and the decisions made by me with my edits as appropriate. Ceferino Reis DNP  Z Plasty Text: The lesion was extirpated to the level of the fat with a #15 scalpel blade.  Given the location of the defect, shape of the defect and the proximity to free margins a Z-plasty was deemed most appropriate for repair.  Using a sterile surgical marker, the appropriate transposition arms of the Z-plasty were drawn incorporating the defect and placing the expected incisions within the relaxed skin tension lines where possible.    The area thus outlined was incised deep to adipose tissue with a #15 scalpel blade.  The skin margins were undermined to an appropriate distance in all directions utilizing iris scissors.  The opposing transposition arms were then transposed into place in opposite direction and anchored with interrupted buried subcutaneous sutures.

## 2023-12-29 ENCOUNTER — APPOINTMENT (OUTPATIENT)
Dept: INFUSION THERAPY | Facility: HOSPITAL | Age: 53
End: 2023-12-29

## 2024-01-02 ENCOUNTER — NON-APPOINTMENT (OUTPATIENT)
Age: 54
End: 2024-01-02

## 2024-01-02 ENCOUNTER — APPOINTMENT (OUTPATIENT)
Dept: PHYSICAL MEDICINE AND REHAB | Facility: CLINIC | Age: 54
End: 2024-01-02
Payer: COMMERCIAL

## 2024-01-02 VITALS
DIASTOLIC BLOOD PRESSURE: 70 MMHG | SYSTOLIC BLOOD PRESSURE: 120 MMHG | HEIGHT: 70 IN | RESPIRATION RATE: 12 BRPM | BODY MASS INDEX: 25.77 KG/M2 | WEIGHT: 180 LBS | HEART RATE: 82 BPM

## 2024-01-02 PROCEDURE — 99214 OFFICE O/P EST MOD 30 MIN: CPT

## 2024-01-02 NOTE — PHYSICAL EXAM
[FreeTextEntry1] : Gen: Patient is A&O x 3, NAD HEENT: EOMI, hearing grossly normal. Resp: regular, non - labored CV: pulses regular Skin: no rashes, erythema Lymph: no clubbing, cyanosis, edema in UE Inspection: no instability ROM: Restricted in left shoulder abduction Palpation: TTP right first dorsal compartment   Sensation: decreased to light touch in distal fingertips and toes, No ring finger splitting   Reflexes: 1+ and symmetric throughout Strength: 5/5 throughout Special tests: +left Arevalo, -Empty can test, +Right finkelsteins test  Gait: normal, non-antalgic

## 2024-01-02 NOTE — REVIEW OF SYSTEMS
[Joint Pain] : joint pain [Joint Stiffness] : joint stiffness [Negative] : Heme/Lymph [FreeTextEntry9] : +joint pains  [de-identified] : numbness/tinging and burning pain in fingers/toes

## 2024-01-02 NOTE — DATA REVIEWED
[FreeTextEntry1] : MRI Left shoulder November 2023: Partial bursal surface and undersurface tears of supraspinatus.  Edema like signal about trapezoid ligament.  Bones, no fracture.

## 2024-01-02 NOTE — HISTORY OF PRESENT ILLNESS
[FreeTextEntry1] : 53 year old right-handed female with history of left breast invasive ductal carcinoma s/p TC 2/2 12/16/22, treatment regimen switched to ACTHP secondary to decreased renal function. s/p AC 2/2 2/3/23. s/p taxol 11/12. C12 held because of worsening neuropathy. Continues on herceptin/perjeta. Left breast lumpectomy/ SLND on 6/6/23 (2 left LNs) at [facility]. s/p Left breast adjuvant radiation completed on 8/3/23. On anastrozole.    She has numbness/tingling which started while on taxol. It developed into burning pain and finger swelling around 1 month ago. She iced hands/feet and received reflexology during treatment. She has generalized morning stiffness especially in her fingers that improve 1-2 hours after waking up. She had prior work-up by Rheumatology and it was unremarkable. She tried neuropathy oils that she found on [employer] and heat, but it only helps while doing it. Denies any focal weakness.    Interval history: Neuropathy is still better.  Increase in duloxetine to 60mg not helpful.  No edema in arm.  Still with shoulder tightness.  Exacerbation of pain in right first dorsal compartment, no recent trauma.

## 2024-01-02 NOTE — ASSESSMENT
[FreeTextEntry1] : 53-year-old female presenting for evaluation.  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

## 2024-01-03 ENCOUNTER — APPOINTMENT (OUTPATIENT)
Dept: PHYSICAL MEDICINE AND REHAB | Facility: CLINIC | Age: 54
End: 2024-01-03
Payer: COMMERCIAL

## 2024-01-03 VITALS — HEIGHT: 70 IN | WEIGHT: 180 LBS | BODY MASS INDEX: 25.77 KG/M2

## 2024-01-03 LAB
25(OH)D3 SERPL-MCNC: 44.3 NG/ML
ALBUMIN SERPL ELPH-MCNC: 4.7 G/DL
ALP BLD-CCNC: 120 U/L
ALT SERPL-CCNC: 20 U/L
ANION GAP SERPL CALC-SCNC: 15 MMOL/L
AST SERPL-CCNC: 26 U/L
B BURGDOR AB SER-IMP: NEGATIVE
B BURGDOR IGG+IGM SER QL: 0.1 INDEX
BASOPHILS # BLD AUTO: 0.05 K/UL
BASOPHILS NFR BLD AUTO: 0.9 %
BILIRUB SERPL-MCNC: 0.4 MG/DL
BILIRUB UR QL STRIP: NEGATIVE
BUN SERPL-MCNC: 20 MG/DL
CALCIUM SERPL-MCNC: 10.1 MG/DL
CHLORIDE SERPL-SCNC: 100 MMOL/L
CHOLEST SERPL-MCNC: 240 MG/DL
CO2 SERPL-SCNC: 27 MMOL/L
COLLECTION METHOD: NORMAL
CREAT SERPL-MCNC: 1.12 MG/DL
EGFR: 58 ML/MIN/1.73M2
EOSINOPHIL # BLD AUTO: 0.24 K/UL
EOSINOPHIL NFR BLD AUTO: 4.2 %
ESTIMATED AVERAGE GLUCOSE: 111 MG/DL
FERRITIN SERPL-MCNC: 314 NG/ML
FOLATE SERPL-MCNC: >20 NG/ML
GLUCOSE SERPL-MCNC: 108 MG/DL
GLUCOSE UR-MCNC: NEGATIVE
HBA1C MFR BLD HPLC: 5.5 %
HCG UR QL: 0.2 EU/DL
HCT VFR BLD CALC: 36.1 %
HDLC SERPL-MCNC: 50 MG/DL
HGB BLD-MCNC: 11.5 G/DL
HGB UR QL STRIP.AUTO: NORMAL
IMM GRANULOCYTES NFR BLD AUTO: 0.2 %
IRON SATN MFR SERPL: 19 %
IRON SERPL-MCNC: 63 UG/DL
KETONES UR-MCNC: NEGATIVE
LDLC SERPL CALC-MCNC: 154 MG/DL
LEUKOCYTE ESTERASE UR QL STRIP: NORMAL
LYMPHOCYTES # BLD AUTO: 1.1 K/UL
LYMPHOCYTES NFR BLD AUTO: 19.4 %
MAGNESIUM SERPL-MCNC: 1.4 MG/DL
MAN DIFF?: NORMAL
MCHC RBC-ENTMCNC: 30.3 PG
MCHC RBC-ENTMCNC: 31.9 GM/DL
MCV RBC AUTO: 95 FL
MONOCYTES # BLD AUTO: 0.6 K/UL
MONOCYTES NFR BLD AUTO: 10.6 %
NEUTROPHILS # BLD AUTO: 3.67 K/UL
NEUTROPHILS NFR BLD AUTO: 64.7 %
NITRITE UR QL STRIP: NEGATIVE
NONHDLC SERPL-MCNC: 190 MG/DL
PH UR STRIP: 7
PLATELET # BLD AUTO: 276 K/UL
POTASSIUM SERPL-SCNC: 4.7 MMOL/L
PROT SERPL-MCNC: 7.4 G/DL
PROT UR STRIP-MCNC: NEGATIVE
RBC # BLD: 3.8 M/UL
RBC # FLD: 13.9 %
SODIUM SERPL-SCNC: 142 MMOL/L
SP GR UR STRIP: 1.02
T3 SERPL-MCNC: 116 NG/DL
T3RU NFR SERPL: 1.1 TBI
T4 FREE SERPL-MCNC: 1.1 NG/DL
TIBC SERPL-MCNC: 332 UG/DL
TRIGL SERPL-MCNC: 195 MG/DL
TSH SERPL-ACNC: 0.63 UIU/ML
UIBC SERPL-MCNC: 268 UG/DL
VIT B12 SERPL-MCNC: 531 PG/ML
WBC # FLD AUTO: 5.67 K/UL

## 2024-01-03 PROCEDURE — 99214 OFFICE O/P EST MOD 30 MIN: CPT | Mod: 95

## 2024-01-03 PROCEDURE — 99214 OFFICE O/P EST MOD 30 MIN: CPT

## 2024-01-03 RX ORDER — ROSUVASTATIN CALCIUM 10 MG/1
10 TABLET, FILM COATED ORAL DAILY
Qty: 90 | Refills: 1 | Status: ACTIVE | COMMUNITY
Start: 2022-09-04 | End: 1900-01-01

## 2024-01-03 NOTE — PLAN
[] : Right [FreeTextEntry1] : Labs reviewed with patient during this encounter.  magnesium = 1.4 - will increase Mg  Mixed lipids - increase Crestor to 10 mg.  Patient to continue with present medications - all medications reconciled/reviewed during this visit and listed above Patient to start Vitamin therapy as discussed during visit Increase fluid intake..  RTO for repeat labs in 3 months.  RTO in 3 months for re-evaluation.  Diet teaching for at least 8 minutes.performed in depth during this visit related to cholesterol and cardiovascular risks. At least 35 minutes was spent with patient via video conference reviewing findings/results during this visit. Ample time was provided to answer any questions or address concerns to the patients satisfaction..  Patient was advised that this audiovisual encounter constitutes a billable visit, and that the visit will be billed on the same terms and conditions as an in-office, face-to-face visit. Patient was further advised they may be responsible for any co-payment, co-insurance, or other self-payment they would normally pay for an office visit, unless such self-payment was waived by their insurance carrier. 		 At this time the patient is not willing to begin medication therapy for Cholesterol and wishes to modify diet in attempt to correct levels. The risk of elevated lipids were discussed at length during this visit and patient states understanding..

## 2024-01-04 ENCOUNTER — NON-APPOINTMENT (OUTPATIENT)
Age: 54
End: 2024-01-04

## 2024-01-04 ENCOUNTER — RESULT REVIEW (OUTPATIENT)
Age: 54
End: 2024-01-04

## 2024-01-04 ENCOUNTER — APPOINTMENT (OUTPATIENT)
Dept: ENDOVASCULAR SURGERY | Facility: CLINIC | Age: 54
End: 2024-01-04
Payer: COMMERCIAL

## 2024-01-04 VITALS
HEART RATE: 71 BPM | TEMPERATURE: 97.6 F | HEIGHT: 70 IN | OXYGEN SATURATION: 97 % | SYSTOLIC BLOOD PRESSURE: 138 MMHG | BODY MASS INDEX: 25.05 KG/M2 | DIASTOLIC BLOOD PRESSURE: 87 MMHG | WEIGHT: 175 LBS | RESPIRATION RATE: 18 BRPM

## 2024-01-04 RX ORDER — MULTIVIT-MIN/IRON/FOLIC ACID/K 18-600-40
400 CAPSULE ORAL
Refills: 0 | Status: ACTIVE | COMMUNITY

## 2024-01-04 RX ORDER — DULOXETINE HYDROCHLORIDE 20 MG/1
20 CAPSULE, DELAYED RELEASE PELLETS ORAL DAILY
Qty: 60 | Refills: 1 | Status: DISCONTINUED | COMMUNITY
Start: 2024-01-02 | End: 2024-01-04

## 2024-01-04 RX ORDER — CHLORHEXIDINE GLUCONATE 4 %
LIQUID (ML) TOPICAL
Refills: 0 | Status: ACTIVE | COMMUNITY

## 2024-01-10 NOTE — PAST MEDICAL HISTORY
[Increasing age ( >40 years old)] : Increasing age ( >40 years old) [Malignancy] : Malignancy [FreeTextEntry1] : Malignant Hyperthermia Screening Tool and Risk of Bleeding Assessment  Ms. SARAH CEDILLO denies family history of unexpected death following Anesthesia or Exercise. Denies Family history of Malignant Hyperthermia, Muscle or Neuromuscular disorder and High Temperature following exercise.  Ms. SARAH CEDILLO denies history of Muscle Spasm, Dark or Chocolate - Colored urine and Unanticipated fever immediately following anesthesia or serious exercise.  Ms. CEDILLO also denies bleeding tendencies/ Risks of Bleeding.

## 2024-01-10 NOTE — PROCEDURE
[D/C IV on discharge] : D/C IV on discharge [Resume diet] : resume diet [Site check for bleeding/hematoma] : Site check for bleeding/hematoma [Vital signs on admission the q 15 mins x2] : Vital signs on admission the q 15 mins x2 [FreeTextEntry1] : mediport removal

## 2024-01-10 NOTE — HISTORY OF PRESENT ILLNESS
[Home] : at home, [unfilled] , at the time of the visit. [Medical Office: ([facility])___] : at the medical office located in  [Verbal consent obtained from patient] : the patient, [unfilled] [de-identified] : Patient encounter today for re-evaluation of cholesterol.  States she has been doing well.  Denies any CP, SOB or diff breathing.  No recent fever, chills, cough or cold type symptoms.  Has no other complaints at this time.  [FreeTextEntry1] : alert and oriented  accompanied by  Mustapha [phone] took amlodipine and metoprolol this morning  [FreeTextEntry5] : yesterday at 830pm  [FreeTextEntry6] : Dr. Pressley

## 2024-01-26 ENCOUNTER — RESULT REVIEW (OUTPATIENT)
Age: 54
End: 2024-01-26

## 2024-02-06 ENCOUNTER — APPOINTMENT (OUTPATIENT)
Dept: PHYSICAL MEDICINE AND REHAB | Facility: CLINIC | Age: 54
End: 2024-02-06
Payer: COMMERCIAL

## 2024-02-06 VITALS — HEIGHT: 70 IN | BODY MASS INDEX: 25.77 KG/M2 | WEIGHT: 180 LBS

## 2024-02-06 DIAGNOSIS — G89.28 OTHER CHRONIC POSTPROCEDURAL PAIN: ICD-10-CM

## 2024-02-06 PROCEDURE — 93702 BIS XTRACELL FLUID ANALYSIS: CPT

## 2024-02-06 PROCEDURE — 99214 OFFICE O/P EST MOD 30 MIN: CPT | Mod: 25

## 2024-02-06 RX ORDER — DULOXETINE HYDROCHLORIDE 60 MG/1
60 CAPSULE, DELAYED RELEASE PELLETS ORAL
Qty: 30 | Refills: 2 | Status: DISCONTINUED | COMMUNITY
Start: 2023-11-13 | End: 2024-02-06

## 2024-02-06 NOTE — REVIEW OF SYSTEMS
[Joint Pain] : joint pain [Joint Stiffness] : joint stiffness [Negative] : Heme/Lymph [FreeTextEntry9] : +joint pains  [de-identified] : numbness/tinging and burning pain in fingers/toes

## 2024-02-06 NOTE — PHYSICAL EXAM
[FreeTextEntry1] : Gen: Patient is A&O x 3, NAD HEENT: EOMI, hearing grossly normal. Resp: regular, non - labored CV: pulses regular Skin: no rashes, erythema Lymph: no clubbing, cyanosis, edema in UE Inspection: no instability ROM: Near FROM left shoulder abduction  Palpation: No TTP right first dorsal compartment   Sensation: decreased to light touch in distal fingertips and toes, No ring finger splitting   Reflexes: 1+ and symmetric throughout Strength: 5/5 throughout Special tests: -left Arevalo, -Empty can test Gait: normal, non-antalgic   Bioimpedance spectroscopy performed today with L-Dex -1.8  (-3.8 post-operative baseline).

## 2024-02-06 NOTE — HISTORY OF PRESENT ILLNESS
[FreeTextEntry1] : 53 year old right-handed female with history of left breast invasive ductal carcinoma s/p TC 2/2 12/16/22, treatment regimen switched to ACTHP secondary to decreased renal function. s/p AC 2/2 2/3/23. s/p taxol 11/12. C12 held because of worsening neuropathy. Continues on herceptin/perjeta. Left breast lumpectomy/ SLND on 6/6/23 (2 left LNs) at [facility]. s/p Left breast adjuvant radiation completed on 8/3/23. On anastrozole.    She has numbness/tingling which started while on taxol. It developed into burning pain and finger swelling around 1 month ago. She iced hands/feet and received reflexology during treatment. She has generalized morning stiffness especially in her fingers that improve 1-2 hours after waking up. She had prior work-up by Rheumatology and it was unremarkable. She tried neuropathy oils that she found on [employer] and heat, but it only helps while doing it. Denies any focal weakness.    Interval history: She reports overall her joint pains and neuropathy are improving.  On duloxetine 40 mg daily.  No side effects.  Had corticosteroid injection in her wrist thought this was helpful.  Has been doing PT and reports improvement in shoulder pain as well as range of motion.  Denies any swelling or heaviness in her upper extremity.

## 2024-02-06 NOTE — ASSESSMENT
[FreeTextEntry1] : 53-year-old female presenting for evaluation.  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      The patient had a follow-up SOZO measurement which I reviewed today.  Bioimpedance spectroscopy helps identify the onset of lymphedema in an arm or leg before patients experience noticeable swelling. Research has shown that the early detection of lymphedema using L-Dex combined with treatment can reduce progression to chronic lymphedema by 95% in breast cancer patients. Whenever possible, patients are tested for baseline L-Dex score before cancer treatment begins and then are reassessed during regular follow-up visits using the SOZO device. Otherwise, this can be started postoperatively and continued during regular follow-up visits. If the patients L-Dex score increases above normal levels, that is a sign that lymphedema is developing, and a referral is made to physical therapy for further evaluation and/or early compression treatment. Lymphedema assessment with the SOZO L-Dex score is recommended to be done every 3 months for the first 3 years and then every 6 months for years 4 and 5, followed by annually afterwards.

## 2024-02-20 ENCOUNTER — RX RENEWAL (OUTPATIENT)
Age: 54
End: 2024-02-20

## 2024-02-20 RX ORDER — ANASTROZOLE TABLETS 1 MG/1
1 TABLET ORAL DAILY
Qty: 30 | Refills: 5 | Status: ACTIVE | COMMUNITY
Start: 2023-08-04 | End: 1900-01-01

## 2024-03-27 ENCOUNTER — OUTPATIENT (OUTPATIENT)
Dept: OUTPATIENT SERVICES | Facility: HOSPITAL | Age: 54
LOS: 1 days | Discharge: HOME | End: 2024-03-27

## 2024-03-27 DIAGNOSIS — Z98.890 OTHER SPECIFIED POSTPROCEDURAL STATES: Chronic | ICD-10-CM

## 2024-03-27 DIAGNOSIS — C50.919 MALIGNANT NEOPLASM OF UNSPECIFIED SITE OF UNSPECIFIED FEMALE BREAST: ICD-10-CM

## 2024-03-29 ENCOUNTER — NON-APPOINTMENT (OUTPATIENT)
Age: 54
End: 2024-03-29

## 2024-04-05 ENCOUNTER — RESULT REVIEW (OUTPATIENT)
Age: 54
End: 2024-04-05

## 2024-04-05 ENCOUNTER — APPOINTMENT (OUTPATIENT)
Dept: HEMATOLOGY ONCOLOGY | Facility: CLINIC | Age: 54
End: 2024-04-05
Payer: COMMERCIAL

## 2024-04-05 VITALS
RESPIRATION RATE: 16 BRPM | BODY MASS INDEX: 25.83 KG/M2 | OXYGEN SATURATION: 99 % | WEIGHT: 180 LBS | TEMPERATURE: 97.9 F | HEART RATE: 82 BPM | DIASTOLIC BLOOD PRESSURE: 83 MMHG | SYSTOLIC BLOOD PRESSURE: 123 MMHG

## 2024-04-05 DIAGNOSIS — Z79.811 LONG TERM (CURRENT) USE OF AROMATASE INHIBITORS: ICD-10-CM

## 2024-04-05 LAB
BASOPHILS # BLD AUTO: 0.05 K/UL — SIGNIFICANT CHANGE UP (ref 0–0.2)
BASOPHILS NFR BLD AUTO: 0.9 % — SIGNIFICANT CHANGE UP (ref 0–2)
EOSINOPHIL # BLD AUTO: 0.13 K/UL — SIGNIFICANT CHANGE UP (ref 0–0.5)
EOSINOPHIL NFR BLD AUTO: 2.4 % — SIGNIFICANT CHANGE UP (ref 0–6)
HCT VFR BLD CALC: 34.4 % — LOW (ref 34.5–45)
HGB BLD-MCNC: 11.8 G/DL — SIGNIFICANT CHANGE UP (ref 11.5–15.5)
IMM GRANULOCYTES NFR BLD AUTO: 0 % — SIGNIFICANT CHANGE UP (ref 0–0.9)
LYMPHOCYTES # BLD AUTO: 1.85 K/UL — SIGNIFICANT CHANGE UP (ref 1–3.3)
LYMPHOCYTES # BLD AUTO: 34.1 % — SIGNIFICANT CHANGE UP (ref 13–44)
MCHC RBC-ENTMCNC: 30.6 PG — SIGNIFICANT CHANGE UP (ref 27–34)
MCHC RBC-ENTMCNC: 34.3 G/DL — SIGNIFICANT CHANGE UP (ref 32–36)
MCV RBC AUTO: 89.1 FL — SIGNIFICANT CHANGE UP (ref 80–100)
MONOCYTES # BLD AUTO: 0.59 K/UL — SIGNIFICANT CHANGE UP (ref 0–0.9)
MONOCYTES NFR BLD AUTO: 10.9 % — SIGNIFICANT CHANGE UP (ref 2–14)
NEUTROPHILS # BLD AUTO: 2.8 K/UL — SIGNIFICANT CHANGE UP (ref 1.8–7.4)
NEUTROPHILS NFR BLD AUTO: 51.7 % — SIGNIFICANT CHANGE UP (ref 43–77)
NRBC # BLD: 0 /100 WBCS — SIGNIFICANT CHANGE UP (ref 0–0)
PLATELET # BLD AUTO: 243 K/UL — SIGNIFICANT CHANGE UP (ref 150–400)
RBC # BLD: 3.86 M/UL — SIGNIFICANT CHANGE UP (ref 3.8–5.2)
RBC # FLD: 12.9 % — SIGNIFICANT CHANGE UP (ref 10.3–14.5)
WBC # BLD: 5.42 K/UL — SIGNIFICANT CHANGE UP (ref 3.8–10.5)
WBC # FLD AUTO: 5.42 K/UL — SIGNIFICANT CHANGE UP (ref 3.8–10.5)

## 2024-04-05 PROCEDURE — G2211 COMPLEX E/M VISIT ADD ON: CPT

## 2024-04-05 PROCEDURE — 99215 OFFICE O/P EST HI 40 MIN: CPT

## 2024-04-06 LAB
ALBUMIN SERPL ELPH-MCNC: 4.8 G/DL
ALP BLD-CCNC: 107 U/L
ALT SERPL-CCNC: 24 U/L
ANION GAP SERPL CALC-SCNC: 12 MMOL/L
AST SERPL-CCNC: 27 U/L
BILIRUB SERPL-MCNC: 0.2 MG/DL
BUN SERPL-MCNC: 32 MG/DL
CALCIUM SERPL-MCNC: 9.5 MG/DL
CHLORIDE SERPL-SCNC: 101 MMOL/L
CO2 SERPL-SCNC: 26 MMOL/L
CREAT SERPL-MCNC: 1.19 MG/DL
EGFR: 54 ML/MIN/1.73M2
GLUCOSE SERPL-MCNC: 94 MG/DL
POTASSIUM SERPL-SCNC: 4.2 MMOL/L
PROT SERPL-MCNC: 7.6 G/DL
SODIUM SERPL-SCNC: 139 MMOL/L

## 2024-04-06 NOTE — HISTORY OF PRESENT ILLNESS
[Disease: _____________________] : Disease: [unfilled] [T: ___] : T[unfilled] [N: ___] : N[unfilled] [M: ___] : M[unfilled] [AJCC Stage: ____] : AJCC Stage: [unfilled] [de-identified] : Patient initially seen by Dr. Beto Preston and transferring care to me (Dr. Marrero) as of 4/2024 as Dr. Preston has left  [facility] (formerly [facility] History is as per notes from Dr. Preston in the AEHR and confirmed with patient.  Mrs. Mahoney previously underwent cosmetic procedures to remove fat from her axillae but noted persistent residual tissue in the left axilla. She followed with her plastic surgeon.  10/10/2022 - diagnostic mammogram -  in the upper left breast, middle depth, there was an area of subtle architectural distortion; there was also stable benign-appearing mass in the central left breast at the middle to posterior depth; there were bilateral benign-appearing calcifications.  An ultrasound performed on that same date in the left breast demonstrated left breast 1 o'clock axis 8 centimeters from nipple, likely corresponding to the subtle distortion seen mammographically, there was an 8 x 8 x 8 millimeter irregular hypoechoic mass, which appeared to be associated with some subtle sonographic distortion with ultrasound-guided core biopsy recommended; in the left breast 5 o'clock axis 3 centimeters from nipple, where the patient felt a lump, there was a vague 15 x 6 x 10 millimeter irregular hypoechoic mass. There was no suspicious axillary LN.   10/14/2022 -  - U/S guided biopsy of left breast 1 o'clock lesion:  invasive ductal carcinoma, moderately differentiated, ER+ % IA+ % Her2 positive (2+ IHC, FISH amplified), Ki67 30% - U/S guided biopsy of left breast 5 o'clock lesion showing invasive ductal carcinoma, moderately differentiated,  There was a comment that the 2 lesions were 5.4 centimeters apart.  She was evaluated by breast surgery,  Dr. Tierra Samayoa  10/20/2022  Breast MRI  - mass and non-mass enhancement surrounding the biopsy marker in the upper outer breast middle to posterior depth spanning up to 3.5 centimeters x 2.5 centimeters corresponding to the site of newly diagnosed malignancy; there was a mass and non-mass enhancement in the lower outer left breast 5 o'clock position anterior to middle depth, spanning 3.7 x 2.0 centimeters surrounding a biopsy marker; the enhancement was fairly superficial in location, part of which was located in subcutaneous fat without overlying skin enhancement; there was a 6 millimeter focus of irregular, superficial, non-mass enhancement in the lower outer anterior breast located approximately 1.3-1.4 centimeters from nipple within contiguous non-mass enhancement posterior to the index carcinoma at the 5 o'clock position; no axillary right adenopathy was noted; no significant internal mammary adenopathy was noted; there were 2 adjacent prominent level 1 lymph nodes with possible thickened cortex measuring up to 1.5 centimeters in length in the left axilla with no overlying skin thickening or edema in the axilla.    She was seen for initial consultation with Dr. Preston on 11/1/2022 and offered neoadjuvant chemotherapy given the multifocal disease and enhancement of >3cm on MRI  She was treated with 2 cycle of TCHP, tolerated fairly well except that on the date of treatment her creat had increased (prev 0.6 to 1.1 on date if treatment) but with the carbo dose based on pre-treatment creatinine. She had follow up BMPs with creat as high as 1.56  in the intertreatment interval. Pt receive IVF wit gradual improvement but not back to baseline. TCHP cycle 2 held / delayed but creat did not recover quickly. Dr. Preston recommended changing her therapy to ACTHP instead, with 2 additional cycles of AC administered and ultimately 11 out of 12 cycles of taxol administered due worsening neuropathy.   Post treatment MRI breast 4/25/23 showed response;  On 6/5/23 she underwent Left Lumpectomy/SLND with findings: PATHOLOGY 1. Breast, left 1 o'clock, lumpectomy: - No residual carcinoma, status post neoadjuvant therapy - Benign breast tissue with duct ectasia - Biopsy site and tumor bed changes  2. Breast, left 5 o'clock, lumpectomy: - No residual carcinoma, status post neoadjuvant therapy - Benign breast tissue with calcifications - Biopsy site and tumor bed changes  3. Axilla, left, sentinel lymph node: - Two lymph nodes negative for carcinoma (0/2) - No treatment effect identified  s/p Left breast adjuvant radiation 8/3/23 - Dr. Banks in [place] completed Herceptin/Perjeta in 12/2023 Stared anastrozole on 8/26/23.   [de-identified] : ER+RI+HER2+ (2+ by IHC, amplified by FISH) [de-identified] : Ki67 - 30%  Germline genetic testing - 4/2023 - [facility]; pathogenic CHEK2 mutation [100: Normal, no complaints, no evidence of disease.] : 100: Normal, no complaints, no evidence of disease. [de-identified] : 4/5/24 Transferring care from Dr. Preston to me today All of the patient's prior records including radiology, pathology and prior notes reviewed; Past Medical History, Past Surgical History, Family History and Social history reviewed and updated in the patient's chart. Patient returns today to rule out progression or recurrence of breast cancer and to assess treatment toxicity; on anastrozole  She continues to c/o ongoing arthralgia, worse in am then improves as day goes on.  She is followed by pain management and is currently on Cymbalta, which has dramatically helped her neuropathic pain but she still has numbness   Patient denies any SOB, CP, abdominal pain, bone pain, headache, or unexplained weight loss Patient denies any breast masses,  skin changes or nipple discharge. Patient denies any hotflashes, vaginal dryness, vaginal bleeding, hair loss, muscle cramps.  Echo, 10/5/23- Visually estimated LVEF is 55-60%  [ECOG Performance Status: 0 - Fully active, able to carry on all pre-disease performance without restriction] : Performance Status: 0 - Fully active, able to carry on all pre-disease performance without restriction

## 2024-04-06 NOTE — PHYSICAL EXAM
[Fully active, able to carry on all pre-disease performance without restriction] : Status 0 - Fully active, able to carry on all pre-disease performance without restriction [Normal] : affect appropriate [de-identified] : Thyromegaly, L>R w/o change; having routine Thyroid U/S done [de-identified] : Right breast with no skin or nipple changes, no discrete palpable masses, no palpable axillary  nodes.  Left breast with well healed surgical scars, no discrete palpable masses, no palpable axillary nodes.

## 2024-04-06 NOTE — ASSESSMENT
[FreeTextEntry1] : 53 yo woman with multifocal left breast invasive ductal carcinoma ER+CT+Her2+, Stage 1B (T2N0); s/p neoadjuvant TCHP X2 doses complicated by nephropathy, treatment changed to ACX2 followed by taxol/herceptin/perjeta but only received 11/12 doses of taxol due to neuropathy. Underwent Lumpectomies with Dr. Samayoa with no residual tumor found. - completed adjuvant RT in 8/2023 - completed adjuvant herceptin/perjeta in 12/2023  - remains on adjuvant endocrine therapy (anastrozole) since 8/2023 without incident; plan for 7-10 years of therapy - BMD - 12/2023 normal; to repeat in 12/2025 - plan for repeat mammogram in 5/2024 as ordered by Dr. Samayoa and repeat breast MRI in 11/2024 as ordered by Dr. Samayoa - following with Dr. Samayoa (Breast Surgery) annually; next due in 12/2024 - continue follow-up with PT - labs today including chem panel and CBC; advised no role for tumor marker testing - given +CHEK2 mutation, will need colonoscopy every 5 years next due in 2024 - Patient had the opportunity to have all their questions answered to their satisfaction - f/u in 3 months; will slowly transition to 6 month follow-ups

## 2024-05-14 NOTE — END OF VISIT
[FreeTextEntry3] : Patient being seen as per physician's primary plan of care. [Time Spent: ___ minutes] : I have spent [unfilled] minutes of time on the encounter. 15-May-2024

## 2024-05-15 ENCOUNTER — APPOINTMENT (OUTPATIENT)
Dept: PHYSICAL MEDICINE AND REHAB | Facility: CLINIC | Age: 54
End: 2024-05-15
Payer: COMMERCIAL

## 2024-05-15 VITALS
RESPIRATION RATE: 16 BRPM | HEART RATE: 84 BPM | OXYGEN SATURATION: 97 % | HEIGHT: 70 IN | TEMPERATURE: 97.5 F | DIASTOLIC BLOOD PRESSURE: 78 MMHG | SYSTOLIC BLOOD PRESSURE: 122 MMHG | BODY MASS INDEX: 25.77 KG/M2 | WEIGHT: 180 LBS

## 2024-05-15 DIAGNOSIS — Z17.0 MALIGNANT NEOPLASM OF UNSPECIFIED SITE OF LEFT FEMALE BREAST: ICD-10-CM

## 2024-05-15 DIAGNOSIS — D64.9 ANEMIA, UNSPECIFIED: ICD-10-CM

## 2024-05-15 DIAGNOSIS — C50.912 MALIGNANT NEOPLASM OF UNSPECIFIED SITE OF LEFT FEMALE BREAST: ICD-10-CM

## 2024-05-15 PROCEDURE — 36410 VNPNXR 3YR/> PHY/QHP DX/THER: CPT

## 2024-05-15 PROCEDURE — 99213 OFFICE O/P EST LOW 20 MIN: CPT

## 2024-05-17 ENCOUNTER — APPOINTMENT (OUTPATIENT)
Dept: PHYSICAL MEDICINE AND REHAB | Facility: CLINIC | Age: 54
End: 2024-05-17

## 2024-05-21 LAB
25(OH)D3 SERPL-MCNC: 56.5 NG/ML
ALBUMIN SERPL ELPH-MCNC: 4.8 G/DL
ALP BLD-CCNC: 112 U/L
ALT SERPL-CCNC: 20 U/L
ANION GAP SERPL CALC-SCNC: 16 MMOL/L
AST SERPL-CCNC: 29 U/L
BASOPHILS # BLD AUTO: 0.05 K/UL
BASOPHILS NFR BLD AUTO: 0.8 %
BILIRUB SERPL-MCNC: 0.3 MG/DL
BUN SERPL-MCNC: 29 MG/DL
CALCIUM SERPL-MCNC: 10 MG/DL
CHLORIDE SERPL-SCNC: 100 MMOL/L
CHOLEST SERPL-MCNC: 225 MG/DL
CO2 SERPL-SCNC: 23 MMOL/L
CREAT SERPL-MCNC: 1.11 MG/DL
EGFR: 59 ML/MIN/1.73M2
EOSINOPHIL # BLD AUTO: 0.16 K/UL
EOSINOPHIL NFR BLD AUTO: 2.5 %
GLUCOSE SERPL-MCNC: 85 MG/DL
HCT VFR BLD CALC: 33.9 %
HDLC SERPL-MCNC: 63 MG/DL
HGB BLD-MCNC: 11.6 G/DL
IMM GRANULOCYTES NFR BLD AUTO: 0.2 %
LDLC SERPL CALC-MCNC: 134 MG/DL
LYMPHOCYTES # BLD AUTO: 1.9 K/UL
LYMPHOCYTES NFR BLD AUTO: 29.8 %
MAN DIFF?: NORMAL
MCHC RBC-ENTMCNC: 30.5 PG
MCHC RBC-ENTMCNC: 34.2 GM/DL
MCV RBC AUTO: 89.2 FL
MONOCYTES # BLD AUTO: 0.68 K/UL
MONOCYTES NFR BLD AUTO: 10.7 %
NEUTROPHILS # BLD AUTO: 3.57 K/UL
NEUTROPHILS NFR BLD AUTO: 56 %
NONHDLC SERPL-MCNC: 162 MG/DL
PLATELET # BLD AUTO: 255 K/UL
POTASSIUM SERPL-SCNC: 3.7 MMOL/L
PROT SERPL-MCNC: 7.8 G/DL
RBC # BLD: 3.8 M/UL
RBC # FLD: 13.3 %
SODIUM SERPL-SCNC: 140 MMOL/L
TRIGL SERPL-MCNC: 158 MG/DL
WBC # FLD AUTO: 6.37 K/UL

## 2024-05-22 ENCOUNTER — APPOINTMENT (OUTPATIENT)
Dept: PHYSICAL MEDICINE AND REHAB | Facility: CLINIC | Age: 54
End: 2024-05-22
Payer: COMMERCIAL

## 2024-05-22 VITALS
BODY MASS INDEX: 25.77 KG/M2 | WEIGHT: 180 LBS | DIASTOLIC BLOOD PRESSURE: 80 MMHG | RESPIRATION RATE: 14 BRPM | HEART RATE: 86 BPM | HEIGHT: 70 IN | SYSTOLIC BLOOD PRESSURE: 110 MMHG

## 2024-05-22 DIAGNOSIS — Z91.89 OTHER SPECIFIED PERSONAL RISK FACTORS, NOT ELSEWHERE CLASSIFIED: ICD-10-CM

## 2024-05-22 DIAGNOSIS — M25.50 PAIN IN UNSPECIFIED JOINT: ICD-10-CM

## 2024-05-22 DIAGNOSIS — M25.512 PAIN IN LEFT SHOULDER: ICD-10-CM

## 2024-05-22 PROCEDURE — 99214 OFFICE O/P EST MOD 30 MIN: CPT | Mod: 25

## 2024-05-22 PROCEDURE — 93702 BIS XTRACELL FLUID ANALYSIS: CPT

## 2024-05-22 RX ORDER — DULOXETINE HYDROCHLORIDE 20 MG/1
20 CAPSULE, DELAYED RELEASE PELLETS ORAL DAILY
Qty: 60 | Refills: 2 | Status: ACTIVE | COMMUNITY
Start: 2024-02-06 | End: 1900-01-01

## 2024-05-22 NOTE — PHYSICAL EXAM
[FreeTextEntry1] : Gen: Patient is A&O x 3, NAD HEENT: EOMI, hearing grossly normal. Resp: regular, non - labored CV: pulses regular Skin: no rashes, erythema Lymph: no clubbing, cyanosis, edema in UE Inspection: no instability ROM: Near FROM left shoulder abduction  Palpation: No TTP right first dorsal compartment   Sensation: decreased to light touch in b/l toes  Reflexes: 1+ and symmetric throughout Strength: 5/5 throughout Special tests: -left Arevalo, -Empty can test Gait: normal, non-antalgic   Bioimpedance spectroscopy performed today with L-Dex -3.1  (-3.8 post-operative baseline).

## 2024-05-22 NOTE — REVIEW OF SYSTEMS
[Joint Pain] : joint pain [Joint Stiffness] : joint stiffness [Negative] : Heme/Lymph [FreeTextEntry9] : +joint pains  [de-identified] : numbness/tinging and burning pain in fingers/toes

## 2024-05-22 NOTE — HISTORY OF PRESENT ILLNESS
[FreeTextEntry1] : 53 year old right-handed female with history of left breast invasive ductal carcinoma s/p TC 2/2 12/16/22, treatment regimen switched to ACTHP secondary to decreased renal function. s/p AC 2/2 2/3/23. s/p taxol 11/12. C12 held because of worsening neuropathy. Continues on herceptin/perjeta. Left breast lumpectomy/ SLND on 6/6/23 (2 left LNs) at [facility]. s/p Left breast adjuvant radiation completed on 8/3/23. On anastrozole.    She has numbness/tingling which started while on taxol. It developed into burning pain and finger swelling around 1 month ago. She iced hands/feet and received reflexology during treatment. She has generalized morning stiffness especially in her fingers that improve 1-2 hours after waking up. She had prior work-up by Rheumatology and it was unremarkable. She tried neuropathy oils that she found on [employer] and heat, but it only helps while doing it. Denies any focal weakness.    Interval history: She reports joint pains continue to improve.  Still with neuropathy in feet.  On duloxetine, helping, no side effects.  Still with left shoulder pain.  No weakness.  No edema in UE.

## 2024-05-22 NOTE — ASSESSMENT
[FreeTextEntry1] : 53-year-old female presenting for evaluation.  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      The patient had a follow-up SOZO measurement which I reviewed today.  Bioimpedance spectroscopy helps identify the onset of lymphedema in an arm or leg before patients experience noticeable swelling. Research has shown that the early detection of lymphedema using L-Dex combined with treatment can reduce progression to chronic lymphedema by 95% in breast cancer patients. Whenever possible, patients are tested for baseline L-Dex score before cancer treatment begins and then are reassessed during regular follow-up visits using the SOZO device. Otherwise, this can be started postoperatively and continued during regular follow-up visits. If the patients L-Dex score increases above normal levels, that is a sign that lymphedema is developing, and a referral is made to physical therapy for further evaluation and/or early compression treatment. Lymphedema assessment with the SOZO L-Dex score is recommended to be done every 3 months for the first 3 years and then every 6 months for years 4 and 5, followed by annually afterwards.

## 2024-05-23 ENCOUNTER — OUTPATIENT (OUTPATIENT)
Dept: OUTPATIENT SERVICES | Facility: HOSPITAL | Age: 54
LOS: 1 days | End: 2024-05-23
Payer: COMMERCIAL

## 2024-05-23 ENCOUNTER — APPOINTMENT (OUTPATIENT)
Dept: ULTRASOUND IMAGING | Facility: IMAGING CENTER | Age: 54
End: 2024-05-23
Payer: COMMERCIAL

## 2024-05-23 ENCOUNTER — APPOINTMENT (OUTPATIENT)
Dept: MAMMOGRAPHY | Facility: IMAGING CENTER | Age: 54
End: 2024-05-23
Payer: COMMERCIAL

## 2024-05-23 DIAGNOSIS — Z00.8 ENCOUNTER FOR OTHER GENERAL EXAMINATION: ICD-10-CM

## 2024-05-23 PROCEDURE — 77066 DX MAMMO INCL CAD BI: CPT

## 2024-05-23 PROCEDURE — 77066 DX MAMMO INCL CAD BI: CPT | Mod: 26

## 2024-05-23 PROCEDURE — G0279: CPT

## 2024-05-23 PROCEDURE — G0279: CPT | Mod: 26

## 2024-05-23 PROCEDURE — 76641 ULTRASOUND BREAST COMPLETE: CPT | Mod: 26,50

## 2024-05-23 PROCEDURE — 76641 ULTRASOUND BREAST COMPLETE: CPT

## 2024-05-23 NOTE — PLAN
[FreeTextEntry1] : Labs Drawn by Dr. Ceferino Reis due to poor venous access.  Patient required lab testing due to conditions in their past medical history requiring periodic monitoring.  Labs were sent to doo.   Discussed and reviewed current medications as follows;  Anastrozole- h/o breast ca, Amlodipine and Metoprolol- HTN, Rosuvastatin- HLD, Duloxetine- neuropathy Patient to continue with present medications - all medications reconciled/reviewed during this visit and listed above.   Increase fluid intake. RTO in 7-10 days for re-evaluation. At least 20 minutes was spent with patient face to face examining and reviewing findings/results during this visit. Ample time was provided to answer any questions or address concerns to the patients satisfaction.  I, Deja Sanchez, attest that this documentation has been prepared under the direction and in the presence of Provider Ceferino Reis DNP The documentation recorded by the scribe, in my presence, accurately reflects the service I personally performed, and the decisions made by me with my edits as appropriate. Ceferino Reis DNP

## 2024-05-23 NOTE — HISTORY OF PRESENT ILLNESS
[FreeTextEntry1] : Cholesterol  [de-identified] : Patient encounter today for re-evaluation of cholesterol.  States she has been doing well.  Denies any CP, SOB or diff breathing.  No recent fever, chills, cough or cold type symptoms. Pt does report scapula pain. Reports exacerbation with inhalation.  She reports mammogram/sonogram scheduled next week. Has no other complaints at this time.

## 2024-05-28 ENCOUNTER — APPOINTMENT (OUTPATIENT)
Dept: PHYSICAL MEDICINE AND REHAB | Facility: CLINIC | Age: 54
End: 2024-05-28
Payer: COMMERCIAL

## 2024-05-28 VITALS — BODY MASS INDEX: 25.77 KG/M2 | WEIGHT: 180 LBS | HEIGHT: 70 IN

## 2024-05-28 DIAGNOSIS — Z15.02 MALIGNANT NEOPLASM OF UNSPECIFIED SITE OF UNSPECIFIED FEMALE BREAST: ICD-10-CM

## 2024-05-28 DIAGNOSIS — E55.9 VITAMIN D DEFICIENCY, UNSPECIFIED: ICD-10-CM

## 2024-05-28 DIAGNOSIS — C50.919 MALIGNANT NEOPLASM OF UNSPECIFIED SITE OF UNSPECIFIED FEMALE BREAST: ICD-10-CM

## 2024-05-28 DIAGNOSIS — Z15.89 MALIGNANT NEOPLASM OF UNSPECIFIED SITE OF UNSPECIFIED FEMALE BREAST: ICD-10-CM

## 2024-05-28 DIAGNOSIS — T45.1X5A DRUG-INDUCED POLYNEUROPATHY: ICD-10-CM

## 2024-05-28 DIAGNOSIS — I10 ESSENTIAL (PRIMARY) HYPERTENSION: ICD-10-CM

## 2024-05-28 DIAGNOSIS — Z15.09 MALIGNANT NEOPLASM OF UNSPECIFIED SITE OF UNSPECIFIED FEMALE BREAST: ICD-10-CM

## 2024-05-28 DIAGNOSIS — E78.00 PURE HYPERCHOLESTEROLEMIA, UNSPECIFIED: ICD-10-CM

## 2024-05-28 DIAGNOSIS — G62.0 DRUG-INDUCED POLYNEUROPATHY: ICD-10-CM

## 2024-05-28 PROCEDURE — 99213 OFFICE O/P EST LOW 20 MIN: CPT

## 2024-05-28 NOTE — HISTORY OF PRESENT ILLNESS
[Home] : at home, [unfilled] , at the time of the visit. [Medical Office: ([facility])___] : at the medical office located in  [Verbal consent obtained from patient] : the patient, [unfilled] [FreeTextEntry1] : Cholesterol [de-identified] : Patient encounter today for re-evaluation of cholesterol.  States she has been doing well.  Denies any CP, SOB or diff breathing.  No recent fever, chills, cough or cold type symptoms.  Has no other complaints at this time.

## 2024-07-08 ENCOUNTER — OUTPATIENT (OUTPATIENT)
Dept: OUTPATIENT SERVICES | Facility: HOSPITAL | Age: 54
LOS: 1 days | Discharge: HOME | End: 2024-07-08

## 2024-07-08 DIAGNOSIS — Z98.890 OTHER SPECIFIED POSTPROCEDURAL STATES: Chronic | ICD-10-CM

## 2024-07-08 DIAGNOSIS — C50.919 MALIGNANT NEOPLASM OF UNSPECIFIED SITE OF UNSPECIFIED FEMALE BREAST: ICD-10-CM

## 2024-07-10 ENCOUNTER — APPOINTMENT (OUTPATIENT)
Dept: HEMATOLOGY ONCOLOGY | Facility: CLINIC | Age: 54
End: 2024-07-10
Payer: COMMERCIAL

## 2024-07-10 VITALS
BODY MASS INDEX: 26.26 KG/M2 | SYSTOLIC BLOOD PRESSURE: 123 MMHG | RESPIRATION RATE: 16 BRPM | WEIGHT: 183 LBS | TEMPERATURE: 97.2 F | DIASTOLIC BLOOD PRESSURE: 85 MMHG | OXYGEN SATURATION: 99 % | HEART RATE: 68 BPM

## 2024-07-10 DIAGNOSIS — C50.912 MALIGNANT NEOPLASM OF UNSPECIFIED SITE OF LEFT FEMALE BREAST: ICD-10-CM

## 2024-07-10 DIAGNOSIS — Z79.811 LONG TERM (CURRENT) USE OF AROMATASE INHIBITORS: ICD-10-CM

## 2024-07-10 DIAGNOSIS — M25.50 PAIN IN UNSPECIFIED JOINT: ICD-10-CM

## 2024-07-10 DIAGNOSIS — Z17.0 MALIGNANT NEOPLASM OF UNSPECIFIED SITE OF LEFT FEMALE BREAST: ICD-10-CM

## 2024-07-10 PROCEDURE — 99214 OFFICE O/P EST MOD 30 MIN: CPT

## 2024-07-23 ENCOUNTER — NON-APPOINTMENT (OUTPATIENT)
Age: 54
End: 2024-07-23

## 2024-07-24 RX ORDER — EXEMESTANE 25 MG/1
25 TABLET, FILM COATED ORAL DAILY
Qty: 90 | Refills: 1 | Status: ACTIVE | COMMUNITY
Start: 2024-07-24 | End: 1900-01-01

## 2024-08-28 ENCOUNTER — APPOINTMENT (OUTPATIENT)
Dept: PHYSICAL MEDICINE AND REHAB | Facility: CLINIC | Age: 54
End: 2024-08-28

## 2024-09-25 ENCOUNTER — APPOINTMENT (OUTPATIENT)
Dept: PHYSICAL MEDICINE AND REHAB | Facility: CLINIC | Age: 54
End: 2024-09-25
Payer: COMMERCIAL

## 2024-09-25 DIAGNOSIS — T45.1X5A DRUG-INDUCED POLYNEUROPATHY: ICD-10-CM

## 2024-09-25 DIAGNOSIS — Z91.89 OTHER SPECIFIED PERSONAL RISK FACTORS, NOT ELSEWHERE CLASSIFIED: ICD-10-CM

## 2024-09-25 DIAGNOSIS — G89.28 OTHER CHRONIC POSTPROCEDURAL PAIN: ICD-10-CM

## 2024-09-25 DIAGNOSIS — G62.0 DRUG-INDUCED POLYNEUROPATHY: ICD-10-CM

## 2024-09-25 PROCEDURE — 93702 BIS XTRACELL FLUID ANALYSIS: CPT

## 2024-09-25 PROCEDURE — 99214 OFFICE O/P EST MOD 30 MIN: CPT | Mod: 25

## 2024-09-25 NOTE — REVIEW OF SYSTEMS
yes [Joint Pain] : joint pain [Joint Stiffness] : joint stiffness [Negative] : Heme/Lymph [FreeTextEntry9] : +joint pains

## 2024-09-25 NOTE — PHYSICAL EXAM
[FreeTextEntry1] : Gen: Patient is A&O x 3, NAD HEENT: EOMI, hearing grossly normal. Resp: regular, non - labored CV: pulses regular Skin: no rashes, erythema Lymph: no clubbing, cyanosis, edema in UE Inspection: no instability ROM: FROM left shoulder abduction  Palpation: No TTP right first dorsal compartment   Sensation: decreased to light touch in b/l toes  Reflexes: 1+ and symmetric throughout Strength: 5/5 throughout Special tests: -left Arevalo, -Empty can test Gait: normal, non-antalgic   Bioimpedance spectroscopy performed today with L-Dex -2.1 (-3.8 post-operative baseline).

## 2024-09-25 NOTE — HISTORY OF PRESENT ILLNESS
[FreeTextEntry1] : 53 year old right-handed female with history of left breast invasive ductal carcinoma s/p TC 2/2 12/16/22, treatment regimen switched to ACTHP secondary to decreased renal function. s/p AC 2/2 2/3/23. s/p taxol 11/12. C12 held because of worsening neuropathy. Continues on herceptin/perjeta. Left breast lumpectomy/ SLND on 6/6/23 (2 left LNs) at [facility]. s/p Left breast adjuvant radiation completed on 8/3/23. On exemestane.    She has numbness/tingling which started while on taxol. It developed into burning pain and finger swelling around 1 month ago. She iced hands/feet and received reflexology during treatment. She has generalized morning stiffness especially in her fingers that improve 1-2 hours after waking up. She had prior work-up by Rheumatology and it was unremarkable. She tried neuropathy oils that she found on [employer] and heat, but it only helps while doing it. Denies any focal weakness.    Interval history: Overall she notes significant improvement in her joint pains.  Duloxetine still is very helpful.  Neuropathy also improving still only in her feet hands have improved.  Shoulder pain has significantly resolved.  Continues with manual therapy for myofascial pain.  Denies any swelling or heaviness in her upper extremity.

## 2024-09-25 NOTE — ASSESSMENT
[FreeTextEntry1] : 53-year-old female presenting for evaluation.  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      The patient had a follow-up SOZO measurement which I reviewed today.  Bioimpedance spectroscopy helps identify the onset of lymphedema in an arm or leg before patients experience noticeable swelling. Research has shown that the early detection of lymphedema using L-Dex combined with treatment can reduce progression to chronic lymphedema by 95% in breast cancer patients. Whenever possible, patients are tested for baseline L-Dex score before cancer treatment begins and then are reassessed during regular follow-up visits using the SOZO device. Otherwise, this can be started postoperatively and continued during regular follow-up visits. If the patients L-Dex score increases above normal levels, that is a sign that lymphedema is developing, and a referral is made to physical therapy for further evaluation and/or early compression treatment. Lymphedema assessment with the SOZO L-Dex score is recommended to be done every 3 months for the first 3 years and then every 6 months for years 4 and 5, followed by annually afterwards.

## 2024-10-01 ENCOUNTER — OUTPATIENT (OUTPATIENT)
Dept: OUTPATIENT SERVICES | Facility: HOSPITAL | Age: 54
LOS: 1 days | Discharge: HOME | End: 2024-10-01

## 2024-10-01 DIAGNOSIS — Z98.890 OTHER SPECIFIED POSTPROCEDURAL STATES: Chronic | ICD-10-CM

## 2024-10-01 DIAGNOSIS — C50.919 MALIGNANT NEOPLASM OF UNSPECIFIED SITE OF UNSPECIFIED FEMALE BREAST: ICD-10-CM

## 2024-10-21 ENCOUNTER — RX RENEWAL (OUTPATIENT)
Age: 54
End: 2024-10-21

## 2024-10-29 ENCOUNTER — RESULT REVIEW (OUTPATIENT)
Age: 54
End: 2024-10-29

## 2024-10-29 ENCOUNTER — NON-APPOINTMENT (OUTPATIENT)
Age: 54
End: 2024-10-29

## 2024-10-29 ENCOUNTER — APPOINTMENT (OUTPATIENT)
Dept: HEMATOLOGY ONCOLOGY | Facility: CLINIC | Age: 54
End: 2024-10-29
Payer: COMMERCIAL

## 2024-10-29 VITALS
BODY MASS INDEX: 27.68 KG/M2 | SYSTOLIC BLOOD PRESSURE: 153 MMHG | TEMPERATURE: 97.3 F | WEIGHT: 192.88 LBS | HEART RATE: 78 BPM | OXYGEN SATURATION: 98 % | RESPIRATION RATE: 16 BRPM | DIASTOLIC BLOOD PRESSURE: 93 MMHG

## 2024-10-29 DIAGNOSIS — Z15.02 MALIGNANT NEOPLASM OF UNSPECIFIED SITE OF UNSPECIFIED FEMALE BREAST: ICD-10-CM

## 2024-10-29 DIAGNOSIS — Z17.0 MALIGNANT NEOPLASM OF UNSPECIFIED SITE OF LEFT FEMALE BREAST: ICD-10-CM

## 2024-10-29 DIAGNOSIS — Z15.09 MALIGNANT NEOPLASM OF UNSPECIFIED SITE OF UNSPECIFIED FEMALE BREAST: ICD-10-CM

## 2024-10-29 DIAGNOSIS — C50.912 MALIGNANT NEOPLASM OF UNSPECIFIED SITE OF LEFT FEMALE BREAST: ICD-10-CM

## 2024-10-29 DIAGNOSIS — Z15.89 MALIGNANT NEOPLASM OF UNSPECIFIED SITE OF UNSPECIFIED FEMALE BREAST: ICD-10-CM

## 2024-10-29 DIAGNOSIS — C50.919 MALIGNANT NEOPLASM OF UNSPECIFIED SITE OF UNSPECIFIED FEMALE BREAST: ICD-10-CM

## 2024-10-29 DIAGNOSIS — Z79.811 LONG TERM (CURRENT) USE OF AROMATASE INHIBITORS: ICD-10-CM

## 2024-10-29 LAB
BASOPHILS # BLD AUTO: 0.05 K/UL — SIGNIFICANT CHANGE UP (ref 0–0.2)
BASOPHILS NFR BLD AUTO: 0.9 % — SIGNIFICANT CHANGE UP (ref 0–2)
EOSINOPHIL # BLD AUTO: 0.12 K/UL — SIGNIFICANT CHANGE UP (ref 0–0.5)
EOSINOPHIL NFR BLD AUTO: 2.1 % — SIGNIFICANT CHANGE UP (ref 0–6)
HCT VFR BLD CALC: 38.5 % — SIGNIFICANT CHANGE UP (ref 34.5–45)
HGB BLD-MCNC: 12.6 G/DL — SIGNIFICANT CHANGE UP (ref 11.5–15.5)
IMM GRANULOCYTES NFR BLD AUTO: 0.2 % — SIGNIFICANT CHANGE UP (ref 0–0.9)
LYMPHOCYTES # BLD AUTO: 1.48 K/UL — SIGNIFICANT CHANGE UP (ref 1–3.3)
LYMPHOCYTES # BLD AUTO: 25.6 % — SIGNIFICANT CHANGE UP (ref 13–44)
MCHC RBC-ENTMCNC: 29.9 PG — SIGNIFICANT CHANGE UP (ref 27–34)
MCHC RBC-ENTMCNC: 32.7 G/DL — SIGNIFICANT CHANGE UP (ref 32–36)
MCV RBC AUTO: 91.4 FL — SIGNIFICANT CHANGE UP (ref 80–100)
MONOCYTES # BLD AUTO: 0.71 K/UL — SIGNIFICANT CHANGE UP (ref 0–0.9)
MONOCYTES NFR BLD AUTO: 12.3 % — SIGNIFICANT CHANGE UP (ref 2–14)
NEUTROPHILS # BLD AUTO: 3.41 K/UL — SIGNIFICANT CHANGE UP (ref 1.8–7.4)
NEUTROPHILS NFR BLD AUTO: 58.9 % — SIGNIFICANT CHANGE UP (ref 43–77)
NRBC # BLD: 0 /100 WBCS — SIGNIFICANT CHANGE UP (ref 0–0)
NRBC BLD-RTO: 0 /100 WBCS — SIGNIFICANT CHANGE UP (ref 0–0)
PLATELET # BLD AUTO: 223 K/UL — SIGNIFICANT CHANGE UP (ref 150–400)
RBC # BLD: 4.21 M/UL — SIGNIFICANT CHANGE UP (ref 3.8–5.2)
RBC # FLD: 13.6 % — SIGNIFICANT CHANGE UP (ref 10.3–14.5)
WBC # BLD: 5.78 K/UL — SIGNIFICANT CHANGE UP (ref 3.8–10.5)
WBC # FLD AUTO: 5.78 K/UL — SIGNIFICANT CHANGE UP (ref 3.8–10.5)

## 2024-10-29 PROCEDURE — G2211 COMPLEX E/M VISIT ADD ON: CPT | Mod: NC

## 2024-10-29 PROCEDURE — 99214 OFFICE O/P EST MOD 30 MIN: CPT

## 2024-10-30 LAB
ALBUMIN SERPL ELPH-MCNC: 4.8 G/DL
ALP BLD-CCNC: 111 U/L
ALT SERPL-CCNC: 19 U/L
ANION GAP SERPL CALC-SCNC: 14 MMOL/L
AST SERPL-CCNC: 27 U/L
BILIRUB SERPL-MCNC: 0.2 MG/DL
BUN SERPL-MCNC: 28 MG/DL
CALCIUM SERPL-MCNC: 9.6 MG/DL
CHLORIDE SERPL-SCNC: 97 MMOL/L
CO2 SERPL-SCNC: 26 MMOL/L
CREAT SERPL-MCNC: 1.33 MG/DL
EGFR: 47 ML/MIN/1.73M2
GLUCOSE SERPL-MCNC: 108 MG/DL
POTASSIUM SERPL-SCNC: 4.4 MMOL/L
PROT SERPL-MCNC: 7.6 G/DL
SODIUM SERPL-SCNC: 138 MMOL/L

## 2024-11-11 ENCOUNTER — APPOINTMENT (OUTPATIENT)
Dept: MRI IMAGING | Facility: CLINIC | Age: 54
End: 2024-11-11
Payer: COMMERCIAL

## 2024-11-11 ENCOUNTER — OUTPATIENT (OUTPATIENT)
Dept: OUTPATIENT SERVICES | Facility: HOSPITAL | Age: 54
LOS: 1 days | End: 2024-11-11
Payer: COMMERCIAL

## 2024-11-11 DIAGNOSIS — Z98.890 OTHER SPECIFIED POSTPROCEDURAL STATES: Chronic | ICD-10-CM

## 2024-11-11 DIAGNOSIS — Z00.8 ENCOUNTER FOR OTHER GENERAL EXAMINATION: ICD-10-CM

## 2024-11-11 PROCEDURE — C8937: CPT

## 2024-11-11 PROCEDURE — 77049 MRI BREAST C-+ W/CAD BI: CPT | Mod: 26

## 2024-11-11 PROCEDURE — A9585: CPT

## 2024-11-11 PROCEDURE — C8908: CPT

## 2024-12-06 ENCOUNTER — APPOINTMENT (OUTPATIENT)
Dept: NEPHROLOGY | Facility: CLINIC | Age: 54
End: 2024-12-06
Payer: COMMERCIAL

## 2024-12-06 VITALS
DIASTOLIC BLOOD PRESSURE: 94 MMHG | SYSTOLIC BLOOD PRESSURE: 128 MMHG | OXYGEN SATURATION: 97 % | HEART RATE: 70 BPM | BODY MASS INDEX: 28.55 KG/M2 | WEIGHT: 199 LBS

## 2024-12-06 PROCEDURE — 99213 OFFICE O/P EST LOW 20 MIN: CPT

## 2024-12-07 LAB
ALBUMIN SERPL ELPH-MCNC: 4.8 G/DL
ALP BLD-CCNC: 118 U/L
ALT SERPL-CCNC: 27 U/L
ANION GAP SERPL CALC-SCNC: 15 MMOL/L
APPEARANCE: CLEAR
AST SERPL-CCNC: 42 U/L
BACTERIA: NEGATIVE /HPF
BILIRUB SERPL-MCNC: 0.5 MG/DL
BILIRUBIN URINE: NEGATIVE
BLOOD URINE: NEGATIVE
BUN SERPL-MCNC: 19 MG/DL
CALCIUM SERPL-MCNC: 10.1 MG/DL
CAST: 0 /LPF
CHLORIDE SERPL-SCNC: 98 MMOL/L
CO2 SERPL-SCNC: 26 MMOL/L
COLOR: YELLOW
CREAT SERPL-MCNC: 1.14 MG/DL
CREAT SPEC-SCNC: 10 MG/DL
EGFR: 57 ML/MIN/1.73M2
EPITHELIAL CELLS: 6 /HPF
GLUCOSE QUALITATIVE U: NEGATIVE MG/DL
GLUCOSE SERPL-MCNC: 104 MG/DL
KETONES URINE: NEGATIVE MG/DL
LEUKOCYTE ESTERASE URINE: NEGATIVE
MICROALBUMIN 24H UR DL<=1MG/L-MCNC: <1.2 MG/DL
MICROALBUMIN/CREAT 24H UR-RTO: NORMAL MG/G
MICROSCOPIC-UA: NORMAL
NITRITE URINE: NEGATIVE
PH URINE: 7
POTASSIUM SERPL-SCNC: 4.6 MMOL/L
PROT SERPL-MCNC: 7.9 G/DL
PROTEIN URINE: NEGATIVE MG/DL
RED BLOOD CELLS URINE: 0 /HPF
SODIUM SERPL-SCNC: 139 MMOL/L
SPECIFIC GRAVITY URINE: 1
UROBILINOGEN URINE: 0.2 MG/DL
WHITE BLOOD CELLS URINE: 2 /HPF

## 2024-12-30 ENCOUNTER — NON-APPOINTMENT (OUTPATIENT)
Age: 54
End: 2024-12-30

## 2024-12-30 ENCOUNTER — APPOINTMENT (OUTPATIENT)
Dept: PHYSICAL MEDICINE AND REHAB | Facility: CLINIC | Age: 54
End: 2024-12-30
Payer: COMMERCIAL

## 2024-12-30 ENCOUNTER — LABORATORY RESULT (OUTPATIENT)
Age: 54
End: 2024-12-30

## 2024-12-30 VITALS
TEMPERATURE: 97.6 F | HEIGHT: 70 IN | HEART RATE: 84 BPM | DIASTOLIC BLOOD PRESSURE: 76 MMHG | WEIGHT: 196 LBS | BODY MASS INDEX: 28.06 KG/M2 | OXYGEN SATURATION: 99 % | SYSTOLIC BLOOD PRESSURE: 112 MMHG | RESPIRATION RATE: 16 BRPM

## 2024-12-30 DIAGNOSIS — T45.1X5A DRUG-INDUCED POLYNEUROPATHY: ICD-10-CM

## 2024-12-30 DIAGNOSIS — Z15.09 MALIGNANT NEOPLASM OF UNSPECIFIED SITE OF UNSPECIFIED FEMALE BREAST: ICD-10-CM

## 2024-12-30 DIAGNOSIS — G62.0 DRUG-INDUCED POLYNEUROPATHY: ICD-10-CM

## 2024-12-30 DIAGNOSIS — C50.912 MALIGNANT NEOPLASM OF UNSPECIFIED SITE OF LEFT FEMALE BREAST: ICD-10-CM

## 2024-12-30 DIAGNOSIS — Z15.02 MALIGNANT NEOPLASM OF UNSPECIFIED SITE OF UNSPECIFIED FEMALE BREAST: ICD-10-CM

## 2024-12-30 DIAGNOSIS — Z17.0 MALIGNANT NEOPLASM OF UNSPECIFIED SITE OF LEFT FEMALE BREAST: ICD-10-CM

## 2024-12-30 DIAGNOSIS — Z15.89 MALIGNANT NEOPLASM OF UNSPECIFIED SITE OF UNSPECIFIED FEMALE BREAST: ICD-10-CM

## 2024-12-30 DIAGNOSIS — Z00.00 ENCOUNTER FOR GENERAL ADULT MEDICAL EXAMINATION W/OUT ABNORMAL FINDINGS: ICD-10-CM

## 2024-12-30 DIAGNOSIS — C50.919 MALIGNANT NEOPLASM OF UNSPECIFIED SITE OF UNSPECIFIED FEMALE BREAST: ICD-10-CM

## 2024-12-30 LAB
BILIRUB UR QL STRIP: NORMAL
COLLECTION METHOD: NORMAL
GLUCOSE UR-MCNC: NEGATIVE
HCG UR QL: 0.2 EU/DL
HGB UR QL STRIP.AUTO: NORMAL
KETONES UR-MCNC: NORMAL
LEUKOCYTE ESTERASE UR QL STRIP: NORMAL
NITRITE UR QL STRIP: NEGATIVE
PH UR STRIP: 6
PROT UR STRIP-MCNC: NORMAL
SP GR UR STRIP: 1.02

## 2024-12-30 PROCEDURE — 93000 ELECTROCARDIOGRAM COMPLETE: CPT | Mod: 59

## 2024-12-30 PROCEDURE — 36410 VNPNXR 3YR/> PHY/QHP DX/THER: CPT

## 2024-12-30 PROCEDURE — 81003 URINALYSIS AUTO W/O SCOPE: CPT | Mod: QW

## 2024-12-30 PROCEDURE — 99396 PREV VISIT EST AGE 40-64: CPT

## 2024-12-30 PROCEDURE — G0444 DEPRESSION SCREEN ANNUAL: CPT | Mod: 33,59

## 2024-12-31 LAB
25(OH)D3 SERPL-MCNC: 38.2 NG/ML
ALBUMIN SERPL ELPH-MCNC: 4.4 G/DL
ALP BLD-CCNC: 110 U/L
ALT SERPL-CCNC: 20 U/L
ANION GAP SERPL CALC-SCNC: 14 MMOL/L
AST SERPL-CCNC: 27 U/L
B BURGDOR AB SER-IMP: NEGATIVE
B BURGDOR IGG+IGM SER QL: 0.13 INDEX
BASOPHILS # BLD AUTO: 0.06 K/UL
BASOPHILS NFR BLD AUTO: 1 %
BILIRUB SERPL-MCNC: 0.3 MG/DL
BUN SERPL-MCNC: 19 MG/DL
CALCIUM SERPL-MCNC: 9.5 MG/DL
CHLORIDE SERPL-SCNC: 100 MMOL/L
CHOLEST SERPL-MCNC: 220 MG/DL
CO2 SERPL-SCNC: 23 MMOL/L
CREAT SERPL-MCNC: 1.22 MG/DL
EGFR: 52 ML/MIN/1.73M2
EOSINOPHIL # BLD AUTO: 0.11 K/UL
EOSINOPHIL NFR BLD AUTO: 1.9 %
ESTIMATED AVERAGE GLUCOSE: 126 MG/DL
FERRITIN SERPL-MCNC: 102 NG/ML
FOLATE SERPL-MCNC: >20 NG/ML
GLUCOSE SERPL-MCNC: 109 MG/DL
HBA1C MFR BLD HPLC: 6 %
HCT VFR BLD CALC: 41.7 %
HDLC SERPL-MCNC: 48 MG/DL
HGB BLD-MCNC: 13.3 G/DL
IMM GRANULOCYTES NFR BLD AUTO: 0.2 %
IRON SATN MFR SERPL: 21 %
IRON SERPL-MCNC: 77 UG/DL
LDLC SERPL CALC-MCNC: 141 MG/DL
LYMPHOCYTES # BLD AUTO: 1.62 K/UL
LYMPHOCYTES NFR BLD AUTO: 28.3 %
MAGNESIUM SERPL-MCNC: 1.8 MG/DL
MAN DIFF?: NORMAL
MCHC RBC-ENTMCNC: 29.6 PG
MCHC RBC-ENTMCNC: 31.9 G/DL
MCV RBC AUTO: 92.9 FL
MONOCYTES # BLD AUTO: 0.56 K/UL
MONOCYTES NFR BLD AUTO: 9.8 %
NEUTROPHILS # BLD AUTO: 3.37 K/UL
NEUTROPHILS NFR BLD AUTO: 58.8 %
NONHDLC SERPL-MCNC: 172 MG/DL
PLATELET # BLD AUTO: 294 K/UL
POTASSIUM SERPL-SCNC: 4.2 MMOL/L
PROT SERPL-MCNC: 7.5 G/DL
RBC # BLD: 4.49 M/UL
RBC # FLD: 13.7 %
SODIUM SERPL-SCNC: 137 MMOL/L
T3 SERPL-MCNC: 137 NG/DL
T3RU NFR SERPL: 1 TBI
T4 FREE SERPL-MCNC: 1.1 NG/DL
TIBC SERPL-MCNC: 371 UG/DL
TRIGL SERPL-MCNC: 171 MG/DL
TSH SERPL-ACNC: 0.81 UIU/ML
UIBC SERPL-MCNC: 294 UG/DL
VIT B12 SERPL-MCNC: 463 PG/ML
WBC # FLD AUTO: 5.73 K/UL

## 2025-01-02 ENCOUNTER — APPOINTMENT (OUTPATIENT)
Dept: NEPHROLOGY | Facility: CLINIC | Age: 55
End: 2025-01-02

## 2025-01-09 ENCOUNTER — APPOINTMENT (OUTPATIENT)
Dept: PHYSICAL MEDICINE AND REHAB | Facility: CLINIC | Age: 55
End: 2025-01-09
Payer: COMMERCIAL

## 2025-01-09 VITALS — BODY MASS INDEX: 28.06 KG/M2 | HEIGHT: 70 IN | WEIGHT: 196 LBS

## 2025-01-09 DIAGNOSIS — E55.9 VITAMIN D DEFICIENCY, UNSPECIFIED: ICD-10-CM

## 2025-01-09 DIAGNOSIS — I10 ESSENTIAL (PRIMARY) HYPERTENSION: ICD-10-CM

## 2025-01-09 DIAGNOSIS — R73.09 OTHER ABNORMAL GLUCOSE: ICD-10-CM

## 2025-01-09 DIAGNOSIS — E78.00 PURE HYPERCHOLESTEROLEMIA, UNSPECIFIED: ICD-10-CM

## 2025-01-09 PROCEDURE — 99213 OFFICE O/P EST LOW 20 MIN: CPT

## 2025-01-22 ENCOUNTER — APPOINTMENT (OUTPATIENT)
Facility: CLINIC | Age: 55
End: 2025-01-22
Payer: COMMERCIAL

## 2025-01-22 VITALS — WEIGHT: 195 LBS | BODY MASS INDEX: 27.92 KG/M2 | HEIGHT: 70 IN

## 2025-01-22 DIAGNOSIS — G89.28 OTHER CHRONIC POSTPROCEDURAL PAIN: ICD-10-CM

## 2025-01-22 DIAGNOSIS — Z91.89 OTHER SPECIFIED PERSONAL RISK FACTORS, NOT ELSEWHERE CLASSIFIED: ICD-10-CM

## 2025-01-22 DIAGNOSIS — M79.2 NEURALGIA AND NEURITIS, UNSPECIFIED: ICD-10-CM

## 2025-01-22 PROCEDURE — 93702 BIS XTRACELL FLUID ANALYSIS: CPT

## 2025-01-22 PROCEDURE — 99214 OFFICE O/P EST MOD 30 MIN: CPT | Mod: 25

## 2025-02-12 ENCOUNTER — APPOINTMENT (OUTPATIENT)
Dept: SURGERY | Facility: CLINIC | Age: 55
End: 2025-02-12
Payer: COMMERCIAL

## 2025-02-12 PROCEDURE — 99213K: CUSTOM

## 2025-02-17 ENCOUNTER — APPOINTMENT (OUTPATIENT)
Dept: PHYSICAL MEDICINE AND REHAB | Facility: CLINIC | Age: 55
End: 2025-02-17
Payer: COMMERCIAL

## 2025-02-17 VITALS
HEIGHT: 70 IN | WEIGHT: 195 LBS | RESPIRATION RATE: 16 BRPM | BODY MASS INDEX: 27.92 KG/M2 | HEART RATE: 80 BPM | SYSTOLIC BLOOD PRESSURE: 112 MMHG | DIASTOLIC BLOOD PRESSURE: 76 MMHG | TEMPERATURE: 98.1 F | OXYGEN SATURATION: 99 %

## 2025-02-17 DIAGNOSIS — H60.90 UNSPECIFIED OTITIS EXTERNA, UNSPECIFIED EAR: ICD-10-CM

## 2025-02-17 DIAGNOSIS — E55.9 VITAMIN D DEFICIENCY, UNSPECIFIED: ICD-10-CM

## 2025-02-17 DIAGNOSIS — E78.00 PURE HYPERCHOLESTEROLEMIA, UNSPECIFIED: ICD-10-CM

## 2025-02-17 DIAGNOSIS — I10 ESSENTIAL (PRIMARY) HYPERTENSION: ICD-10-CM

## 2025-02-17 PROCEDURE — 99213 OFFICE O/P EST LOW 20 MIN: CPT

## 2025-02-17 RX ORDER — CIPROFLOXACIN AND DEXAMETHASONE 3; 1 MG/ML; MG/ML
0.3-0.1 SUSPENSION/ DROPS AURICULAR (OTIC) TWICE DAILY
Qty: 1 | Refills: 0 | Status: ACTIVE | COMMUNITY
Start: 2025-02-17 | End: 1900-01-01

## 2025-02-20 ENCOUNTER — APPOINTMENT (OUTPATIENT)
Dept: NEPHROLOGY | Facility: CLINIC | Age: 55
End: 2025-02-20

## 2025-02-25 ENCOUNTER — OUTPATIENT (OUTPATIENT)
Dept: OUTPATIENT SERVICES | Facility: HOSPITAL | Age: 55
LOS: 1 days | Discharge: HOME | End: 2025-02-25

## 2025-02-25 DIAGNOSIS — Z98.890 OTHER SPECIFIED POSTPROCEDURAL STATES: Chronic | ICD-10-CM

## 2025-02-26 ENCOUNTER — NON-APPOINTMENT (OUTPATIENT)
Age: 55
End: 2025-02-26

## 2025-02-26 ENCOUNTER — APPOINTMENT (OUTPATIENT)
Dept: HEMATOLOGY ONCOLOGY | Facility: CLINIC | Age: 55
End: 2025-02-26
Payer: COMMERCIAL

## 2025-02-26 VITALS
WEIGHT: 188.47 LBS | SYSTOLIC BLOOD PRESSURE: 123 MMHG | DIASTOLIC BLOOD PRESSURE: 83 MMHG | HEART RATE: 79 BPM | RESPIRATION RATE: 16 BRPM | TEMPERATURE: 97.9 F | OXYGEN SATURATION: 98 % | BODY MASS INDEX: 27.05 KG/M2

## 2025-02-26 DIAGNOSIS — C50.912 MALIGNANT NEOPLASM OF UNSPECIFIED SITE OF LEFT FEMALE BREAST: ICD-10-CM

## 2025-02-26 DIAGNOSIS — Z17.0 MALIGNANT NEOPLASM OF UNSPECIFIED SITE OF LEFT FEMALE BREAST: ICD-10-CM

## 2025-02-26 DIAGNOSIS — R23.2 FLUSHING: ICD-10-CM

## 2025-02-26 DIAGNOSIS — Z79.811 LONG TERM (CURRENT) USE OF AROMATASE INHIBITORS: ICD-10-CM

## 2025-02-26 PROCEDURE — 99214 OFFICE O/P EST MOD 30 MIN: CPT

## 2025-02-27 PROBLEM — R23.2 HOT FLASHES: Status: ACTIVE | Noted: 2025-02-27

## 2025-03-24 ENCOUNTER — APPOINTMENT (OUTPATIENT)
Dept: PHYSICAL MEDICINE AND REHAB | Facility: CLINIC | Age: 55
End: 2025-03-24

## 2025-04-21 ENCOUNTER — APPOINTMENT (OUTPATIENT)
Dept: PHYSICAL MEDICINE AND REHAB | Facility: CLINIC | Age: 55
End: 2025-04-21

## 2025-04-23 ENCOUNTER — APPOINTMENT (OUTPATIENT)
Facility: CLINIC | Age: 55
End: 2025-04-23
Payer: COMMERCIAL

## 2025-04-23 VITALS — HEIGHT: 70 IN | WEIGHT: 180 LBS | BODY MASS INDEX: 25.77 KG/M2

## 2025-04-23 DIAGNOSIS — M79.2 NEURALGIA AND NEURITIS, UNSPECIFIED: ICD-10-CM

## 2025-04-23 DIAGNOSIS — Z91.89 OTHER SPECIFIED PERSONAL RISK FACTORS, NOT ELSEWHERE CLASSIFIED: ICD-10-CM

## 2025-04-23 DIAGNOSIS — Z79.811 LONG TERM (CURRENT) USE OF AROMATASE INHIBITORS: ICD-10-CM

## 2025-04-23 PROCEDURE — 93702 BIS XTRACELL FLUID ANALYSIS: CPT

## 2025-04-23 PROCEDURE — 99214 OFFICE O/P EST MOD 30 MIN: CPT | Mod: 25

## 2025-05-08 ENCOUNTER — TRANSCRIPTION ENCOUNTER (OUTPATIENT)
Age: 55
End: 2025-05-08

## 2025-05-23 ENCOUNTER — OUTPATIENT (OUTPATIENT)
Dept: OUTPATIENT SERVICES | Facility: HOSPITAL | Age: 55
LOS: 1 days | End: 2025-05-23
Payer: COMMERCIAL

## 2025-05-23 ENCOUNTER — APPOINTMENT (OUTPATIENT)
Dept: ULTRASOUND IMAGING | Facility: CLINIC | Age: 55
End: 2025-05-23
Payer: COMMERCIAL

## 2025-05-23 ENCOUNTER — APPOINTMENT (OUTPATIENT)
Dept: MAMMOGRAPHY | Facility: CLINIC | Age: 55
End: 2025-05-23
Payer: COMMERCIAL

## 2025-05-23 DIAGNOSIS — Z00.8 ENCOUNTER FOR OTHER GENERAL EXAMINATION: ICD-10-CM

## 2025-05-23 DIAGNOSIS — Z98.890 OTHER SPECIFIED POSTPROCEDURAL STATES: Chronic | ICD-10-CM

## 2025-05-23 PROCEDURE — 77066 DX MAMMO INCL CAD BI: CPT | Mod: 26

## 2025-05-23 PROCEDURE — G0279: CPT

## 2025-05-23 PROCEDURE — 76641 ULTRASOUND BREAST COMPLETE: CPT | Mod: 26,50

## 2025-05-23 PROCEDURE — G0279: CPT | Mod: 26

## 2025-05-23 PROCEDURE — 77066 DX MAMMO INCL CAD BI: CPT

## 2025-05-23 PROCEDURE — 76641 ULTRASOUND BREAST COMPLETE: CPT

## 2025-07-14 ENCOUNTER — RX RENEWAL (OUTPATIENT)
Age: 55
End: 2025-07-14

## 2025-07-23 ENCOUNTER — APPOINTMENT (OUTPATIENT)
Facility: CLINIC | Age: 55
End: 2025-07-23
Payer: COMMERCIAL

## 2025-07-23 VITALS — BODY MASS INDEX: 25.77 KG/M2 | HEIGHT: 70 IN | WEIGHT: 180 LBS

## 2025-07-23 DIAGNOSIS — M79.2 NEURALGIA AND NEURITIS, UNSPECIFIED: ICD-10-CM

## 2025-07-23 DIAGNOSIS — G89.28 OTHER CHRONIC POSTPROCEDURAL PAIN: ICD-10-CM

## 2025-07-23 DIAGNOSIS — Z91.89 OTHER SPECIFIED PERSONAL RISK FACTORS, NOT ELSEWHERE CLASSIFIED: ICD-10-CM

## 2025-07-23 PROCEDURE — 99214 OFFICE O/P EST MOD 30 MIN: CPT | Mod: 25

## 2025-07-23 PROCEDURE — 93702 BIS XTRACELL FLUID ANALYSIS: CPT

## 2025-08-12 ENCOUNTER — APPOINTMENT (OUTPATIENT)
Dept: HEMATOLOGY ONCOLOGY | Facility: CLINIC | Age: 55
End: 2025-08-12
Payer: COMMERCIAL

## 2025-08-12 VITALS
HEIGHT: 69.69 IN | SYSTOLIC BLOOD PRESSURE: 130 MMHG | RESPIRATION RATE: 16 BRPM | DIASTOLIC BLOOD PRESSURE: 82 MMHG | WEIGHT: 188.05 LBS | OXYGEN SATURATION: 97 % | HEART RATE: 58 BPM | TEMPERATURE: 97.1 F | BODY MASS INDEX: 27.23 KG/M2

## 2025-08-12 DIAGNOSIS — C50.912 MALIGNANT NEOPLASM OF UNSPECIFIED SITE OF LEFT FEMALE BREAST: ICD-10-CM

## 2025-08-12 DIAGNOSIS — Z17.0 MALIGNANT NEOPLASM OF UNSPECIFIED SITE OF LEFT FEMALE BREAST: ICD-10-CM

## 2025-08-12 DIAGNOSIS — M25.50 PAIN IN UNSPECIFIED JOINT: ICD-10-CM

## 2025-08-12 DIAGNOSIS — Z79.811 LONG TERM (CURRENT) USE OF AROMATASE INHIBITORS: ICD-10-CM

## 2025-08-12 PROCEDURE — 99214 OFFICE O/P EST MOD 30 MIN: CPT

## 2025-08-12 PROCEDURE — G2211 COMPLEX E/M VISIT ADD ON: CPT | Mod: NC

## 2025-08-13 ENCOUNTER — APPOINTMENT (OUTPATIENT)
Dept: PHYSICAL MEDICINE AND REHAB | Facility: CLINIC | Age: 55
End: 2025-08-13
Payer: COMMERCIAL

## 2025-08-13 VITALS
TEMPERATURE: 98.3 F | OXYGEN SATURATION: 99 % | HEART RATE: 87 BPM | HEIGHT: 69 IN | BODY MASS INDEX: 27.85 KG/M2 | RESPIRATION RATE: 16 BRPM | DIASTOLIC BLOOD PRESSURE: 76 MMHG | WEIGHT: 188 LBS | SYSTOLIC BLOOD PRESSURE: 120 MMHG

## 2025-08-13 DIAGNOSIS — Q18.1 PREAURICULAR SINUS AND CYST: ICD-10-CM

## 2025-08-13 PROCEDURE — 99213 OFFICE O/P EST LOW 20 MIN: CPT

## 2025-08-13 RX ORDER — AZITHROMYCIN 250 MG/1
250 TABLET, FILM COATED ORAL
Qty: 1 | Refills: 0 | Status: ACTIVE | COMMUNITY
Start: 2025-08-13 | End: 1900-01-01

## 2025-08-13 RX ORDER — ROSUVASTATIN CALCIUM 20 MG/1
20 TABLET, FILM COATED ORAL
Refills: 0 | Status: ACTIVE | COMMUNITY

## (undated) DEVICE — ELCTR GROUNDING PAD ADULT COVIDIEN

## (undated) DEVICE — DRSG COMBINE 5X9"

## (undated) DEVICE — POSITIONER STRAP ARMBOARD VELCRO TS-30

## (undated) DEVICE — SPECIMEN CONTAINER 100ML

## (undated) DEVICE — GLV 6 PROTEXIS (WHITE)

## (undated) DEVICE — PACK BREAST MINOR

## (undated) DEVICE — ELCTR BOVIE PENCIL SMOKE EVACUATION

## (undated) DEVICE — SUT VICRYL 3-0 27" SH UNDYED

## (undated) DEVICE — SUT MONOCRYL 4-0 27" PS-2 UNDYED

## (undated) DEVICE — PROBE LOCALIZER W/ DRAPES